# Patient Record
Sex: MALE | Race: WHITE | Employment: OTHER | URBAN - METROPOLITAN AREA
[De-identification: names, ages, dates, MRNs, and addresses within clinical notes are randomized per-mention and may not be internally consistent; named-entity substitution may affect disease eponyms.]

---

## 2017-02-14 RX ORDER — ATORVASTATIN CALCIUM 20 MG/1
TABLET, FILM COATED ORAL
Qty: 30 TABLET | Refills: 5 | Status: SHIPPED | OUTPATIENT
Start: 2017-02-14 | End: 2017-08-05 | Stop reason: SDUPTHER

## 2017-03-06 ENCOUNTER — TELEPHONE (OUTPATIENT)
Dept: FAMILY MEDICINE CLINIC | Age: 77
End: 2017-03-06

## 2017-03-07 ENCOUNTER — OFFICE VISIT (OUTPATIENT)
Dept: INTERNAL MEDICINE CLINIC | Age: 77
End: 2017-03-07

## 2017-03-07 VITALS
BODY MASS INDEX: 33.5 KG/M2 | DIASTOLIC BLOOD PRESSURE: 70 MMHG | WEIGHT: 234 LBS | SYSTOLIC BLOOD PRESSURE: 130 MMHG | HEIGHT: 70 IN | HEART RATE: 68 BPM

## 2017-03-07 DIAGNOSIS — I10 ESSENTIAL HYPERTENSION: Primary | ICD-10-CM

## 2017-03-07 DIAGNOSIS — E03.4 HYPOTHYROIDISM DUE TO ACQUIRED ATROPHY OF THYROID: ICD-10-CM

## 2017-03-07 DIAGNOSIS — E78.2 MIXED HYPERLIPIDEMIA: ICD-10-CM

## 2017-03-07 DIAGNOSIS — E55.9 VITAMIN D DEFICIENCY: ICD-10-CM

## 2017-03-07 DIAGNOSIS — D12.6 ADENOMATOUS COLON POLYP: ICD-10-CM

## 2017-03-07 DIAGNOSIS — R00.2 PALPITATIONS: ICD-10-CM

## 2017-03-07 DIAGNOSIS — N40.0 PROSTATE HYPERTROPHY: ICD-10-CM

## 2017-03-07 DIAGNOSIS — R73.02 GLUCOSE INTOLERANCE (IMPAIRED GLUCOSE TOLERANCE): ICD-10-CM

## 2017-03-07 LAB
BASOPHILS ABSOLUTE: 0.1 K/UL (ref 0–0.2)
BASOPHILS RELATIVE PERCENT: 0.7 %
BILIRUBIN URINE: NEGATIVE
BLOOD, URINE: NEGATIVE
CLARITY: CLEAR
COLOR: YELLOW
EOSINOPHILS ABSOLUTE: 0.3 K/UL (ref 0–0.6)
EOSINOPHILS RELATIVE PERCENT: 3.5 %
EPITHELIAL CELLS, UA: 0 /HPF (ref 0–5)
GLUCOSE URINE: NEGATIVE MG/DL
HCT VFR BLD CALC: 44.1 % (ref 40.5–52.5)
HEMOCCULT STL QL: NORMAL
HEMOGLOBIN: 14.2 G/DL (ref 13.5–17.5)
HYALINE CASTS: 0 /HPF (ref 0–8)
KETONES, URINE: NEGATIVE MG/DL
LEUKOCYTE ESTERASE, URINE: NEGATIVE
LYMPHOCYTES ABSOLUTE: 2.3 K/UL (ref 1–5.1)
LYMPHOCYTES RELATIVE PERCENT: 23.2 %
MCH RBC QN AUTO: 30.8 PG (ref 26–34)
MCHC RBC AUTO-ENTMCNC: 32.1 G/DL (ref 31–36)
MCV RBC AUTO: 95.8 FL (ref 80–100)
MICROSCOPIC EXAMINATION: ABNORMAL
MONOCYTES ABSOLUTE: 0.8 K/UL (ref 0–1.3)
MONOCYTES RELATIVE PERCENT: 7.9 %
NEUTROPHILS ABSOLUTE: 6.5 K/UL (ref 1.7–7.7)
NEUTROPHILS RELATIVE PERCENT: 64.7 %
NITRITE, URINE: NEGATIVE
PDW BLD-RTO: 14 % (ref 12.4–15.4)
PH UA: 5.5
PLATELET # BLD: 285 K/UL (ref 135–450)
PMV BLD AUTO: 9.2 FL (ref 5–10.5)
PROTEIN UA: NEGATIVE MG/DL
RBC # BLD: 4.61 M/UL (ref 4.2–5.9)
RBC UA: 5 /HPF (ref 0–4)
SPECIFIC GRAVITY UA: 1.02
UROBILINOGEN, URINE: 0.2 E.U./DL
WBC # BLD: 10.1 K/UL (ref 4–11)
WBC UA: 2 /HPF (ref 0–5)

## 2017-03-07 PROCEDURE — 82272 OCCULT BLD FECES 1-3 TESTS: CPT | Performed by: INTERNAL MEDICINE

## 2017-03-07 PROCEDURE — 93000 ELECTROCARDIOGRAM COMPLETE: CPT | Performed by: INTERNAL MEDICINE

## 2017-03-07 PROCEDURE — 36415 COLL VENOUS BLD VENIPUNCTURE: CPT | Performed by: INTERNAL MEDICINE

## 2017-03-07 PROCEDURE — 81001 URINALYSIS AUTO W/SCOPE: CPT | Performed by: INTERNAL MEDICINE

## 2017-03-07 PROCEDURE — 99215 OFFICE O/P EST HI 40 MIN: CPT | Performed by: INTERNAL MEDICINE

## 2017-03-08 LAB
A/G RATIO: 1.5 (ref 1.1–2.2)
ALBUMIN SERPL-MCNC: 4.4 G/DL (ref 3.4–5)
ALP BLD-CCNC: 85 U/L (ref 40–129)
ALT SERPL-CCNC: 15 U/L (ref 10–40)
ANION GAP SERPL CALCULATED.3IONS-SCNC: 15 MMOL/L (ref 3–16)
AST SERPL-CCNC: 20 U/L (ref 15–37)
BILIRUB SERPL-MCNC: 0.5 MG/DL (ref 0–1)
BUN BLDV-MCNC: 20 MG/DL (ref 7–20)
CALCIUM SERPL-MCNC: 10 MG/DL (ref 8.3–10.6)
CHLORIDE BLD-SCNC: 101 MMOL/L (ref 99–110)
CHOLESTEROL, TOTAL: 168 MG/DL (ref 0–199)
CO2: 26 MMOL/L (ref 21–32)
CREAT SERPL-MCNC: 0.9 MG/DL (ref 0.8–1.3)
ESTIMATED AVERAGE GLUCOSE: 148.5 MG/DL
GFR AFRICAN AMERICAN: >60
GFR NON-AFRICAN AMERICAN: >60
GLOBULIN: 2.9 G/DL
GLUCOSE BLD-MCNC: 99 MG/DL (ref 70–99)
HBA1C MFR BLD: 6.8 %
HDLC SERPL-MCNC: 57 MG/DL (ref 40–60)
LDL CHOLESTEROL CALCULATED: 73 MG/DL
POTASSIUM SERPL-SCNC: 5.3 MMOL/L (ref 3.5–5.1)
PROSTATE SPECIFIC ANTIGEN: 0.07 NG/ML (ref 0–4)
SODIUM BLD-SCNC: 142 MMOL/L (ref 136–145)
TOTAL CK: 85 U/L (ref 39–308)
TOTAL PROTEIN: 7.3 G/DL (ref 6.4–8.2)
TRIGL SERPL-MCNC: 192 MG/DL (ref 0–150)
TSH SERPL DL<=0.05 MIU/L-ACNC: 4.17 UIU/ML (ref 0.27–4.2)
VITAMIN D 25-HYDROXY: 38.5 NG/ML
VLDLC SERPL CALC-MCNC: 38 MG/DL

## 2017-03-20 ENCOUNTER — TELEPHONE (OUTPATIENT)
Dept: INTERNAL MEDICINE CLINIC | Age: 77
End: 2017-03-20

## 2017-03-21 ENCOUNTER — OFFICE VISIT (OUTPATIENT)
Dept: INTERNAL MEDICINE CLINIC | Age: 77
End: 2017-03-21

## 2017-03-21 VITALS — SYSTOLIC BLOOD PRESSURE: 120 MMHG | DIASTOLIC BLOOD PRESSURE: 70 MMHG | HEART RATE: 60 BPM | RESPIRATION RATE: 12 BRPM

## 2017-03-21 DIAGNOSIS — R73.9 HYPERGLYCEMIA: ICD-10-CM

## 2017-03-21 DIAGNOSIS — F17.200 SMOKING: ICD-10-CM

## 2017-03-21 DIAGNOSIS — I49.3 FREQUENT PVCS: Primary | ICD-10-CM

## 2017-03-21 DIAGNOSIS — E87.5 HYPERKALEMIA: ICD-10-CM

## 2017-03-21 DIAGNOSIS — J44.9 CHRONIC OBSTRUCTIVE PULMONARY DISEASE, UNSPECIFIED COPD TYPE (HCC): ICD-10-CM

## 2017-03-21 PROCEDURE — 36415 COLL VENOUS BLD VENIPUNCTURE: CPT | Performed by: INTERNAL MEDICINE

## 2017-03-21 PROCEDURE — 99213 OFFICE O/P EST LOW 20 MIN: CPT | Performed by: INTERNAL MEDICINE

## 2017-03-22 LAB
ANION GAP SERPL CALCULATED.3IONS-SCNC: 17 MMOL/L (ref 3–16)
BUN BLDV-MCNC: 23 MG/DL (ref 7–20)
CALCIUM SERPL-MCNC: 10.1 MG/DL (ref 8.3–10.6)
CHLORIDE BLD-SCNC: 101 MMOL/L (ref 99–110)
CO2: 26 MMOL/L (ref 21–32)
CREAT SERPL-MCNC: 1.1 MG/DL (ref 0.8–1.3)
ESTIMATED AVERAGE GLUCOSE: 116.9 MG/DL
GFR AFRICAN AMERICAN: >60
GFR NON-AFRICAN AMERICAN: >60
GLUCOSE BLD-MCNC: 107 MG/DL (ref 70–99)
HBA1C MFR BLD: 5.7 %
POTASSIUM SERPL-SCNC: 5.8 MMOL/L (ref 3.5–5.1)
SODIUM BLD-SCNC: 144 MMOL/L (ref 136–145)

## 2017-03-23 ENCOUNTER — TELEPHONE (OUTPATIENT)
Dept: INTERNAL MEDICINE CLINIC | Age: 77
End: 2017-03-23

## 2017-03-23 DIAGNOSIS — E87.5 HYPERKALEMIA: Primary | ICD-10-CM

## 2017-03-28 LAB
BUN / CREAT RATIO: 19 (CALC) (ref 7–25)
BUN BLDV-MCNC: 21 MG/DL (ref 3–29)
CALCIUM SERPL-MCNC: 10.3 MG/DL (ref 8.5–10.5)
CHLORIDE BLD-SCNC: 99 MEQ/L (ref 96–110)
CO2: 30 MEQ/L (ref 19–32)
CREAT SERPL-MCNC: 1.1 MG/DL
GFR SERPL CREATININE-BSD FRML MDRD: 65 ML/MIN/1.73M2
GLUCOSE BLD-MCNC: 99 MG/DL
POTASSIUM SERPL-SCNC: 5.2 MEQ/L (ref 3.4–5.3)
SODIUM BLD-SCNC: 141 MEQ/L (ref 135–148)

## 2017-05-12 RX ORDER — METFORMIN HYDROCHLORIDE 500 MG/1
TABLET, EXTENDED RELEASE ORAL
Qty: 30 TABLET | Refills: 5 | Status: SHIPPED | OUTPATIENT
Start: 2017-05-12 | End: 2018-11-26 | Stop reason: SDUPTHER

## 2017-05-15 RX ORDER — METFORMIN HYDROCHLORIDE 500 MG/1
TABLET, EXTENDED RELEASE ORAL
Qty: 30 TABLET | Refills: 5 | Status: SHIPPED | OUTPATIENT
Start: 2017-05-15 | End: 2017-10-09 | Stop reason: SDUPTHER

## 2017-06-28 ENCOUNTER — TELEPHONE (OUTPATIENT)
Dept: INTERNAL MEDICINE CLINIC | Age: 77
End: 2017-06-28

## 2017-06-28 DIAGNOSIS — Z79.899 ENCOUNTER FOR LONG-TERM (CURRENT) USE OF MEDICATIONS: ICD-10-CM

## 2017-06-28 DIAGNOSIS — E11.9 TYPE 2 DIABETES MELLITUS WITHOUT COMPLICATION, WITHOUT LONG-TERM CURRENT USE OF INSULIN (HCC): Primary | ICD-10-CM

## 2017-06-28 DIAGNOSIS — Z51.81 ENCOUNTER FOR THERAPEUTIC DRUG MONITORING: ICD-10-CM

## 2017-06-28 DIAGNOSIS — E78.2 MIXED HYPERLIPIDEMIA: ICD-10-CM

## 2017-06-28 DIAGNOSIS — I10 ESSENTIAL HYPERTENSION: ICD-10-CM

## 2017-06-28 DIAGNOSIS — F41.9 ANXIETY: ICD-10-CM

## 2017-06-28 DIAGNOSIS — J44.9 CHRONIC OBSTRUCTIVE PULMONARY DISEASE, UNSPECIFIED COPD TYPE (HCC): ICD-10-CM

## 2017-07-07 ENCOUNTER — NURSE ONLY (OUTPATIENT)
Dept: INTERNAL MEDICINE CLINIC | Age: 77
End: 2017-07-07

## 2017-07-07 DIAGNOSIS — E11.9 TYPE 2 DIABETES MELLITUS WITHOUT COMPLICATION, WITHOUT LONG-TERM CURRENT USE OF INSULIN (HCC): ICD-10-CM

## 2017-07-07 DIAGNOSIS — E78.2 MIXED HYPERLIPIDEMIA: ICD-10-CM

## 2017-07-07 DIAGNOSIS — F41.9 ANXIETY: ICD-10-CM

## 2017-07-07 LAB
ALBUMIN SERPL-MCNC: 4.5 G/DL (ref 3.4–5)
ALP BLD-CCNC: 84 U/L (ref 40–129)
ALT SERPL-CCNC: 16 U/L (ref 10–40)
ANION GAP SERPL CALCULATED.3IONS-SCNC: 16 MMOL/L (ref 3–16)
AST SERPL-CCNC: 22 U/L (ref 15–37)
BASOPHILS ABSOLUTE: 0.1 K/UL (ref 0–0.2)
BASOPHILS RELATIVE PERCENT: 0.7 %
BILIRUB SERPL-MCNC: 0.5 MG/DL (ref 0–1)
BILIRUBIN DIRECT: <0.2 MG/DL (ref 0–0.3)
BILIRUBIN, INDIRECT: NORMAL MG/DL (ref 0–1)
BUN BLDV-MCNC: 19 MG/DL (ref 7–20)
CALCIUM SERPL-MCNC: 10 MG/DL (ref 8.3–10.6)
CHLORIDE BLD-SCNC: 100 MMOL/L (ref 99–110)
CHOLESTEROL, TOTAL: 163 MG/DL (ref 0–199)
CO2: 27 MMOL/L (ref 21–32)
CREAT SERPL-MCNC: 0.9 MG/DL (ref 0.8–1.3)
EOSINOPHILS ABSOLUTE: 0.5 K/UL (ref 0–0.6)
EOSINOPHILS RELATIVE PERCENT: 5.4 %
GFR AFRICAN AMERICAN: >60
GFR NON-AFRICAN AMERICAN: >60
GLUCOSE BLD-MCNC: 89 MG/DL (ref 70–99)
HCT VFR BLD CALC: 43.5 % (ref 40.5–52.5)
HDLC SERPL-MCNC: 57 MG/DL (ref 40–60)
HEMOGLOBIN: 14.6 G/DL (ref 13.5–17.5)
LDL CHOLESTEROL CALCULATED: 67 MG/DL
LYMPHOCYTES ABSOLUTE: 2.3 K/UL (ref 1–5.1)
LYMPHOCYTES RELATIVE PERCENT: 26.4 %
MCH RBC QN AUTO: 32.4 PG (ref 26–34)
MCHC RBC AUTO-ENTMCNC: 33.6 G/DL (ref 31–36)
MCV RBC AUTO: 96.4 FL (ref 80–100)
MONOCYTES ABSOLUTE: 0.7 K/UL (ref 0–1.3)
MONOCYTES RELATIVE PERCENT: 7.6 %
NEUTROPHILS ABSOLUTE: 5.2 K/UL (ref 1.7–7.7)
NEUTROPHILS RELATIVE PERCENT: 59.9 %
PDW BLD-RTO: 14.2 % (ref 12.4–15.4)
PLATELET # BLD: 312 K/UL (ref 135–450)
PMV BLD AUTO: 8.9 FL (ref 5–10.5)
POTASSIUM SERPL-SCNC: 5.1 MMOL/L (ref 3.5–5.1)
RBC # BLD: 4.51 M/UL (ref 4.2–5.9)
SODIUM BLD-SCNC: 143 MMOL/L (ref 136–145)
TOTAL CK: 110 U/L (ref 39–308)
TOTAL PROTEIN: 7.6 G/DL (ref 6.4–8.2)
TRIGL SERPL-MCNC: 195 MG/DL (ref 0–150)
VLDLC SERPL CALC-MCNC: 39 MG/DL
WBC # BLD: 8.7 K/UL (ref 4–11)

## 2017-07-07 PROCEDURE — 36415 COLL VENOUS BLD VENIPUNCTURE: CPT | Performed by: INTERNAL MEDICINE

## 2017-07-08 LAB
ESTIMATED AVERAGE GLUCOSE: 128.4 MG/DL
HBA1C MFR BLD: 6.1 %

## 2017-07-21 ENCOUNTER — OFFICE VISIT (OUTPATIENT)
Dept: INTERNAL MEDICINE CLINIC | Age: 77
End: 2017-07-21

## 2017-07-21 VITALS
RESPIRATION RATE: 12 BRPM | DIASTOLIC BLOOD PRESSURE: 70 MMHG | SYSTOLIC BLOOD PRESSURE: 126 MMHG | HEART RATE: 64 BPM | WEIGHT: 232 LBS | BODY MASS INDEX: 33.29 KG/M2

## 2017-07-21 DIAGNOSIS — I10 ESSENTIAL HYPERTENSION: ICD-10-CM

## 2017-07-21 DIAGNOSIS — E11.9 TYPE 2 DIABETES MELLITUS WITHOUT COMPLICATION, WITHOUT LONG-TERM CURRENT USE OF INSULIN (HCC): Primary | ICD-10-CM

## 2017-07-21 DIAGNOSIS — J01.00 ACUTE NON-RECURRENT MAXILLARY SINUSITIS: ICD-10-CM

## 2017-07-21 DIAGNOSIS — E78.2 MIXED HYPERLIPIDEMIA: ICD-10-CM

## 2017-07-21 PROCEDURE — 99213 OFFICE O/P EST LOW 20 MIN: CPT | Performed by: INTERNAL MEDICINE

## 2017-07-21 RX ORDER — AMOXICILLIN 500 MG/1
500 CAPSULE ORAL 3 TIMES DAILY
Qty: 30 CAPSULE | Refills: 0 | Status: SHIPPED | OUTPATIENT
Start: 2017-07-21 | End: 2017-07-31

## 2017-07-21 RX ORDER — LORAZEPAM 0.5 MG/1
TABLET ORAL
Qty: 30 TABLET | Refills: 2 | Status: SHIPPED | OUTPATIENT
Start: 2017-07-21 | End: 2018-03-12 | Stop reason: SDUPTHER

## 2017-07-21 ASSESSMENT — PATIENT HEALTH QUESTIONNAIRE - PHQ9
SUM OF ALL RESPONSES TO PHQ9 QUESTIONS 1 & 2: 0
1. LITTLE INTEREST OR PLEASURE IN DOING THINGS: 0
2. FEELING DOWN, DEPRESSED OR HOPELESS: 0
SUM OF ALL RESPONSES TO PHQ QUESTIONS 1-9: 0

## 2017-08-07 RX ORDER — ATORVASTATIN CALCIUM 20 MG/1
TABLET, FILM COATED ORAL
Qty: 30 TABLET | Refills: 5 | Status: SHIPPED | OUTPATIENT
Start: 2017-08-07 | End: 2018-01-30 | Stop reason: SDUPTHER

## 2017-10-09 RX ORDER — METFORMIN HYDROCHLORIDE 500 MG/1
TABLET, EXTENDED RELEASE ORAL
Qty: 30 TABLET | Refills: 5 | Status: CANCELLED | OUTPATIENT
Start: 2017-10-09

## 2017-10-09 RX ORDER — METFORMIN HYDROCHLORIDE 500 MG/1
TABLET, EXTENDED RELEASE ORAL
Qty: 90 TABLET | Refills: 3 | Status: SHIPPED | OUTPATIENT
Start: 2017-10-09 | End: 2017-10-16

## 2017-10-16 ENCOUNTER — TELEPHONE (OUTPATIENT)
Dept: INTERNAL MEDICINE CLINIC | Age: 77
End: 2017-10-16

## 2017-10-16 RX ORDER — METHYLPREDNISOLONE 4 MG/1
TABLET ORAL
Qty: 1 KIT | Refills: 0 | Status: SHIPPED | OUTPATIENT
Start: 2017-10-16 | End: 2017-10-16

## 2017-10-16 RX ORDER — AZITHROMYCIN 250 MG/1
TABLET, FILM COATED ORAL
Qty: 1 PACKET | Refills: 0 | Status: ON HOLD | OUTPATIENT
Start: 2017-10-16 | End: 2017-10-17 | Stop reason: HOSPADM

## 2017-10-17 PROBLEM — M31.6 GIANT CELL ARTERITIS (HCC): Status: ACTIVE | Noted: 2017-10-17

## 2017-10-24 ENCOUNTER — TELEPHONE (OUTPATIENT)
Dept: INTERNAL MEDICINE CLINIC | Age: 77
End: 2017-10-24

## 2017-10-24 RX ORDER — FAMCICLOVIR 500 MG/1
500 TABLET, FILM COATED ORAL 3 TIMES DAILY
Qty: 21 TABLET | Refills: 0 | Status: SHIPPED | OUTPATIENT
Start: 2017-10-24 | End: 2017-10-31

## 2017-10-24 RX ORDER — GABAPENTIN 100 MG/1
100 CAPSULE ORAL 3 TIMES DAILY PRN
Qty: 30 CAPSULE | Refills: 1 | Status: SHIPPED | OUTPATIENT
Start: 2017-10-24 | End: 2018-03-21

## 2017-10-24 NOTE — TELEPHONE ENCOUNTER
Two weeks ago the patient developed right forehead & right eye pain with no fever or chills. He was seen by Dr Nato Muro who did a right temporal artery biopsy that was negative for arteritis. He then developed a vesicular erythematous rash along with right eye swelling. No eye pain presently. Dr Nato Muro informed the patient he has shingles and contacted me. I informed him I was on vacation, and  I would call the patient. Famvir 500 mg tid for 7 days, from 10/17/17 his BUN 16 Creat 0.9, and Gabapentin 100 mg tid prn for nerve pain were sent to [facility] on EM. On Wed morning Kiera will arrange for a  physician OV.

## 2017-10-25 ENCOUNTER — OFFICE VISIT (OUTPATIENT)
Dept: INTERNAL MEDICINE CLINIC | Age: 77
End: 2017-10-25

## 2017-10-25 VITALS
DIASTOLIC BLOOD PRESSURE: 60 MMHG | SYSTOLIC BLOOD PRESSURE: 128 MMHG | RESPIRATION RATE: 16 BRPM | HEART RATE: 56 BPM | WEIGHT: 218.6 LBS | BODY MASS INDEX: 30.49 KG/M2

## 2017-10-25 DIAGNOSIS — B02.31 HERPES ZOSTER CONJUNCTIVITIS: Primary | ICD-10-CM

## 2017-10-25 PROBLEM — B02.9 SHINGLES: Status: ACTIVE | Noted: 2017-10-25

## 2017-10-25 PROCEDURE — G8417 CALC BMI ABV UP PARAM F/U: HCPCS | Performed by: INTERNAL MEDICINE

## 2017-10-25 PROCEDURE — 4004F PT TOBACCO SCREEN RCVD TLK: CPT | Performed by: INTERNAL MEDICINE

## 2017-10-25 PROCEDURE — 1111F DSCHRG MED/CURRENT MED MERGE: CPT | Performed by: INTERNAL MEDICINE

## 2017-10-25 PROCEDURE — 1123F ACP DISCUSS/DSCN MKR DOCD: CPT | Performed by: INTERNAL MEDICINE

## 2017-10-25 PROCEDURE — 99214 OFFICE O/P EST MOD 30 MIN: CPT | Performed by: INTERNAL MEDICINE

## 2017-10-25 PROCEDURE — G8484 FLU IMMUNIZE NO ADMIN: HCPCS | Performed by: INTERNAL MEDICINE

## 2017-10-25 PROCEDURE — 4040F PNEUMOC VAC/ADMIN/RCVD: CPT | Performed by: INTERNAL MEDICINE

## 2017-10-25 PROCEDURE — G8427 DOCREV CUR MEDS BY ELIG CLIN: HCPCS | Performed by: INTERNAL MEDICINE

## 2017-10-25 RX ORDER — HYDROCODONE BITARTRATE AND ACETAMINOPHEN 5; 325 MG/1; MG/1
TABLET ORAL
Refills: 0 | COMMUNITY
Start: 2017-10-20 | End: 2018-03-21

## 2017-10-25 NOTE — PROGRESS NOTES
Subjective:      Norma Sweet is a 68 y.o. male who presents today for follow up on his chronic medical conditions as noted below. Patient Active Problem List:     Type 2 diabetes mellitus ([facility] [address]     Hyperlipidemia     Diverticulosis     Hx of adenomatous colonic polyps     Family history of prostate cancer     Anxiety     Dyspnea     History of 24 hour EKG monitoring     Giant cell arteritis ([facility] [address]     Shingles     He had right temporal artery bx by [facility] a week ago    He saw [facility] yesterday and was told he had shingles right temple  Referred here. Dr Sy Howell is out and I am covering    He went to ER 10/16 with right temp pain and swelling and HA  Was admitted and treated for possible TA and given high dose steroids and referred to [facility] for bx    Vesicular rash developed on Friday , Oct 20    Dr Sy Howell called in famvir 500 tid and neurontin 100 tid last night  He has had famvir last night  Has yet to take today's dose; emphasized to take TID , beginning each AM    Has no right temple pain; has drying vesicular rash on mildly erythematous base right tenple, extending to brow and scalp line on right; right eye swollen shut; easily opened.     Has right periorbital edema and has conjunctival injection right eye  The eye is not painful ; vision sl blurred    No cellulitis    Current Outpatient Prescriptions   Medication Sig Dispense Refill    HYDROcodone-acetaminophen (NORCO) 5-325 MG per tablet TAKE 1 TO 2 TABLETS BY MOUTH EVERY 6 HOURS AS NEEDED FOR PAIN  0    famciclovir (FAMVIR) 500 MG tablet Take 1 tablet by mouth 3 times daily for 7 days 21 tablet 0    gabapentin (NEURONTIN) 100 MG capsule Take 1 capsule by mouth 3 times daily as needed (nerve pain) 30 capsule 1    traMADol (ULTRAM) 50 MG tablet Take 1 tablet by mouth every 6 hours as needed for Pain 15 tablet 0    pantoprazole (PROTONIX) 40 MG tablet Take 1 tablet by mouth daily 30 tablet 0    atorvastatin (LIPITOR) 20 MG tablet TAKE 1 TABLET BY MOUTH EVERY DAY 30 tablet 5    metFORMIN (GLUCOPHAGE-XR) 500 MG extended release tablet TAKE 1 TABLET BY MOUTH ONCE DAILY IN THE EVENING WITH DINNER 30 tablet 5    aspirin 81 MG tablet Take 81 mg by mouth daily.  Cholecalciferol (VITAMIN D3) 2000 UNIT CAPS Take  by mouth daily.  Coenzyme Q-10 50 MG TABS Take  by mouth daily.  calcium-vitamin D (OSCAL-500) 500-200 MG-UNIT per tablet Take 1 tablet by mouth 2 times daily. No current facility-administered medications for this visit. Past Medical History:   Diagnosis Date    Anxiety     Bradycardia     Cancer ([facility] [address] 07/2001    basal cell CA right preauricle    Colonic polyps     adenoma    [facility]     Diverticulosis     scattered    Dyspnea 8/2014    Hyperlipidemia     Internal hemorrhoids 1/2012    Tubular adenoma of colon 1/2012    sigmoid        Social History   Substance Use Topics    Smoking status: Current Every Day Smoker     Packs/day: 0.50     Types: Cigarettes    Smokeless tobacco: Never Used    Alcohol use No      Comment: stopped alcohol in 1991, drinks 3 to 4 cups of coffee per day        ROS: The patient has had no headache, sore throat, fever or chills, cough, dyspnea, chest pain, nausea, vomiting or diarrhea, or edema. Objective:      /60   Pulse 56   Resp 16   Wt 218 lb 9.6 oz (99.2 kg)   BMI 30.49 kg/m²    General: in no apparent distress   The patient's neck is free of nodes. Lungs are clear. Heart is normal in rate and regular in rhythm. Legs are free of edema. No rash or erythema. ER records, hosp records, discharge summary rev'd  [facility] record rev'd  Temp artery path report rev'd from 10/20: negative for TA       Assessment / Plan:      1.  Herpes zoster conjunctivitis             Patient does not have temporal arteritis       Plan   Referred to Dr Rashel Silva who will see him at 1 PM today  Take one more dose prednisone and stop it  Take famvir and neurontin as prescribed  F/u Dr Maddie Gomez in 7d as scheduled  Orders Placed This Encounter   Procedures    External Referral To Ophthalmology

## 2017-11-01 ENCOUNTER — OFFICE VISIT (OUTPATIENT)
Dept: INTERNAL MEDICINE CLINIC | Age: 77
End: 2017-11-01

## 2017-11-01 VITALS
BODY MASS INDEX: 30.68 KG/M2 | SYSTOLIC BLOOD PRESSURE: 130 MMHG | WEIGHT: 220 LBS | DIASTOLIC BLOOD PRESSURE: 80 MMHG | HEART RATE: 80 BPM

## 2017-11-01 DIAGNOSIS — B02.30 HERPES ZOSTER WITH OPHTHALMIC COMPLICATION, UNSPECIFIED HERPES ZOSTER EYE DISEASE: Primary | ICD-10-CM

## 2017-11-01 DIAGNOSIS — E11.9 TYPE 2 DIABETES MELLITUS WITHOUT COMPLICATION, WITHOUT LONG-TERM CURRENT USE OF INSULIN (HCC): ICD-10-CM

## 2017-11-01 DIAGNOSIS — M79.2 NEURITIS: ICD-10-CM

## 2017-11-01 PROCEDURE — 4004F PT TOBACCO SCREEN RCVD TLK: CPT | Performed by: INTERNAL MEDICINE

## 2017-11-01 PROCEDURE — G8417 CALC BMI ABV UP PARAM F/U: HCPCS | Performed by: INTERNAL MEDICINE

## 2017-11-01 PROCEDURE — 4040F PNEUMOC VAC/ADMIN/RCVD: CPT | Performed by: INTERNAL MEDICINE

## 2017-11-01 PROCEDURE — G8428 CUR MEDS NOT DOCUMENT: HCPCS | Performed by: INTERNAL MEDICINE

## 2017-11-01 PROCEDURE — 1123F ACP DISCUSS/DSCN MKR DOCD: CPT | Performed by: INTERNAL MEDICINE

## 2017-11-01 PROCEDURE — G8484 FLU IMMUNIZE NO ADMIN: HCPCS | Performed by: INTERNAL MEDICINE

## 2017-11-01 PROCEDURE — 99213 OFFICE O/P EST LOW 20 MIN: CPT | Performed by: INTERNAL MEDICINE

## 2017-11-01 PROCEDURE — 1111F DSCHRG MED/CURRENT MED MERGE: CPT | Performed by: INTERNAL MEDICINE

## 2017-11-01 RX ORDER — PREDNISOLONE ACETATE 10 MG/ML
SUSPENSION/ DROPS OPHTHALMIC
Refills: 1 | COMMUNITY
Start: 2017-10-26 | End: 2018-03-21

## 2017-11-01 RX ORDER — LORAZEPAM 0.5 MG/1
TABLET ORAL
COMMUNITY
Start: 2017-10-30 | End: 2018-03-21 | Stop reason: SDUPTHER

## 2017-11-01 RX ORDER — ATROPINE SULFATE 10 MG/ML
SOLUTION/ DROPS OPHTHALMIC
Refills: 0 | COMMUNITY
Start: 2017-10-26 | End: 2021-07-21

## 2017-11-01 RX ORDER — TRAMADOL HYDROCHLORIDE 50 MG/1
TABLET ORAL
COMMUNITY
Start: 2017-10-30 | End: 2018-03-21

## 2017-11-01 RX ORDER — TIMOLOL MALEATE 5 MG/ML
SOLUTION/ DROPS OPHTHALMIC
Refills: 2 | COMMUNITY
Start: 2017-10-25 | End: 2018-03-21

## 2017-11-21 ENCOUNTER — OFFICE VISIT (OUTPATIENT)
Dept: INTERNAL MEDICINE CLINIC | Age: 77
End: 2017-11-21

## 2017-11-21 VITALS
BODY MASS INDEX: 29.21 KG/M2 | HEART RATE: 76 BPM | DIASTOLIC BLOOD PRESSURE: 70 MMHG | WEIGHT: 209.4 LBS | SYSTOLIC BLOOD PRESSURE: 110 MMHG

## 2017-11-21 DIAGNOSIS — I10 ESSENTIAL HYPERTENSION: ICD-10-CM

## 2017-11-21 DIAGNOSIS — B02.30 HERPES ZOSTER WITH OPHTHALMIC COMPLICATION, UNSPECIFIED HERPES ZOSTER EYE DISEASE: Primary | ICD-10-CM

## 2017-11-21 DIAGNOSIS — F41.9 ANXIETY: ICD-10-CM

## 2017-11-21 DIAGNOSIS — E11.9 TYPE 2 DIABETES MELLITUS WITHOUT COMPLICATION, WITHOUT LONG-TERM CURRENT USE OF INSULIN (HCC): ICD-10-CM

## 2017-11-21 PROCEDURE — 99213 OFFICE O/P EST LOW 20 MIN: CPT | Performed by: INTERNAL MEDICINE

## 2017-11-21 PROCEDURE — 1123F ACP DISCUSS/DSCN MKR DOCD: CPT | Performed by: INTERNAL MEDICINE

## 2017-11-21 PROCEDURE — G8427 DOCREV CUR MEDS BY ELIG CLIN: HCPCS | Performed by: INTERNAL MEDICINE

## 2017-11-21 PROCEDURE — G8417 CALC BMI ABV UP PARAM F/U: HCPCS | Performed by: INTERNAL MEDICINE

## 2017-11-21 PROCEDURE — 4004F PT TOBACCO SCREEN RCVD TLK: CPT | Performed by: INTERNAL MEDICINE

## 2017-11-21 PROCEDURE — G8484 FLU IMMUNIZE NO ADMIN: HCPCS | Performed by: INTERNAL MEDICINE

## 2017-11-21 PROCEDURE — 4040F PNEUMOC VAC/ADMIN/RCVD: CPT | Performed by: INTERNAL MEDICINE

## 2017-11-21 RX ORDER — HYDROXYZINE PAMOATE 50 MG/1
50 CAPSULE ORAL DAILY PRN
Qty: 30 CAPSULE | Refills: 1 | Status: SHIPPED | OUTPATIENT
Start: 2017-11-21 | End: 2018-01-21 | Stop reason: SDUPTHER

## 2017-12-06 NOTE — PROGRESS NOTES
S: Patient presents with problems of hypertension, diabetes mellitus, and hyperlipidemia. He is recovering from a left facial herpes zoster episode with neuralgia and ocular involvement. He has completed the Famvir therapy and continues to use his ophthalmic medications per ophthalmology. No eye pain. No fever or chills. No headache, chest pain, or breathing difficulties. He is having sleeping difficulties and anxiety symptoms. O:Blood pressure 110/70, pulse 76, weight 209 lb 6.4 oz (95 kg). HEENT: TMs & canals were clear. PERTL & EOMs intact. Left facial lesions resolving. Neck: No palpable lymph nodes, normal thyroid examination.       Lungs: clear      Cardio: reg pulse      Ext: no edema    A: diabetes mellitus       Hypertension       Hyperlipidemia      Left facial herpes zoster resolving      Anxiety disorder      Chronic insomnia     P: Vistaril 50 mg qhs prn itching & anxiety #30 RX1      Continue low carb and low fat diet       Encouraged to stop smoking      Return in 6 weeks, come fasting

## 2018-01-10 ENCOUNTER — OFFICE VISIT (OUTPATIENT)
Dept: INTERNAL MEDICINE CLINIC | Age: 78
End: 2018-01-10

## 2018-01-10 VITALS
WEIGHT: 206.4 LBS | DIASTOLIC BLOOD PRESSURE: 66 MMHG | HEART RATE: 76 BPM | SYSTOLIC BLOOD PRESSURE: 110 MMHG | BODY MASS INDEX: 28.79 KG/M2

## 2018-01-10 DIAGNOSIS — I10 ESSENTIAL HYPERTENSION: ICD-10-CM

## 2018-01-10 DIAGNOSIS — Z23 NEEDS FLU SHOT: ICD-10-CM

## 2018-01-10 DIAGNOSIS — B02.29 POST HERPETIC NEURALGIA: Primary | ICD-10-CM

## 2018-01-10 DIAGNOSIS — F41.9 ANXIETY: ICD-10-CM

## 2018-01-10 DIAGNOSIS — E11.9 TYPE 2 DIABETES MELLITUS WITHOUT COMPLICATION, WITHOUT LONG-TERM CURRENT USE OF INSULIN (HCC): ICD-10-CM

## 2018-01-10 PROCEDURE — 4004F PT TOBACCO SCREEN RCVD TLK: CPT | Performed by: INTERNAL MEDICINE

## 2018-01-10 PROCEDURE — G8427 DOCREV CUR MEDS BY ELIG CLIN: HCPCS | Performed by: INTERNAL MEDICINE

## 2018-01-10 PROCEDURE — 90662 IIV NO PRSV INCREASED AG IM: CPT | Performed by: INTERNAL MEDICINE

## 2018-01-10 PROCEDURE — 99213 OFFICE O/P EST LOW 20 MIN: CPT | Performed by: INTERNAL MEDICINE

## 2018-01-10 PROCEDURE — G8484 FLU IMMUNIZE NO ADMIN: HCPCS | Performed by: INTERNAL MEDICINE

## 2018-01-10 PROCEDURE — G8417 CALC BMI ABV UP PARAM F/U: HCPCS | Performed by: INTERNAL MEDICINE

## 2018-01-10 PROCEDURE — 1123F ACP DISCUSS/DSCN MKR DOCD: CPT | Performed by: INTERNAL MEDICINE

## 2018-01-10 PROCEDURE — G0008 ADMIN INFLUENZA VIRUS VAC: HCPCS | Performed by: INTERNAL MEDICINE

## 2018-01-10 PROCEDURE — 4040F PNEUMOC VAC/ADMIN/RCVD: CPT | Performed by: INTERNAL MEDICINE

## 2018-01-10 NOTE — PATIENT INSTRUCTIONS
Start Gabapentin 100 mg take a capsule a day at bedtime.      Breo 100/25 do one inhalation a day, rinse mouth with water after

## 2018-01-21 NOTE — PROGRESS NOTES
S: Patient presents for followup on hypertension, diabetes mellitus, and hyperlipidemia. 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 He continues with gabapentin tid for facial pain and seeing his ophthalmologist. No fever or chills. No headache, chest pain, or breathing difficulties. He is sleeping better with less anxiety. O:Blood pressure 110/66, pulse 76, weight 206 lb 6.4 oz (93.6 kg). HEENT: TMs & canals were clear, oral pharynx clear      Neck: No palpable lymph nodes, normal thyroid examination.       Lungs: clear      Cardio: reg pulse      Ext: no edema        Labs: from 10/17/17 Lytes & Renal tests normal, Glucose 149, Hgba1c 5.8%, CBC with wbc 8.9 hgb 14.9 platelet 086H    A: diabetes mellitus on oral agents      Hypertension controlled      Hyperlipidemia on Lipitor      Post herpetic neuralgia     P: Continue Gabapentin 100 mg tid prn       Continue Vistaril 50 mg qhs prn anxiety/sleep      Continue diabetic and low fat diet      Encouraged to stop smoking      High Dose flu immunization given      Return in March as scheduled for H&P

## 2018-01-30 RX ORDER — ATORVASTATIN CALCIUM 20 MG/1
TABLET, FILM COATED ORAL
Qty: 30 TABLET | Refills: 5 | Status: SHIPPED | OUTPATIENT
Start: 2018-01-30 | End: 2018-07-16 | Stop reason: SDUPTHER

## 2018-03-12 RX ORDER — LORAZEPAM 0.5 MG/1
TABLET ORAL
Qty: 30 TABLET | Refills: 2 | Status: SHIPPED | OUTPATIENT
Start: 2018-03-12 | End: 2018-09-17 | Stop reason: SDUPTHER

## 2018-03-21 ENCOUNTER — OFFICE VISIT (OUTPATIENT)
Dept: INTERNAL MEDICINE CLINIC | Age: 78
End: 2018-03-21

## 2018-03-21 VITALS
HEIGHT: 70 IN | WEIGHT: 204 LBS | BODY MASS INDEX: 29.2 KG/M2 | DIASTOLIC BLOOD PRESSURE: 70 MMHG | HEART RATE: 68 BPM | SYSTOLIC BLOOD PRESSURE: 110 MMHG

## 2018-03-21 DIAGNOSIS — E55.9 VITAMIN D DEFICIENCY: ICD-10-CM

## 2018-03-21 DIAGNOSIS — E03.4 HYPOTHYROIDISM DUE TO ACQUIRED ATROPHY OF THYROID: ICD-10-CM

## 2018-03-21 DIAGNOSIS — R00.2 PALPITATIONS: ICD-10-CM

## 2018-03-21 DIAGNOSIS — M79.2 NERVE PAIN: ICD-10-CM

## 2018-03-21 DIAGNOSIS — F41.9 ANXIETY: ICD-10-CM

## 2018-03-21 DIAGNOSIS — E11.9 TYPE 2 DIABETES MELLITUS WITHOUT COMPLICATION, WITHOUT LONG-TERM CURRENT USE OF INSULIN (HCC): Primary | ICD-10-CM

## 2018-03-21 DIAGNOSIS — Z86.010 HX OF ADENOMATOUS COLONIC POLYPS: ICD-10-CM

## 2018-03-21 DIAGNOSIS — E78.2 MIXED HYPERLIPIDEMIA: ICD-10-CM

## 2018-03-21 DIAGNOSIS — D50.9 IRON DEFICIENCY ANEMIA, UNSPECIFIED IRON DEFICIENCY ANEMIA TYPE: ICD-10-CM

## 2018-03-21 LAB
A/G RATIO: 1.8 (ref 1.1–2.2)
ALBUMIN SERPL-MCNC: 4.5 G/DL (ref 3.4–5)
ALP BLD-CCNC: 95 U/L (ref 40–129)
ALT SERPL-CCNC: 17 U/L (ref 10–40)
ANION GAP SERPL CALCULATED.3IONS-SCNC: 13 MMOL/L (ref 3–16)
AST SERPL-CCNC: 25 U/L (ref 15–37)
BASOPHILS ABSOLUTE: 0.1 K/UL (ref 0–0.2)
BASOPHILS RELATIVE PERCENT: 0.5 %
BILIRUB SERPL-MCNC: 0.5 MG/DL (ref 0–1)
BILIRUBIN URINE: ABNORMAL
BLOOD, URINE: NEGATIVE
BUN BLDV-MCNC: 25 MG/DL (ref 7–20)
CALCIUM SERPL-MCNC: 9.9 MG/DL (ref 8.3–10.6)
CHLORIDE BLD-SCNC: 101 MMOL/L (ref 99–110)
CHOLESTEROL, TOTAL: 165 MG/DL (ref 0–199)
CLARITY: ABNORMAL
CO2: 27 MMOL/L (ref 21–32)
COLOR: ABNORMAL
CREAT SERPL-MCNC: 0.9 MG/DL (ref 0.8–1.3)
CREATININE URINE: 274.4 MG/DL (ref 39–259)
EOSINOPHILS ABSOLUTE: 0.3 K/UL (ref 0–0.6)
EOSINOPHILS RELATIVE PERCENT: 3 %
EPITHELIAL CELLS, UA: 1 /HPF (ref 0–5)
GFR AFRICAN AMERICAN: >60
GFR NON-AFRICAN AMERICAN: >60
GLOBULIN: 2.5 G/DL
GLUCOSE BLD-MCNC: 109 MG/DL (ref 70–99)
GLUCOSE URINE: NEGATIVE MG/DL
HCT VFR BLD CALC: 45.8 % (ref 40.5–52.5)
HDLC SERPL-MCNC: 57 MG/DL (ref 40–60)
HEMOGLOBIN: 15.2 G/DL (ref 13.5–17.5)
HYALINE CASTS: 3 /LPF (ref 0–8)
KETONES, URINE: NEGATIVE MG/DL
LDL CHOLESTEROL CALCULATED: 73 MG/DL
LEUKOCYTE ESTERASE, URINE: ABNORMAL
LYMPHOCYTES ABSOLUTE: 2.2 K/UL (ref 1–5.1)
LYMPHOCYTES RELATIVE PERCENT: 23 %
MCH RBC QN AUTO: 32.2 PG (ref 26–34)
MCHC RBC AUTO-ENTMCNC: 33.3 G/DL (ref 31–36)
MCV RBC AUTO: 96.8 FL (ref 80–100)
MICROALBUMIN UR-MCNC: 1.4 MG/DL
MICROALBUMIN/CREAT UR-RTO: 5.1 MG/G (ref 0–30)
MICROSCOPIC EXAMINATION: YES
MONOCYTES ABSOLUTE: 0.7 K/UL (ref 0–1.3)
MONOCYTES RELATIVE PERCENT: 6.9 %
NEUTROPHILS ABSOLUTE: 6.3 K/UL (ref 1.7–7.7)
NEUTROPHILS RELATIVE PERCENT: 66.6 %
NITRITE, URINE: NEGATIVE
PDW BLD-RTO: 13.2 % (ref 12.4–15.4)
PH UA: 5.5
PLATELET # BLD: 319 K/UL (ref 135–450)
PMV BLD AUTO: 9.4 FL (ref 5–10.5)
POTASSIUM SERPL-SCNC: 5.5 MMOL/L (ref 3.5–5.1)
PROTEIN UA: NEGATIVE MG/DL
RBC # BLD: 4.73 M/UL (ref 4.2–5.9)
RBC UA: 5 /HPF (ref 0–4)
SODIUM BLD-SCNC: 141 MMOL/L (ref 136–145)
SPECIFIC GRAVITY UA: 1.03
TOTAL CK: 57 U/L (ref 39–308)
TOTAL PROTEIN: 7 G/DL (ref 6.4–8.2)
TRIGL SERPL-MCNC: 176 MG/DL (ref 0–150)
TSH SERPL DL<=0.05 MIU/L-ACNC: 4.45 UIU/ML (ref 0.27–4.2)
UROBILINOGEN, URINE: 0.2 E.U./DL
VITAMIN D 25-HYDROXY: 43.7 NG/ML
VLDLC SERPL CALC-MCNC: 35 MG/DL
WBC # BLD: 9.5 K/UL (ref 4–11)
WBC UA: 5 /HPF (ref 0–5)

## 2018-03-21 PROCEDURE — 4004F PT TOBACCO SCREEN RCVD TLK: CPT | Performed by: INTERNAL MEDICINE

## 2018-03-21 PROCEDURE — G8427 DOCREV CUR MEDS BY ELIG CLIN: HCPCS | Performed by: INTERNAL MEDICINE

## 2018-03-21 PROCEDURE — 81001 URINALYSIS AUTO W/SCOPE: CPT | Performed by: INTERNAL MEDICINE

## 2018-03-21 PROCEDURE — G8417 CALC BMI ABV UP PARAM F/U: HCPCS | Performed by: INTERNAL MEDICINE

## 2018-03-21 PROCEDURE — 99215 OFFICE O/P EST HI 40 MIN: CPT | Performed by: INTERNAL MEDICINE

## 2018-03-21 PROCEDURE — G8482 FLU IMMUNIZE ORDER/ADMIN: HCPCS | Performed by: INTERNAL MEDICINE

## 2018-03-21 PROCEDURE — 1123F ACP DISCUSS/DSCN MKR DOCD: CPT | Performed by: INTERNAL MEDICINE

## 2018-03-21 PROCEDURE — 4040F PNEUMOC VAC/ADMIN/RCVD: CPT | Performed by: INTERNAL MEDICINE

## 2018-03-21 PROCEDURE — 36415 COLL VENOUS BLD VENIPUNCTURE: CPT | Performed by: INTERNAL MEDICINE

## 2018-03-21 PROCEDURE — 93000 ELECTROCARDIOGRAM COMPLETE: CPT | Performed by: INTERNAL MEDICINE

## 2018-03-21 RX ORDER — GABAPENTIN 100 MG/1
CAPSULE ORAL
Qty: 60 CAPSULE | Refills: 3 | Status: SHIPPED | OUTPATIENT
Start: 2018-03-21 | End: 2019-03-22 | Stop reason: CLARIF

## 2018-03-21 NOTE — PROGRESS NOTES
Comprehensive Exam      PI:        The patient is a 68year old white male who presents with problems of hypertension, diabetes mellitus, hyperlipidemia, Vitamin D deficiency, and post herpetic neuralgia. Doing well and using his medications as directed and without side effects. No headache, chest pain, or breathing difficulties. He is seeing Dr Lena Abebe  for his yearly eye exams with normal findings. Glucoses at home have been <140 with no hypoglycemic episodes. We have discussed the need to do daily walking for thirty minutes. He is still smoking cigarettes at 4 a day. We discussed the need to stop. He has enjoyment from the smoking. He completed a normal imaging stress test in 8/2014 along with a normal ECHO and abdominal aorta. In November 2014 he completed a normal CXR and his PFTs showed a mild obstructive defect. The FEF 25-75% improved by 38%. He is on Lipitor therapy for hyperlipidemia with no side effects. There is a history of adenomatous colonic polyps where one adenomatous polyp was removed during his colonoscopy with Dr Zechariah Carmona in 1/05/2012. A five year follow up was completed with Dr Zechariah Carmona on 4/13/17. Diverticulosis was noted. A tubular adenoma polyp and a tubulovillous adenoma polyp were removed. A five year follow up was recommended. No abdominal pain, change in bowel habits, hematochezia, or melanotic stools. PMH:    History of diabetes mellitus on diet and oral agent, hyperlipidemia, allergic rhinitis, colonic polyps, diverticulosis, basal cell skin cancer history, and small airway disorder. No history of hypertension, ASHD, or pulmonary disorder. Surgical history of right hemicolectomy for a large adenomatous polyp, inguinal hernia repair Right X 2, skin cancer resection, and left epididymis resection for infection.       MED:   Current Outpatient Prescriptions   Medication Sig Dispense Refill    LORazepam (ATIVAN) 0.5 MG tablet Take one tablet at bedtime as needed. . 30 tablet 2    atorvastatin (LIPITOR) 20 MG tablet TAKE 1 TABLET BY MOUTH EVERY DAY 30 tablet 5    atropine 1 % ophthalmic solution INSTILL 1 DROP INTO RIGHT EYE TWICE A DAY  0    metFORMIN (GLUCOPHAGE-XR) 500 MG extended release tablet TAKE 1 TABLET BY MOUTH ONCE DAILY IN THE EVENING WITH DINNER 30 tablet 5    aspirin 81 MG tablet Take 81 mg by mouth daily.  Cholecalciferol (VITAMIN D3) 2000 UNIT CAPS Take  by mouth daily.  Coenzyme Q-10 50 MG TABS Take  by mouth daily.  calcium-vitamin D (OSCAL-500) 500-200 MG-UNIT per tablet Take 1 tablet by mouth 2 times daily. No current facility-administered medications for this visit. ALL: Codeine and shrimp cause hives. Intolerance to buspar. SH:  Quit smoking cigarettes in 2006 but restarted smoking 4 cigarettes a day. No alcohol usage since , has 3 coffees a day. FH:  Mother  age 80 of ASHD. Father  age 68 of lung cancer. ROS:General:    No weight loss or anorexia. No fever, chills, or night sweats. Head:       No headache, voice change, hoarseness, or swallowing difficulties            Resp:       No wheezing, coughing, or hemoptysis            CV:           No rest or exertional chest pain. No orthopnea or PND. He has noted palpitations. GI:            No abdominal pain, change in bowel habits, hematochezia, or melanotic stool. :          No frequency, urgency, dysuria, or hematuria. Neuro:     No symptoms of cranial nerve dysfunction, gait difficulties, or extremity weakness. Immun:    Up to date on TD, influenza, prevnar, and pneumovax. Needs zoster. PE:    Vitals:      Blood pressure 110/70, pulse 68, height 5' 10\" (1.778 m), weight 204 lb (92.5 kg). GEN:       No acute distress, alert and oriented x3            HENNT:  TMs and auditory canals are clear.

## 2018-03-22 LAB
ESTIMATED AVERAGE GLUCOSE: 119.8 MG/DL
HBA1C MFR BLD: 5.8 %

## 2018-04-10 ENCOUNTER — OFFICE VISIT (OUTPATIENT)
Dept: INTERNAL MEDICINE CLINIC | Age: 78
End: 2018-04-10

## 2018-04-10 VITALS — HEART RATE: 76 BPM | SYSTOLIC BLOOD PRESSURE: 110 MMHG | DIASTOLIC BLOOD PRESSURE: 60 MMHG

## 2018-04-10 DIAGNOSIS — E78.2 MIXED HYPERLIPIDEMIA: ICD-10-CM

## 2018-04-10 DIAGNOSIS — E11.9 TYPE 2 DIABETES MELLITUS WITHOUT COMPLICATION, WITHOUT LONG-TERM CURRENT USE OF INSULIN (HCC): ICD-10-CM

## 2018-04-10 DIAGNOSIS — E87.5 HYPERKALEMIA: ICD-10-CM

## 2018-04-10 DIAGNOSIS — I10 ESSENTIAL HYPERTENSION: Primary | ICD-10-CM

## 2018-04-10 PROCEDURE — 1123F ACP DISCUSS/DSCN MKR DOCD: CPT | Performed by: INTERNAL MEDICINE

## 2018-04-10 PROCEDURE — 4004F PT TOBACCO SCREEN RCVD TLK: CPT | Performed by: INTERNAL MEDICINE

## 2018-04-10 PROCEDURE — 36415 COLL VENOUS BLD VENIPUNCTURE: CPT | Performed by: INTERNAL MEDICINE

## 2018-04-10 PROCEDURE — 99213 OFFICE O/P EST LOW 20 MIN: CPT | Performed by: INTERNAL MEDICINE

## 2018-04-10 PROCEDURE — 4040F PNEUMOC VAC/ADMIN/RCVD: CPT | Performed by: INTERNAL MEDICINE

## 2018-04-10 PROCEDURE — G8427 DOCREV CUR MEDS BY ELIG CLIN: HCPCS | Performed by: INTERNAL MEDICINE

## 2018-04-10 PROCEDURE — G8417 CALC BMI ABV UP PARAM F/U: HCPCS | Performed by: INTERNAL MEDICINE

## 2018-04-11 ENCOUNTER — TELEPHONE (OUTPATIENT)
Dept: INTERNAL MEDICINE CLINIC | Age: 78
End: 2018-04-11

## 2018-04-11 DIAGNOSIS — E87.5 HYPERKALEMIA: Primary | ICD-10-CM

## 2018-04-11 LAB
ANION GAP SERPL CALCULATED.3IONS-SCNC: 17 MMOL/L (ref 3–16)
BUN BLDV-MCNC: 20 MG/DL (ref 7–20)
CALCIUM SERPL-MCNC: 9.9 MG/DL (ref 8.3–10.6)
CHLORIDE BLD-SCNC: 100 MMOL/L (ref 99–110)
CO2: 26 MMOL/L (ref 21–32)
CREAT SERPL-MCNC: 0.8 MG/DL (ref 0.8–1.3)
GFR AFRICAN AMERICAN: >60
GFR NON-AFRICAN AMERICAN: >60
GLUCOSE BLD-MCNC: 98 MG/DL (ref 70–99)
POTASSIUM SERPL-SCNC: 6 MMOL/L (ref 3.5–5.1)
SODIUM BLD-SCNC: 143 MMOL/L (ref 136–145)

## 2018-04-11 RX ORDER — SODIUM POLYSTYRENE SULFONATE 15 G/60ML
15 SUSPENSION ORAL; RECTAL ONCE
Qty: 1 BOTTLE | Refills: 0 | Status: SHIPPED | OUTPATIENT
Start: 2018-04-11 | End: 2021-11-08

## 2018-04-12 DIAGNOSIS — E87.5 HYPERKALEMIA: ICD-10-CM

## 2018-04-12 LAB
ANION GAP SERPL CALCULATED.3IONS-SCNC: 16 MMOL/L (ref 3–16)
BUN BLDV-MCNC: 19 MG/DL (ref 7–20)
CALCIUM SERPL-MCNC: 9.2 MG/DL (ref 8.3–10.6)
CHLORIDE BLD-SCNC: 99 MMOL/L (ref 99–110)
CO2: 26 MMOL/L (ref 21–32)
CREAT SERPL-MCNC: 0.8 MG/DL (ref 0.8–1.3)
GFR AFRICAN AMERICAN: >60
GFR NON-AFRICAN AMERICAN: >60
GLUCOSE BLD-MCNC: 96 MG/DL (ref 70–99)
POTASSIUM SERPL-SCNC: 5.1 MMOL/L (ref 3.5–5.1)
SODIUM BLD-SCNC: 141 MMOL/L (ref 136–145)

## 2018-07-16 RX ORDER — ATORVASTATIN CALCIUM 20 MG/1
TABLET, FILM COATED ORAL
Qty: 90 TABLET | Refills: 3 | Status: SHIPPED | OUTPATIENT
Start: 2018-07-16 | End: 2019-07-31 | Stop reason: SDUPTHER

## 2018-09-17 DIAGNOSIS — I10 ESSENTIAL HYPERTENSION: ICD-10-CM

## 2018-09-17 DIAGNOSIS — F41.9 ANXIETY: Primary | ICD-10-CM

## 2018-09-17 DIAGNOSIS — E78.2 MIXED HYPERLIPIDEMIA: ICD-10-CM

## 2018-09-17 DIAGNOSIS — E11.9 TYPE 2 DIABETES MELLITUS WITHOUT COMPLICATION, WITHOUT LONG-TERM CURRENT USE OF INSULIN (HCC): ICD-10-CM

## 2018-09-17 DIAGNOSIS — E87.5 HYPERKALEMIA: ICD-10-CM

## 2018-09-17 DIAGNOSIS — E03.4 HYPOTHYROIDISM DUE TO ACQUIRED ATROPHY OF THYROID: Primary | ICD-10-CM

## 2018-09-18 RX ORDER — LORAZEPAM 0.5 MG/1
TABLET ORAL
Qty: 30 TABLET | Refills: 2 | Status: SHIPPED | OUTPATIENT
Start: 2018-09-18 | End: 2019-07-26 | Stop reason: SDUPTHER

## 2018-10-03 DIAGNOSIS — E03.4 HYPOTHYROIDISM DUE TO ACQUIRED ATROPHY OF THYROID: ICD-10-CM

## 2018-10-03 DIAGNOSIS — E11.9 TYPE 2 DIABETES MELLITUS WITHOUT COMPLICATION, WITHOUT LONG-TERM CURRENT USE OF INSULIN (HCC): ICD-10-CM

## 2018-10-03 DIAGNOSIS — I10 ESSENTIAL HYPERTENSION: ICD-10-CM

## 2018-10-03 DIAGNOSIS — E78.2 MIXED HYPERLIPIDEMIA: ICD-10-CM

## 2018-10-03 DIAGNOSIS — E87.5 HYPERKALEMIA: ICD-10-CM

## 2018-10-03 LAB
ALBUMIN SERPL-MCNC: 4.3 G/DL (ref 3.4–5)
ALP BLD-CCNC: 88 U/L (ref 40–129)
ALT SERPL-CCNC: 12 U/L (ref 10–40)
ANION GAP SERPL CALCULATED.3IONS-SCNC: 15 MMOL/L (ref 3–16)
AST SERPL-CCNC: 20 U/L (ref 15–37)
BASOPHILS ABSOLUTE: 0.1 K/UL (ref 0–0.2)
BASOPHILS RELATIVE PERCENT: 0.6 %
BILIRUB SERPL-MCNC: 0.5 MG/DL (ref 0–1)
BILIRUBIN DIRECT: <0.2 MG/DL (ref 0–0.3)
BILIRUBIN, INDIRECT: NORMAL MG/DL (ref 0–1)
BUN BLDV-MCNC: 19 MG/DL (ref 7–20)
CALCIUM SERPL-MCNC: 9.9 MG/DL (ref 8.3–10.6)
CHLORIDE BLD-SCNC: 102 MMOL/L (ref 99–110)
CHOLESTEROL, TOTAL: 156 MG/DL (ref 0–199)
CO2: 26 MMOL/L (ref 21–32)
CREAT SERPL-MCNC: 0.9 MG/DL (ref 0.8–1.3)
EOSINOPHILS ABSOLUTE: 0.4 K/UL (ref 0–0.6)
EOSINOPHILS RELATIVE PERCENT: 5 %
GFR AFRICAN AMERICAN: >60
GFR NON-AFRICAN AMERICAN: >60
GLUCOSE BLD-MCNC: 94 MG/DL (ref 70–99)
HCT VFR BLD CALC: 41.1 % (ref 40.5–52.5)
HDLC SERPL-MCNC: 56 MG/DL (ref 40–60)
HEMOGLOBIN: 13.7 G/DL (ref 13.5–17.5)
LDL CHOLESTEROL CALCULATED: 72 MG/DL
LYMPHOCYTES ABSOLUTE: 2.3 K/UL (ref 1–5.1)
LYMPHOCYTES RELATIVE PERCENT: 28.3 %
MCH RBC QN AUTO: 32.5 PG (ref 26–34)
MCHC RBC AUTO-ENTMCNC: 33.4 G/DL (ref 31–36)
MCV RBC AUTO: 97.3 FL (ref 80–100)
MONOCYTES ABSOLUTE: 0.7 K/UL (ref 0–1.3)
MONOCYTES RELATIVE PERCENT: 9 %
NEUTROPHILS ABSOLUTE: 4.6 K/UL (ref 1.7–7.7)
NEUTROPHILS RELATIVE PERCENT: 57.1 %
PDW BLD-RTO: 14.5 % (ref 12.4–15.4)
PLATELET # BLD: 284 K/UL (ref 135–450)
PMV BLD AUTO: 8.6 FL (ref 5–10.5)
POTASSIUM SERPL-SCNC: 5.5 MMOL/L (ref 3.5–5.1)
RBC # BLD: 4.22 M/UL (ref 4.2–5.9)
SODIUM BLD-SCNC: 143 MMOL/L (ref 136–145)
TOTAL CK: 87 U/L (ref 39–308)
TOTAL PROTEIN: 6.8 G/DL (ref 6.4–8.2)
TRIGL SERPL-MCNC: 142 MG/DL (ref 0–150)
TSH SERPL DL<=0.05 MIU/L-ACNC: 4.46 UIU/ML (ref 0.27–4.2)
VLDLC SERPL CALC-MCNC: 28 MG/DL
WBC # BLD: 8 K/UL (ref 4–11)

## 2018-10-04 LAB
ESTIMATED AVERAGE GLUCOSE: 125.5 MG/DL
HBA1C MFR BLD: 6 %

## 2018-10-10 ENCOUNTER — OFFICE VISIT (OUTPATIENT)
Dept: INTERNAL MEDICINE CLINIC | Age: 78
End: 2018-10-10
Payer: MEDICARE

## 2018-10-10 VITALS
HEART RATE: 76 BPM | DIASTOLIC BLOOD PRESSURE: 60 MMHG | BODY MASS INDEX: 28.98 KG/M2 | WEIGHT: 202 LBS | SYSTOLIC BLOOD PRESSURE: 120 MMHG

## 2018-10-10 DIAGNOSIS — E78.2 MIXED HYPERLIPIDEMIA: Primary | ICD-10-CM

## 2018-10-10 DIAGNOSIS — I10 ESSENTIAL HYPERTENSION: ICD-10-CM

## 2018-10-10 DIAGNOSIS — E87.5 HYPERKALEMIA: ICD-10-CM

## 2018-10-10 DIAGNOSIS — E11.9 TYPE 2 DIABETES MELLITUS WITHOUT COMPLICATION, WITHOUT LONG-TERM CURRENT USE OF INSULIN (HCC): ICD-10-CM

## 2018-10-10 LAB
ANION GAP SERPL CALCULATED.3IONS-SCNC: 14 MMOL/L (ref 3–16)
BUN BLDV-MCNC: 22 MG/DL (ref 7–20)
CALCIUM SERPL-MCNC: 9.7 MG/DL (ref 8.3–10.6)
CHLORIDE BLD-SCNC: 102 MMOL/L (ref 99–110)
CO2: 27 MMOL/L (ref 21–32)
CREAT SERPL-MCNC: 0.9 MG/DL (ref 0.8–1.3)
GFR AFRICAN AMERICAN: >60
GFR NON-AFRICAN AMERICAN: >60
GLUCOSE BLD-MCNC: 103 MG/DL (ref 70–99)
POTASSIUM SERPL-SCNC: 4.9 MMOL/L (ref 3.5–5.1)
SODIUM BLD-SCNC: 143 MMOL/L (ref 136–145)

## 2018-10-10 PROCEDURE — 4004F PT TOBACCO SCREEN RCVD TLK: CPT | Performed by: INTERNAL MEDICINE

## 2018-10-10 PROCEDURE — G8482 FLU IMMUNIZE ORDER/ADMIN: HCPCS | Performed by: INTERNAL MEDICINE

## 2018-10-10 PROCEDURE — 1123F ACP DISCUSS/DSCN MKR DOCD: CPT | Performed by: INTERNAL MEDICINE

## 2018-10-10 PROCEDURE — 99213 OFFICE O/P EST LOW 20 MIN: CPT | Performed by: INTERNAL MEDICINE

## 2018-10-10 PROCEDURE — G8427 DOCREV CUR MEDS BY ELIG CLIN: HCPCS | Performed by: INTERNAL MEDICINE

## 2018-10-10 PROCEDURE — 36415 COLL VENOUS BLD VENIPUNCTURE: CPT | Performed by: INTERNAL MEDICINE

## 2018-10-10 PROCEDURE — 1101F PT FALLS ASSESS-DOCD LE1/YR: CPT | Performed by: INTERNAL MEDICINE

## 2018-10-10 PROCEDURE — G8417 CALC BMI ABV UP PARAM F/U: HCPCS | Performed by: INTERNAL MEDICINE

## 2018-10-10 PROCEDURE — 4040F PNEUMOC VAC/ADMIN/RCVD: CPT | Performed by: INTERNAL MEDICINE

## 2018-10-10 ASSESSMENT — PATIENT HEALTH QUESTIONNAIRE - PHQ9
2. FEELING DOWN, DEPRESSED OR HOPELESS: 1
SUM OF ALL RESPONSES TO PHQ9 QUESTIONS 1 & 2: 2
1. LITTLE INTEREST OR PLEASURE IN DOING THINGS: 1
SUM OF ALL RESPONSES TO PHQ QUESTIONS 1-9: 2
SUM OF ALL RESPONSES TO PHQ QUESTIONS 1-9: 2

## 2018-10-15 ENCOUNTER — TELEPHONE (OUTPATIENT)
Dept: INTERNAL MEDICINE CLINIC | Age: 78
End: 2018-10-15

## 2018-10-15 DIAGNOSIS — Z23 NEED FOR PROPHYLACTIC VACCINATION AND INOCULATION AGAINST VARICELLA: ICD-10-CM

## 2018-10-16 ENCOUNTER — NURSE ONLY (OUTPATIENT)
Dept: INTERNAL MEDICINE CLINIC | Age: 78
End: 2018-10-16
Payer: MEDICARE

## 2018-10-16 DIAGNOSIS — Z23 NEED FOR INFLUENZA VACCINATION: Primary | ICD-10-CM

## 2018-10-16 PROCEDURE — G0008 ADMIN INFLUENZA VIRUS VAC: HCPCS | Performed by: INTERNAL MEDICINE

## 2018-10-16 PROCEDURE — 90662 IIV NO PRSV INCREASED AG IM: CPT | Performed by: INTERNAL MEDICINE

## 2018-10-16 NOTE — PROGRESS NOTES
Have you ever had a reaction to a flu vaccine? NO  Are you able to eat eggs without adverse effects? YES  Do you have any current illness? NO  Have you ever had [place] Syndrome? NO    Flu vaccine given per order. Please see immunization tab.

## 2018-11-26 RX ORDER — METFORMIN HYDROCHLORIDE 500 MG/1
TABLET, EXTENDED RELEASE ORAL
Qty: 90 TABLET | Refills: 3 | Status: SHIPPED | OUTPATIENT
Start: 2018-11-26 | End: 2019-10-04 | Stop reason: SDUPTHER

## 2019-03-22 ENCOUNTER — OFFICE VISIT (OUTPATIENT)
Dept: INTERNAL MEDICINE CLINIC | Age: 79
End: 2019-03-22
Payer: MEDICARE

## 2019-03-22 VITALS
HEART RATE: 60 BPM | SYSTOLIC BLOOD PRESSURE: 128 MMHG | BODY MASS INDEX: 29.92 KG/M2 | DIASTOLIC BLOOD PRESSURE: 80 MMHG | WEIGHT: 209 LBS | RESPIRATION RATE: 15 BRPM | HEIGHT: 70 IN

## 2019-03-22 DIAGNOSIS — R73.09 ELEVATED HEMOGLOBIN A1C: ICD-10-CM

## 2019-03-22 DIAGNOSIS — I10 ESSENTIAL HYPERTENSION: ICD-10-CM

## 2019-03-22 DIAGNOSIS — R00.2 PALPITATIONS: ICD-10-CM

## 2019-03-22 DIAGNOSIS — E03.4 HYPOTHYROIDISM DUE TO ACQUIRED ATROPHY OF THYROID: ICD-10-CM

## 2019-03-22 DIAGNOSIS — E78.2 MIXED HYPERLIPIDEMIA: ICD-10-CM

## 2019-03-22 DIAGNOSIS — J44.9 CHRONIC OBSTRUCTIVE PULMONARY DISEASE, UNSPECIFIED COPD TYPE (HCC): Primary | ICD-10-CM

## 2019-03-22 DIAGNOSIS — E55.9 VITAMIN D DEFICIENCY: ICD-10-CM

## 2019-03-22 DIAGNOSIS — E11.9 TYPE 2 DIABETES MELLITUS WITHOUT COMPLICATION, WITHOUT LONG-TERM CURRENT USE OF INSULIN (HCC): ICD-10-CM

## 2019-03-22 PROCEDURE — G8926 SPIRO NO PERF OR DOC: HCPCS | Performed by: INTERNAL MEDICINE

## 2019-03-22 PROCEDURE — 3023F SPIROM DOC REV: CPT | Performed by: INTERNAL MEDICINE

## 2019-03-22 PROCEDURE — 99215 OFFICE O/P EST HI 40 MIN: CPT | Performed by: INTERNAL MEDICINE

## 2019-03-22 PROCEDURE — G8427 DOCREV CUR MEDS BY ELIG CLIN: HCPCS | Performed by: INTERNAL MEDICINE

## 2019-03-22 PROCEDURE — G8417 CALC BMI ABV UP PARAM F/U: HCPCS | Performed by: INTERNAL MEDICINE

## 2019-03-22 PROCEDURE — 4004F PT TOBACCO SCREEN RCVD TLK: CPT | Performed by: INTERNAL MEDICINE

## 2019-03-22 PROCEDURE — 4040F PNEUMOC VAC/ADMIN/RCVD: CPT | Performed by: INTERNAL MEDICINE

## 2019-03-22 PROCEDURE — G8482 FLU IMMUNIZE ORDER/ADMIN: HCPCS | Performed by: INTERNAL MEDICINE

## 2019-03-22 PROCEDURE — 93000 ELECTROCARDIOGRAM COMPLETE: CPT | Performed by: INTERNAL MEDICINE

## 2019-03-22 PROCEDURE — 1123F ACP DISCUSS/DSCN MKR DOCD: CPT | Performed by: INTERNAL MEDICINE

## 2019-03-22 ASSESSMENT — PATIENT HEALTH QUESTIONNAIRE - PHQ9
1. LITTLE INTEREST OR PLEASURE IN DOING THINGS: 0
SUM OF ALL RESPONSES TO PHQ QUESTIONS 1-9: 0
2. FEELING DOWN, DEPRESSED OR HOPELESS: 0
SUM OF ALL RESPONSES TO PHQ9 QUESTIONS 1 & 2: 0
SUM OF ALL RESPONSES TO PHQ QUESTIONS 1-9: 0

## 2019-03-22 NOTE — PROGRESS NOTES
Shingrix. PE:    Vitals:      Blood pressure 128/80, pulse 60, resp. rate 15, height 5' 10\" (1.778 m), weight 209 lb (94.8 kg). GEN:       No acute distress, alert and oriented x3            HENNT:  TMs and auditory canals are clear. Pupils equal and reactive to light. EOMs intact. Fundi are clear and discs are flat. Oropharynx is clear. NECK:     Supple without palpable lymph nodes. Thyroid exam is normal.            LUNGS:   Clear to auscultation. CV:          Regular pulse. Normal S1 & S2. No murmur. Axilla:      No palpable nodes. ABD:       Bowel sounds normal. There is no distention. No abdominal bruits. No tenderness, guarding, rebound, masses, or organomegaly. MARCOS:      No hernia or palpable lymphadenopathy. RECT:    Sphincter tone was normal. Prostate was enlarged but without nodules. Stool nonmelanotic and quaiac negative. EXT:       No edema. Distal pulses are normal.            SKIN:      No abnormal skin lesions were seen. NEURO: Cranial nerves II-XII grossly intact. Gait was normal. Moving all extremities well. Normal light touch to the feet. IMP:      1. Diabetes mellitus                2. Hyperlipidemia               3. Hypothyroidism               4. Vitamin D deficiency               5. COPD                6. adenomatous colonic polyps               7. Prostate hypertrophy               8. Glucose intolerance      PLAN:  1. Comp Chem, CBC, Lipid, TSH, UA, PSA, CPK, Vitamin D, and EKG              2. Hgba1c & urine for microalbumin              3. TDap prescription given               4. Continue present medications              5. Daily walking              6. Return in two weeks    Forty-five minutes were spent with the patient by myself.  Greater than 50% of the time was in discussion with the patient for  and coordination of care.  To review medical problems,  treatment of medical problems, review of systems, update problem list, and conduct a comprehensive physical exam.

## 2019-04-04 ENCOUNTER — OFFICE VISIT (OUTPATIENT)
Dept: INTERNAL MEDICINE CLINIC | Age: 79
End: 2019-04-04
Payer: MEDICARE

## 2019-04-04 VITALS — SYSTOLIC BLOOD PRESSURE: 122 MMHG | DIASTOLIC BLOOD PRESSURE: 80 MMHG | HEART RATE: 72 BPM | RESPIRATION RATE: 15 BRPM

## 2019-04-04 DIAGNOSIS — E78.2 MIXED HYPERLIPIDEMIA: ICD-10-CM

## 2019-04-04 DIAGNOSIS — E11.9 TYPE 2 DIABETES MELLITUS WITHOUT COMPLICATION, WITHOUT LONG-TERM CURRENT USE OF INSULIN (HCC): Primary | ICD-10-CM

## 2019-04-04 DIAGNOSIS — I10 ESSENTIAL HYPERTENSION: ICD-10-CM

## 2019-04-04 DIAGNOSIS — J98.4 SMALL AIRWAYS DISEASE: ICD-10-CM

## 2019-04-04 DIAGNOSIS — E87.5 HYPERKALEMIA: ICD-10-CM

## 2019-04-04 PROCEDURE — 99213 OFFICE O/P EST LOW 20 MIN: CPT | Performed by: INTERNAL MEDICINE

## 2019-04-04 PROCEDURE — 4040F PNEUMOC VAC/ADMIN/RCVD: CPT | Performed by: INTERNAL MEDICINE

## 2019-04-04 PROCEDURE — G8417 CALC BMI ABV UP PARAM F/U: HCPCS | Performed by: INTERNAL MEDICINE

## 2019-04-04 PROCEDURE — 4004F PT TOBACCO SCREEN RCVD TLK: CPT | Performed by: INTERNAL MEDICINE

## 2019-04-04 PROCEDURE — G8427 DOCREV CUR MEDS BY ELIG CLIN: HCPCS | Performed by: INTERNAL MEDICINE

## 2019-04-04 PROCEDURE — 1123F ACP DISCUSS/DSCN MKR DOCD: CPT | Performed by: INTERNAL MEDICINE

## 2019-04-04 RX ORDER — TRIAMCINOLONE ACETONIDE 1 MG/G
CREAM TOPICAL
Qty: 80 TUBE | Refills: 1 | Status: SHIPPED | OUTPATIENT
Start: 2019-04-04 | End: 2019-07-26 | Stop reason: SDUPTHER

## 2019-04-16 NOTE — PROGRESS NOTES
S: Patient presents with problems of hypertension, diabetes mellitus, and hyperlipidemia. He has developed a dermatitis over lower exts. He is still smoking and understand the needs to stop. He is following a low fat low carb low salt diet. No exertional or resting chest pain. No palpitations, dizziness, or syncope. No wheezing or coughing. O:Blood pressure 122/80, pulse 72, resp. rate 15. HEENT: TMs & canals were clear, oral pharynx clear      Neck: No palpable lymph nodes, normal thyroid examination. Lungs: Diffuse decreased BS, no wheezing       Cardio: reg pulse      Ext: no edema      Skin: eczema type dermatitis on lower exts         Labs: from 3/26/19 CBC & UA normal, LDL 56 HDL 58 Trig 160, Vit D 39, TSH 3.36, hgba1c 5.7%, CK 59, Na 142 K 5.4 Cl 100 BUN 19 Creat 0.9 Glucose 105, Liver normal         EKG: sinus bradycardia     A: diabetes mellitus on oral agents      Hypertension controlled      Hyperlipidemia on Lipitor      Small airway disorder      Smoking    P: copy of labs given       Continue present medications and diet.        Daily walking      Stop smoking, encouraged to stop       Kenalog cream 0.1% bid to dermatitis & call results       Return in March 2019 for H&P

## 2019-07-26 DIAGNOSIS — F41.9 ANXIETY: ICD-10-CM

## 2019-07-26 RX ORDER — LORAZEPAM 0.5 MG/1
TABLET ORAL
Qty: 30 TABLET | Refills: 2 | Status: SHIPPED | OUTPATIENT
Start: 2019-07-26 | End: 2020-07-02 | Stop reason: SDUPTHER

## 2019-07-26 RX ORDER — TRIAMCINOLONE ACETONIDE 1 MG/G
CREAM TOPICAL
Qty: 80 TUBE | Refills: 1 | Status: SHIPPED | OUTPATIENT
Start: 2019-07-26 | End: 2021-04-21

## 2019-07-31 RX ORDER — ATORVASTATIN CALCIUM 20 MG/1
TABLET, FILM COATED ORAL
Qty: 90 TABLET | Refills: 3 | Status: SHIPPED | OUTPATIENT
Start: 2019-07-31 | End: 2020-09-11 | Stop reason: SDUPTHER

## 2019-10-03 DIAGNOSIS — E11.9 TYPE 2 DIABETES MELLITUS WITHOUT COMPLICATION, WITHOUT LONG-TERM CURRENT USE OF INSULIN (HCC): ICD-10-CM

## 2019-10-03 DIAGNOSIS — I10 ESSENTIAL HYPERTENSION: ICD-10-CM

## 2019-10-03 DIAGNOSIS — E87.5 HYPERKALEMIA: ICD-10-CM

## 2019-10-03 DIAGNOSIS — E78.2 MIXED HYPERLIPIDEMIA: ICD-10-CM

## 2019-10-03 LAB
ALBUMIN SERPL-MCNC: 4.7 G/DL (ref 3.4–5)
ALP BLD-CCNC: 89 U/L (ref 40–129)
ALT SERPL-CCNC: 13 U/L (ref 10–40)
ANION GAP SERPL CALCULATED.3IONS-SCNC: 18 MMOL/L (ref 3–16)
AST SERPL-CCNC: 20 U/L (ref 15–37)
BASOPHILS ABSOLUTE: 0 K/UL (ref 0–0.2)
BASOPHILS RELATIVE PERCENT: 0.4 %
BILIRUB SERPL-MCNC: 0.6 MG/DL (ref 0–1)
BILIRUBIN DIRECT: <0.2 MG/DL (ref 0–0.3)
BILIRUBIN, INDIRECT: NORMAL MG/DL (ref 0–1)
BUN BLDV-MCNC: 20 MG/DL (ref 7–20)
CALCIUM SERPL-MCNC: 9.8 MG/DL (ref 8.3–10.6)
CHLORIDE BLD-SCNC: 102 MMOL/L (ref 99–110)
CHOLESTEROL, TOTAL: 160 MG/DL (ref 0–199)
CO2: 26 MMOL/L (ref 21–32)
CREAT SERPL-MCNC: 0.8 MG/DL (ref 0.8–1.3)
EOSINOPHILS ABSOLUTE: 0.4 K/UL (ref 0–0.6)
EOSINOPHILS RELATIVE PERCENT: 4 %
GFR AFRICAN AMERICAN: >60
GFR NON-AFRICAN AMERICAN: >60
GLUCOSE BLD-MCNC: 97 MG/DL (ref 70–99)
HCT VFR BLD CALC: 43.6 % (ref 40.5–52.5)
HDLC SERPL-MCNC: 68 MG/DL (ref 40–60)
HEMOGLOBIN: 14.4 G/DL (ref 13.5–17.5)
LDL CHOLESTEROL CALCULATED: 63 MG/DL
LYMPHOCYTES ABSOLUTE: 2.1 K/UL (ref 1–5.1)
LYMPHOCYTES RELATIVE PERCENT: 23.8 %
MCH RBC QN AUTO: 32.4 PG (ref 26–34)
MCHC RBC AUTO-ENTMCNC: 33.1 G/DL (ref 31–36)
MCV RBC AUTO: 97.7 FL (ref 80–100)
MONOCYTES ABSOLUTE: 0.7 K/UL (ref 0–1.3)
MONOCYTES RELATIVE PERCENT: 7.3 %
NEUTROPHILS ABSOLUTE: 5.8 K/UL (ref 1.7–7.7)
NEUTROPHILS RELATIVE PERCENT: 64.5 %
PDW BLD-RTO: 14.1 % (ref 12.4–15.4)
PLATELET # BLD: 297 K/UL (ref 135–450)
PMV BLD AUTO: 8.8 FL (ref 5–10.5)
POTASSIUM SERPL-SCNC: 5.4 MMOL/L (ref 3.5–5.1)
RBC # BLD: 4.46 M/UL (ref 4.2–5.9)
SODIUM BLD-SCNC: 146 MMOL/L (ref 136–145)
TOTAL CK: 85 U/L (ref 39–308)
TOTAL PROTEIN: 7.4 G/DL (ref 6.4–8.2)
TRIGL SERPL-MCNC: 147 MG/DL (ref 0–150)
VLDLC SERPL CALC-MCNC: 29 MG/DL
WBC # BLD: 9 K/UL (ref 4–11)

## 2019-10-04 ENCOUNTER — OFFICE VISIT (OUTPATIENT)
Dept: INTERNAL MEDICINE CLINIC | Age: 79
End: 2019-10-04
Payer: MEDICARE

## 2019-10-04 VITALS
SYSTOLIC BLOOD PRESSURE: 130 MMHG | DIASTOLIC BLOOD PRESSURE: 70 MMHG | HEART RATE: 58 BPM | BODY MASS INDEX: 29.84 KG/M2 | WEIGHT: 208 LBS

## 2019-10-04 DIAGNOSIS — Z23 NEED FOR INFLUENZA VACCINATION: ICD-10-CM

## 2019-10-04 DIAGNOSIS — E78.2 MIXED HYPERLIPIDEMIA: ICD-10-CM

## 2019-10-04 DIAGNOSIS — I10 ESSENTIAL HYPERTENSION: ICD-10-CM

## 2019-10-04 DIAGNOSIS — E87.5 HYPERKALEMIA: ICD-10-CM

## 2019-10-04 DIAGNOSIS — E11.9 TYPE 2 DIABETES MELLITUS WITHOUT COMPLICATION, WITHOUT LONG-TERM CURRENT USE OF INSULIN (HCC): Primary | ICD-10-CM

## 2019-10-04 LAB
ESTIMATED AVERAGE GLUCOSE: 114 MG/DL
HBA1C MFR BLD: 5.6 %

## 2019-10-04 PROCEDURE — 4004F PT TOBACCO SCREEN RCVD TLK: CPT | Performed by: INTERNAL MEDICINE

## 2019-10-04 PROCEDURE — G8417 CALC BMI ABV UP PARAM F/U: HCPCS | Performed by: INTERNAL MEDICINE

## 2019-10-04 PROCEDURE — 36415 COLL VENOUS BLD VENIPUNCTURE: CPT | Performed by: INTERNAL MEDICINE

## 2019-10-04 PROCEDURE — G8427 DOCREV CUR MEDS BY ELIG CLIN: HCPCS | Performed by: INTERNAL MEDICINE

## 2019-10-04 PROCEDURE — 90653 IIV ADJUVANT VACCINE IM: CPT | Performed by: INTERNAL MEDICINE

## 2019-10-04 PROCEDURE — G8482 FLU IMMUNIZE ORDER/ADMIN: HCPCS | Performed by: INTERNAL MEDICINE

## 2019-10-04 PROCEDURE — 99213 OFFICE O/P EST LOW 20 MIN: CPT | Performed by: INTERNAL MEDICINE

## 2019-10-04 PROCEDURE — 1123F ACP DISCUSS/DSCN MKR DOCD: CPT | Performed by: INTERNAL MEDICINE

## 2019-10-04 PROCEDURE — 4040F PNEUMOC VAC/ADMIN/RCVD: CPT | Performed by: INTERNAL MEDICINE

## 2019-10-04 PROCEDURE — G0008 ADMIN INFLUENZA VIRUS VAC: HCPCS | Performed by: INTERNAL MEDICINE

## 2019-10-04 RX ORDER — METFORMIN HYDROCHLORIDE 500 MG/1
TABLET, EXTENDED RELEASE ORAL
Qty: 90 TABLET | Refills: 3 | Status: SHIPPED | OUTPATIENT
Start: 2019-10-04 | End: 2020-01-08 | Stop reason: SDUPTHER

## 2019-10-05 LAB
ANION GAP SERPL CALCULATED.3IONS-SCNC: 15 MMOL/L (ref 3–16)
BUN BLDV-MCNC: 22 MG/DL (ref 7–20)
CALCIUM SERPL-MCNC: 10.1 MG/DL (ref 8.3–10.6)
CHLORIDE BLD-SCNC: 101 MMOL/L (ref 99–110)
CO2: 26 MMOL/L (ref 21–32)
CREAT SERPL-MCNC: 0.9 MG/DL (ref 0.8–1.3)
GFR AFRICAN AMERICAN: >60
GFR NON-AFRICAN AMERICAN: >60
GLUCOSE BLD-MCNC: 103 MG/DL (ref 70–99)
POTASSIUM SERPL-SCNC: 5.4 MMOL/L (ref 3.5–5.1)
SODIUM BLD-SCNC: 142 MMOL/L (ref 136–145)

## 2019-10-18 DIAGNOSIS — E87.5 HYPERKALEMIA: Primary | ICD-10-CM

## 2019-10-19 LAB
BUN / CREAT RATIO: 28 (CALC) (ref 7–25)
BUN BLDV-MCNC: 25 MG/DL (ref 3–29)
CALCIUM SERPL-MCNC: 9.8 MG/DL (ref 8.5–10.5)
CHLORIDE BLD-SCNC: 102 MEQ/L (ref 96–110)
CO2: 26 MEQ/L (ref 19–32)
CREAT SERPL-MCNC: 0.9 MG/DL
GFR SERPL CREATININE-BSD FRML MDRD: 82 ML/MIN/1.73M2
GLUCOSE BLD-MCNC: 103 MG/DL
POTASSIUM SERPL-SCNC: 4.4 MEQ/L (ref 3.4–5.3)
SODIUM BLD-SCNC: 141 MEQ/L (ref 135–148)

## 2020-01-08 RX ORDER — METFORMIN HYDROCHLORIDE 500 MG/1
TABLET, EXTENDED RELEASE ORAL
Qty: 90 TABLET | Refills: 3 | Status: SHIPPED | OUTPATIENT
Start: 2020-01-08 | End: 2021-01-21 | Stop reason: SDUPTHER

## 2020-07-02 ENCOUNTER — OFFICE VISIT (OUTPATIENT)
Dept: INTERNAL MEDICINE CLINIC | Age: 80
End: 2020-07-02
Payer: MEDICARE

## 2020-07-02 VITALS
DIASTOLIC BLOOD PRESSURE: 62 MMHG | WEIGHT: 208 LBS | HEIGHT: 70 IN | BODY MASS INDEX: 29.78 KG/M2 | SYSTOLIC BLOOD PRESSURE: 130 MMHG | OXYGEN SATURATION: 98 % | HEART RATE: 101 BPM | TEMPERATURE: 97.3 F

## 2020-07-02 PROBLEM — F51.04 CHRONIC INSOMNIA: Status: ACTIVE | Noted: 2020-07-02

## 2020-07-02 PROCEDURE — G8417 CALC BMI ABV UP PARAM F/U: HCPCS | Performed by: FAMILY MEDICINE

## 2020-07-02 PROCEDURE — 1123F ACP DISCUSS/DSCN MKR DOCD: CPT | Performed by: FAMILY MEDICINE

## 2020-07-02 PROCEDURE — 4004F PT TOBACCO SCREEN RCVD TLK: CPT | Performed by: FAMILY MEDICINE

## 2020-07-02 PROCEDURE — 99213 OFFICE O/P EST LOW 20 MIN: CPT | Performed by: FAMILY MEDICINE

## 2020-07-02 PROCEDURE — G8427 DOCREV CUR MEDS BY ELIG CLIN: HCPCS | Performed by: FAMILY MEDICINE

## 2020-07-02 PROCEDURE — 4040F PNEUMOC VAC/ADMIN/RCVD: CPT | Performed by: FAMILY MEDICINE

## 2020-07-02 RX ORDER — LORAZEPAM 0.5 MG/1
0.5 TABLET ORAL NIGHTLY PRN
Qty: 10 TABLET | Refills: 3 | Status: SHIPPED | OUTPATIENT
Start: 2020-07-02 | End: 2020-08-01

## 2020-07-02 ASSESSMENT — ENCOUNTER SYMPTOMS
EYE ITCHING: 0
ABDOMINAL DISTENTION: 0
NAUSEA: 0
CHEST TIGHTNESS: 0
EYE REDNESS: 0
TROUBLE SWALLOWING: 0
WHEEZING: 0
ABDOMINAL PAIN: 0
CONSTIPATION: 0
SINUS PAIN: 0
SHORTNESS OF BREATH: 0
DIARRHEA: 0
VOMITING: 0
SORE THROAT: 0
BACK PAIN: 0
COUGH: 0

## 2020-07-02 ASSESSMENT — PATIENT HEALTH QUESTIONNAIRE - PHQ9
2. FEELING DOWN, DEPRESSED OR HOPELESS: 0
SUM OF ALL RESPONSES TO PHQ QUESTIONS 1-9: 0
SUM OF ALL RESPONSES TO PHQ9 QUESTIONS 1 & 2: 0
SUM OF ALL RESPONSES TO PHQ QUESTIONS 1-9: 0
1. LITTLE INTEREST OR PLEASURE IN DOING THINGS: 0

## 2020-07-02 NOTE — PROGRESS NOTES
Subjective:      Chief Complaint: Diabetes mellitus chronic insulin    HPI:  Hamilton Pryor is a 78 y.o. male who presents today for below medical conditions    Diabetes mellitus: Patient diabetes has been stable on home medication metformin 500 mg twice daily. Patient denies having any polyuria polydipsia or numbness tingling lower extremities. Patient last hemoglobin A1c was at goal of 5.6%. Chronic insomnia: Occasionally maybe once or twice a month. Patient is not able to sleep and take lorazepam 0.5 mg 1 tablet nightly. His 30 tablets usually last months. Patient agreed to receive 10 tablets/month with refills. Discontinuation plan: Agreed with the patient to provide him lorazepam 0.5 mg 1 tablet nightly as needed basis. Patient received 10 tablets/month.       Patient Active Problem List   Diagnosis    Type 2 diabetes mellitus ([facility]    Hyperlipidemia    Diverticulosis    Hx of adenomatous colonic polyps    Family history of prostate cancer    Anxiety    Dyspnea    History of 24 hour EKG monitoring    Giant cell arteritis ([facility]    Shingles    Chronic insomnia       Past Medical History:   Diagnosis Date    Anxiety     Bradycardia     Cancer ([facility] 07/2001    basal cell CA right preauricle    Colonic polyps     adenoma    Diabetes mellitus ([facility]     Diverticulosis     scattered    Dyspnea 8/2014    Hyperlipidemia     Internal hemorrhoids 1/2012    Tubular adenoma of colon 1/2012    sigmoid        Social History     Tobacco Use    Smoking status: Current Every Day Smoker     Packs/day: 0.25     Years: 63.00     Pack years: 15.75     Types: Cigarettes     Start date: 1/1/1955    Smokeless tobacco: Never Used   Substance Use Topics    Alcohol use: No     Comment: stopped alcohol in 1991, drinks 3 to 4 cups of coffee per day        Family History   Problem Relation Age of Onset    Heart Disease Mother     Cancer Father     Cancer Brother        Review of Systems Constitutional: Negative for appetite change, chills, fatigue, fever and unexpected weight change. HENT: Negative for congestion, ear pain, sinus pain, sore throat and trouble swallowing. Eyes: Negative for redness and itching. Respiratory: Negative for cough, chest tightness, shortness of breath and wheezing. Cardiovascular: Negative for chest pain and palpitations. Gastrointestinal: Negative for abdominal distention, abdominal pain, constipation, diarrhea, nausea and vomiting. Endocrine: Negative for polyuria. Genitourinary: Negative for difficulty urinating, dysuria, frequency and urgency. Musculoskeletal: Positive for arthralgias. Negative for back pain and myalgias. Skin: Negative for rash. Neurological: Negative for dizziness and headaches. Psychiatric/Behavioral: Positive for sleep disturbance. Negative for behavioral problems, confusion and suicidal ideas. Objective:      /62   Pulse 101   Temp 97.3 °F (36.3 °C)   Ht 5' 10\" (1.778 m)   Wt 208 lb (94.3 kg)   SpO2 98%   BMI 29.84 kg/m²      Physical Exam  Vitals signs reviewed. Constitutional:       Appearance: Normal appearance. He is well-developed. HENT:      Head: Normocephalic and atraumatic. Right Ear: External ear normal.      Left Ear: External ear normal.      Mouth/Throat:      Mouth: Mucous membranes are moist.      Pharynx: Oropharynx is clear. Eyes:      Extraocular Movements: Extraocular movements intact. Conjunctiva/sclera: Conjunctivae normal.      Pupils: Pupils are equal, round, and reactive to light. Neck:      Musculoskeletal: Normal range of motion and neck supple. Thyroid: No thyromegaly or thyroid tenderness. Vascular: No carotid bruit. Cardiovascular:      Rate and Rhythm: Normal rate and regular rhythm. Pulses: Normal pulses. Heart sounds: Normal heart sounds, S1 normal and S2 normal. No murmur.    Pulmonary:      Effort: Pulmonary effort is normal. provided. Advice to call clinic or me for any further questions or concern.        Jose Aguilar, DO

## 2020-08-27 ENCOUNTER — OFFICE VISIT (OUTPATIENT)
Dept: INTERNAL MEDICINE CLINIC | Age: 80
End: 2020-08-27
Payer: MEDICARE

## 2020-08-27 VITALS
DIASTOLIC BLOOD PRESSURE: 78 MMHG | TEMPERATURE: 97.3 F | SYSTOLIC BLOOD PRESSURE: 110 MMHG | WEIGHT: 214.2 LBS | HEIGHT: 70 IN | HEART RATE: 97 BPM | OXYGEN SATURATION: 96 % | BODY MASS INDEX: 30.67 KG/M2

## 2020-08-27 PROBLEM — G47.09 OTHER INSOMNIA: Status: ACTIVE | Noted: 2020-07-02

## 2020-08-27 PROCEDURE — 4004F PT TOBACCO SCREEN RCVD TLK: CPT | Performed by: FAMILY MEDICINE

## 2020-08-27 PROCEDURE — G8427 DOCREV CUR MEDS BY ELIG CLIN: HCPCS | Performed by: FAMILY MEDICINE

## 2020-08-27 PROCEDURE — G8417 CALC BMI ABV UP PARAM F/U: HCPCS | Performed by: FAMILY MEDICINE

## 2020-08-27 PROCEDURE — 1123F ACP DISCUSS/DSCN MKR DOCD: CPT | Performed by: FAMILY MEDICINE

## 2020-08-27 PROCEDURE — 99214 OFFICE O/P EST MOD 30 MIN: CPT | Performed by: FAMILY MEDICINE

## 2020-08-27 PROCEDURE — 4040F PNEUMOC VAC/ADMIN/RCVD: CPT | Performed by: FAMILY MEDICINE

## 2020-08-27 RX ORDER — LORAZEPAM 0.5 MG/1
1 TABLET ORAL NIGHTLY PRN
COMMUNITY
Start: 2020-08-21 | End: 2021-04-21 | Stop reason: SDUPTHER

## 2020-08-27 ASSESSMENT — ENCOUNTER SYMPTOMS
SORE THROAT: 0
CONSTIPATION: 0
ABDOMINAL PAIN: 0
VOMITING: 0
COLOR CHANGE: 0
DIARRHEA: 0
CHEST TIGHTNESS: 0
SHORTNESS OF BREATH: 0

## 2020-08-27 NOTE — PROGRESS NOTES
Subjective:      Chief Complaint   Patient presents with    Annual Exam       HPI:  More Jain is a 78 y.o. male who presents today to establish care with new provider and for management of chronic medical conditions as below. DM:  Does not check his glucose regularly at home. Takes ativan as needed for sleep- states he can go months without it, sometimes uses it a few times a week. Gets a RX for 10 a month and fills it if he needs it. Seeing ophthalmology, no other specialists. Feeling well today, no acute concerns. Labs reviewed. Past Medical History:   Diagnosis Date    Anxiety     Bradycardia     Cancer ([facility] [address] 07/2001    basal cell CA right preauricle    Colonic polyps     adenoma    Diabetes mellitus ([facility] [address]     Diverticulosis     scattered    Dyspnea 8/2014    Hyperlipidemia     Internal hemorrhoids 1/2012    Tubular adenoma of colon 1/2012    sigmoid        Past Surgical History:   Procedure Laterality Date    COLONOSCOPY  2012    polypodectomy     HEMICOLECTOMY  5/06    right    INGUINAL HERNIA REPAIR      MOHS SURGERY      resection       Social History     Tobacco Use    Smoking status: Current Every Day Smoker     Packs/day: 0.25     Years: 63.00     Pack years: 15.75     Types: Cigarettes     Start date: 1/1/1955    Smokeless tobacco: Never Used   Substance Use Topics    Alcohol use: No     Comment: stopped alcohol in 1991, drinks 3 to 4 cups of coffee per day        Review of Systems   Constitutional: Negative for activity change, appetite change, chills, fever and unexpected weight change. HENT: Negative for congestion and sore throat. Respiratory: Negative for chest tightness and shortness of breath. Cardiovascular: Negative for chest pain and palpitations. Gastrointestinal: Negative for abdominal pain, constipation, diarrhea and vomiting. Genitourinary: Negative for dysuria. Skin: Negative for color change.    Neurological: Negative for dizziness and light-headedness. Psychiatric/Behavioral: Negative for dysphoric mood. The patient is not nervous/anxious. Prior to Visit Medications    Medication Sig Taking? Authorizing Provider   LORazepam (ATIVAN) 0.5 MG tablet Take 1 tablet by mouth nightly as needed. Yes Historical Provider, MD   metFORMIN (GLUCOPHAGE-XR) 500 MG extended release tablet TAKE 1 TABLET BY MOUTH ONCE DAILY IN THE EVENING WITH DINNER Yes Jose Garvin DO   atorvastatin (LIPITOR) 20 MG tablet TAKE 1 TABLET BY MOUTH EVERY DAY Yes Justin Oliva MD   triamcinolone (KENALOG) 0.1 % cream Apply topically 2-4 times daily. Yes Justin Oliva MD   aspirin 81 MG tablet Take 81 mg by mouth daily. Yes Historical Provider, MD   Cholecalciferol (VITAMIN D3) 2000 UNIT CAPS Take  by mouth daily. Yes Historical Provider, MD   Coenzyme Q-10 50 MG TABS Take  by mouth daily. Yes Historical Provider, MD   calcium-vitamin D (OSCAL-500) 500-200 MG-UNIT per tablet Take 1 tablet by mouth 2 times daily. Yes Historical Provider, MD   sodium polystyrene (SPS) 15 GM/60ML suspension Take 60 mLs by mouth once for 1 dose  Tiana Castillo MD   atropine 1 % ophthalmic solution INSTILL 1 DROP INTO RIGHT EYE TWICE A DAY  Historical Provider, MD          Objective:      /78   Pulse 97   Temp 97.3 °F (36.3 °C)   Ht 5' 10\" (1.778 m)   Wt 214 lb 3.2 oz (97.2 kg)   SpO2 96%   BMI 30.73 kg/m²      Physical Exam  Vitals signs and nursing note reviewed. Constitutional:       General: He is not in acute distress. Appearance: Normal appearance. He is not ill-appearing or toxic-appearing. HENT:      Head: Normocephalic and atraumatic. Right Ear: External ear normal.      Left Ear: External ear normal.      Nose: Nose normal.      Mouth/Throat:      Pharynx: Oropharynx is clear. Eyes:      General: No scleral icterus. Right eye: No discharge. Left eye: No discharge.       Extraocular Movements: Extraocular movements intact. Conjunctiva/sclera: Conjunctivae normal.   Neck:      Musculoskeletal: Normal range of motion and neck supple. No neck rigidity. Cardiovascular:      Rate and Rhythm: Normal rate and regular rhythm. Heart sounds: Normal heart sounds. Pulmonary:      Effort: Pulmonary effort is normal.      Breath sounds: Normal breath sounds. No wheezing or rales. Musculoskeletal:         General: No deformity. Skin:     General: Skin is warm and dry. Findings: No rash. Neurological:      General: No focal deficit present. Mental Status: He is alert. Mental status is at baseline. Motor: No weakness. Psychiatric:         Mood and Affect: Mood normal.         Behavior: Behavior normal.            Assessment / Plan:      1. Type 2 diabetes mellitus without complication, without long-term current use of insulin (HCC)  Last HbA1c 5.6, currently taking 500mg metformin daily. Will recheck, if still <6, will try discontinuing metformin and monitor.  - Hemoglobin A1C; Future    2. Essential hypertension  Stable, well controlled without medication.   - CBC Auto Differential; Future  - Comprehensive Metabolic Panel; Future  - Lipid Panel; Future    3. Mixed hyperlipidemia  Continue lipitor.  - Lipid Panel; Future     4. Insomnia  Taking ativan as needed for sleep- uses a few times a month. May continue, reviewed risks/side effects and encouraged to minimize use as much as possible. Patient voiced understanding and agreement with plan. All questions/concerns were addressed, risks/side effects of medications were reviewed. Return precautions and after visit summary were provided. Return in about 6 months (around 2/27/2021). or earlier as needed.       Roberth Pepper MD

## 2020-09-14 RX ORDER — ATORVASTATIN CALCIUM 20 MG/1
TABLET, FILM COATED ORAL
Qty: 90 TABLET | Refills: 3 | Status: SHIPPED | OUTPATIENT
Start: 2020-09-14 | End: 2020-09-14 | Stop reason: SDUPTHER

## 2020-09-15 ENCOUNTER — HOSPITAL ENCOUNTER (OUTPATIENT)
Age: 80
Discharge: HOME | End: 2020-09-15
Payer: MEDICARE

## 2020-09-15 LAB
ALBUMIN SERPL-MCNC: 4.7 GM/DL (ref 3.4–5)
ALP BLD-CCNC: 82 IU/L (ref 40–128)
ALT SERPL-CCNC: 15 U/L (ref 10–40)
ANION GAP SERPL CALCULATED.3IONS-SCNC: 12 MMOL/L (ref 4–16)
AST SERPL-CCNC: 24 IU/L (ref 15–37)
BASOPHILS ABSOLUTE: 0 K/CU MM
BASOPHILS RELATIVE PERCENT: 0.5 % (ref 0–1)
BILIRUB SERPL-MCNC: 0.7 MG/DL (ref 0–1)
BUN BLDV-MCNC: 21 MG/DL (ref 6–23)
CALCIUM SERPL-MCNC: 10.1 MG/DL (ref 8.3–10.6)
CHLORIDE BLD-SCNC: 100 MMOL/L (ref 99–110)
CHOLESTEROL: 169 MG/DL
CO2: 28 MMOL/L (ref 21–32)
CREAT SERPL-MCNC: 1 MG/DL (ref 0.9–1.3)
DIFFERENTIAL TYPE: ABNORMAL
EOSINOPHILS ABSOLUTE: 0.3 K/CU MM
EOSINOPHILS RELATIVE PERCENT: 4 % (ref 0–3)
ESTIMATED AVERAGE GLUCOSE: 123 MG/DL
GFR AFRICAN AMERICAN: >60 ML/MIN/1.73M2
GFR NON-AFRICAN AMERICAN: >60 ML/MIN/1.73M2
GLUCOSE BLD-MCNC: 103 MG/DL (ref 70–99)
HBA1C MFR BLD: 5.9 % (ref 4.2–6.3)
HCT VFR BLD CALC: 45.1 % (ref 42–52)
HDLC SERPL-MCNC: 59 MG/DL
HEMOGLOBIN: 14.5 GM/DL (ref 13.5–18)
IMMATURE NEUTROPHIL %: 0.2 % (ref 0–0.43)
LDL CHOLESTEROL DIRECT: 80 MG/DL
LYMPHOCYTES ABSOLUTE: 2 K/CU MM
LYMPHOCYTES RELATIVE PERCENT: 22.9 % (ref 24–44)
MCH RBC QN AUTO: 31.9 PG (ref 27–31)
MCHC RBC AUTO-ENTMCNC: 32.2 % (ref 32–36)
MCV RBC AUTO: 99.1 FL (ref 78–100)
MONOCYTES ABSOLUTE: 0.6 K/CU MM
MONOCYTES RELATIVE PERCENT: 7.3 % (ref 0–4)
NUCLEATED RBC %: 0 %
PDW BLD-RTO: 13.2 % (ref 11.7–14.9)
PLATELET # BLD: 312 K/CU MM (ref 140–440)
PMV BLD AUTO: 11 FL (ref 7.5–11.1)
POTASSIUM SERPL-SCNC: 4.9 MMOL/L (ref 3.5–5.1)
RBC # BLD: 4.55 M/CU MM (ref 4.6–6.2)
SEGMENTED NEUTROPHILS ABSOLUTE COUNT: 5.6 K/CU MM
SEGMENTED NEUTROPHILS RELATIVE PERCENT: 65.1 % (ref 36–66)
SODIUM BLD-SCNC: 140 MMOL/L (ref 135–145)
TOTAL IMMATURE NEUTOROPHIL: 0.02 K/CU MM
TOTAL NUCLEATED RBC: 0 K/CU MM
TOTAL PROTEIN: 7.2 GM/DL (ref 6.4–8.2)
TRIGL SERPL-MCNC: 197 MG/DL
WBC # BLD: 8.6 K/CU MM (ref 4–10.5)

## 2020-09-15 PROCEDURE — 36415 COLL VENOUS BLD VENIPUNCTURE: CPT

## 2020-09-15 PROCEDURE — 83036 HEMOGLOBIN GLYCOSYLATED A1C: CPT

## 2020-09-15 PROCEDURE — 83721 ASSAY OF BLOOD LIPOPROTEIN: CPT

## 2020-09-15 PROCEDURE — 80061 LIPID PANEL: CPT

## 2020-09-15 PROCEDURE — 80053 COMPREHEN METABOLIC PANEL: CPT

## 2020-09-15 PROCEDURE — 85025 COMPLETE CBC W/AUTO DIFF WBC: CPT

## 2020-09-15 RX ORDER — ATORVASTATIN CALCIUM 20 MG/1
TABLET, FILM COATED ORAL
Qty: 90 TABLET | Refills: 3 | Status: SHIPPED | OUTPATIENT
Start: 2020-09-15 | End: 2021-08-06

## 2021-01-21 RX ORDER — METFORMIN HYDROCHLORIDE 500 MG/1
TABLET, EXTENDED RELEASE ORAL
Qty: 90 TABLET | Refills: 3 | Status: ON HOLD | OUTPATIENT
Start: 2021-01-21 | End: 2021-11-12

## 2021-04-21 ENCOUNTER — OFFICE VISIT (OUTPATIENT)
Dept: INTERNAL MEDICINE CLINIC | Age: 81
End: 2021-04-21
Payer: MEDICARE

## 2021-04-21 VITALS
BODY MASS INDEX: 30.84 KG/M2 | WEIGHT: 215.4 LBS | HEART RATE: 75 BPM | SYSTOLIC BLOOD PRESSURE: 128 MMHG | HEIGHT: 70 IN | OXYGEN SATURATION: 97 % | TEMPERATURE: 97.3 F | DIASTOLIC BLOOD PRESSURE: 71 MMHG

## 2021-04-21 DIAGNOSIS — J30.9 ALLERGIC RHINITIS, UNSPECIFIED SEASONALITY, UNSPECIFIED TRIGGER: ICD-10-CM

## 2021-04-21 DIAGNOSIS — Z23 NEED FOR TDAP VACCINATION: ICD-10-CM

## 2021-04-21 DIAGNOSIS — G47.00 INSOMNIA, UNSPECIFIED TYPE: ICD-10-CM

## 2021-04-21 DIAGNOSIS — R42 VERTIGO: ICD-10-CM

## 2021-04-21 DIAGNOSIS — E78.2 MIXED HYPERLIPIDEMIA: ICD-10-CM

## 2021-04-21 DIAGNOSIS — Z12.5 SCREENING FOR PROSTATE CANCER: ICD-10-CM

## 2021-04-21 DIAGNOSIS — E11.9 TYPE 2 DIABETES MELLITUS WITHOUT COMPLICATION, WITHOUT LONG-TERM CURRENT USE OF INSULIN (HCC): Primary | ICD-10-CM

## 2021-04-21 DIAGNOSIS — F41.9 ANXIETY: ICD-10-CM

## 2021-04-21 LAB
BASOPHILS ABSOLUTE: 0.1 K/UL (ref 0–0.2)
BASOPHILS RELATIVE PERCENT: 0.8 %
CHP ED QC CHECK: NORMAL
CREATININE URINE: 200.4 MG/DL (ref 39–259)
EOSINOPHILS ABSOLUTE: 0.2 K/UL (ref 0–0.6)
EOSINOPHILS RELATIVE PERCENT: 2.6 %
GLUCOSE BLD-MCNC: 108 MG/DL
HCT VFR BLD CALC: 40.8 % (ref 40.5–52.5)
HEMOGLOBIN: 14 G/DL (ref 13.5–17.5)
LYMPHOCYTES ABSOLUTE: 2 K/UL (ref 1–5.1)
LYMPHOCYTES RELATIVE PERCENT: 24.6 %
MCH RBC QN AUTO: 32.8 PG (ref 26–34)
MCHC RBC AUTO-ENTMCNC: 34.5 G/DL (ref 31–36)
MCV RBC AUTO: 95.1 FL (ref 80–100)
MICROALBUMIN UR-MCNC: <1.2 MG/DL
MICROALBUMIN/CREAT UR-RTO: NORMAL MG/G (ref 0–30)
MONOCYTES ABSOLUTE: 0.7 K/UL (ref 0–1.3)
MONOCYTES RELATIVE PERCENT: 8.1 %
NEUTROPHILS ABSOLUTE: 5.2 K/UL (ref 1.7–7.7)
NEUTROPHILS RELATIVE PERCENT: 63.9 %
PDW BLD-RTO: 14.1 % (ref 12.4–15.4)
PLATELET # BLD: 274 K/UL (ref 135–450)
PMV BLD AUTO: 9.5 FL (ref 5–10.5)
RBC # BLD: 4.29 M/UL (ref 4.2–5.9)
WBC # BLD: 8.2 K/UL (ref 4–11)

## 2021-04-21 PROCEDURE — 82962 GLUCOSE BLOOD TEST: CPT | Performed by: INTERNAL MEDICINE

## 2021-04-21 PROCEDURE — 1123F ACP DISCUSS/DSCN MKR DOCD: CPT | Performed by: INTERNAL MEDICINE

## 2021-04-21 PROCEDURE — G8427 DOCREV CUR MEDS BY ELIG CLIN: HCPCS | Performed by: INTERNAL MEDICINE

## 2021-04-21 PROCEDURE — 4004F PT TOBACCO SCREEN RCVD TLK: CPT | Performed by: INTERNAL MEDICINE

## 2021-04-21 PROCEDURE — 36415 COLL VENOUS BLD VENIPUNCTURE: CPT | Performed by: INTERNAL MEDICINE

## 2021-04-21 PROCEDURE — 4040F PNEUMOC VAC/ADMIN/RCVD: CPT | Performed by: INTERNAL MEDICINE

## 2021-04-21 PROCEDURE — 99214 OFFICE O/P EST MOD 30 MIN: CPT | Performed by: INTERNAL MEDICINE

## 2021-04-21 PROCEDURE — G8417 CALC BMI ABV UP PARAM F/U: HCPCS | Performed by: INTERNAL MEDICINE

## 2021-04-21 RX ORDER — MECLIZINE HCL 12.5 MG/1
12.5 TABLET ORAL 3 TIMES DAILY PRN
Qty: 30 TABLET | Refills: 1 | Status: SHIPPED | OUTPATIENT
Start: 2021-04-21

## 2021-04-21 RX ORDER — LORAZEPAM 0.5 MG/1
0.5 TABLET ORAL NIGHTLY PRN
Qty: 10 TABLET | Refills: 1 | Status: SHIPPED | OUTPATIENT
Start: 2021-04-21 | End: 2021-10-01 | Stop reason: SDUPTHER

## 2021-04-21 RX ORDER — CETIRIZINE HYDROCHLORIDE 5 MG/1
5 TABLET ORAL DAILY PRN
Qty: 30 TABLET | Refills: 3 | Status: SHIPPED | OUTPATIENT
Start: 2021-04-21

## 2021-04-21 SDOH — ECONOMIC STABILITY: FOOD INSECURITY: WITHIN THE PAST 12 MONTHS, YOU WORRIED THAT YOUR FOOD WOULD RUN OUT BEFORE YOU GOT MONEY TO BUY MORE.: NEVER TRUE

## 2021-04-21 SDOH — ECONOMIC STABILITY: INCOME INSECURITY: HOW HARD IS IT FOR YOU TO PAY FOR THE VERY BASICS LIKE FOOD, HOUSING, MEDICAL CARE, AND HEATING?: NOT HARD AT ALL

## 2021-04-21 SDOH — ECONOMIC STABILITY: FOOD INSECURITY: WITHIN THE PAST 12 MONTHS, THE FOOD YOU BOUGHT JUST DIDN'T LAST AND YOU DIDN'T HAVE MONEY TO GET MORE.: NEVER TRUE

## 2021-04-21 ASSESSMENT — PATIENT HEALTH QUESTIONNAIRE - PHQ9
SUM OF ALL RESPONSES TO PHQ QUESTIONS 1-9: 0
2. FEELING DOWN, DEPRESSED OR HOPELESS: 0
SUM OF ALL RESPONSES TO PHQ QUESTIONS 1-9: 0
SUM OF ALL RESPONSES TO PHQ QUESTIONS 1-9: 0

## 2021-04-21 NOTE — PROGRESS NOTES
Provider, MD       LAB DATA: Reviewed. REVIEW OF SYSTEMS:   see HPI/ Comprehensive review of systems negative except for the ones mentioned in HPI. PHYSICAL EXAMINATION:   /71 (Site: Right Upper Arm, Position: Sitting, Cuff Size: Medium Adult)   Pulse 75   Temp 97.3 °F (36.3 °C)   Ht 5' 10\" (1.778 m)   Wt 215 lb 6.4 oz (97.7 kg)   SpO2 97%   BMI 30.91 kg/m²      GENERAL APPEARANCE:    Alert, oriented x 3, well developed, cooperative, not in any distress, appears stated age. HEAD:   Normocephalic, atraumatic   EYES:   PERRLA, EOMI, lids normal, conjuctivea clear, sclera anicteric. NECK:    Supple, symmetrical,  trachea midline, no thyromegaly, no JVD, no lymphadenopathy. LUNGS:    Clear to auscultation bilaterally, respirations unlabored, accessory muscles are not used. HEART:     Regular rate and rhythm, S1 and S2 normal, no murmur, rub or gallop. PMI in MCL. ABDOMEN:    Soft, non-tender, bowel sounds are normoactive, no masses, no hepatospleenomegaly. EXTREMITY:   no bipedal edema  NEURO:  Alert, oriented to person, place and time. Grossly intact. Musculoskeletal:         No kyphosis or scoliosis, no deformity in any extremity noted, muscle strength and tone are normal.  Skin:                            Warm and dry. No rash or obvious suspicious lesions. PSYCH:  Mood euthymic, insight and judgement good. ASSESSMENT/PLAN:    1. Type 2 diabetes mellitus without complication, without long-term current use of insulin ([facility] [address]  Patient does not want to check his sugars at home. He claims every time he checks his sugar it it is around 100-130 most of the time. Advised low sugar diet, exercise and weight loss. - POCT Glucose  - Hemoglobin A1C  - Comprehensive Metabolic Panel  - CBC Auto Differential  - Microalbumin / Creatinine Urine Ratio    2. Mixed hyperlipidemia  Patient is taking cholesterol medications regularly. Denies any side effects.   Diet and exercise advised. Continue Lipitor  - Lipid, Fasting    3. Anxiety  PDMP reviewed, no signs of drug abuse.  - LORazepam (ATIVAN) 0.5 MG tablet; Take 1 tablet by mouth nightly as needed for Anxiety (insomnia) for up to 30 days. Dispense: 10 tablet; Refill: 1    4. Insomnia, unspecified type  Advised take lorazepam very sparingly.  - LORazepam (ATIVAN) 0.5 MG tablet; Take 1 tablet by mouth nightly as needed for Anxiety (insomnia) for up to 30 days. Dispense: 10 tablet; Refill: 1    5. Vertigo  Advised take meclizine only when he has dizzy spells. - meclizine (ANTIVERT) 12.5 MG tablet; Take 1 tablet by mouth 3 times daily as needed for Dizziness  Dispense: 30 tablet; Refill: 1    6. Allergic rhinitis, unspecified seasonality, unspecified trigger  We will try Zyrtec 5 mg daily as needed. - cetirizine (ZYRTEC) 5 MG tablet; Take 1 tablet by mouth daily as needed for Allergies or Rhinitis  Dispense: 30 tablet; Refill: 3    7. Screening for prostate cancer  - PSA screening    8. Need for Tdap vaccination  - Tetanus-Diphth-Acell Pertussis (BOOSTRIX) injection 0.5 mL    I have recommended that the patient follow CDC guidelines for prevention of COVID-19 infection. I also discussed Coronavirus precaution including wearing face mask, handwashing practice, wiping items touched in public such as gas pumps, door handles, shopping carts, etc. Also Self monitoring for infection - fever, chills, cough, SOB. Should he/she develop symptoms he/she should call office or go to ER for further instructions. Care discussed with patient. Questions answered and patient verbalizes understanding and agrees with plan. Medications reviewed and reconciled. Continue current medications. Appropriate prescriptions are ordered. Risks and benefits of meds are discussed. After visit summary provided. Advised to call for any problems, questions, or concerns. If symptoms worsen or don't improve as expected, to call us or go to ER.     Follow up as directed, sooner if needed. Return in about 3 months (around 7/21/2021). This dictation was performed with a verbal recognition program and it was checked for errors. It is possible that there are still dictated errors within this office note. Any errors should be brought immediately to my attention for correction. All efforts were made to ensure that this office note is accurate.      David Mace MD

## 2021-04-22 LAB
A/G RATIO: 1.6 (ref 1.1–2.2)
ALBUMIN SERPL-MCNC: 4.5 G/DL (ref 3.4–5)
ALP BLD-CCNC: 76 U/L (ref 40–129)
ALT SERPL-CCNC: 13 U/L (ref 10–40)
ANION GAP SERPL CALCULATED.3IONS-SCNC: 11 MMOL/L (ref 3–16)
AST SERPL-CCNC: 21 U/L (ref 15–37)
BILIRUB SERPL-MCNC: 0.5 MG/DL (ref 0–1)
BUN BLDV-MCNC: 21 MG/DL (ref 7–20)
CALCIUM SERPL-MCNC: 9.8 MG/DL (ref 8.3–10.6)
CHLORIDE BLD-SCNC: 104 MMOL/L (ref 99–110)
CHOLESTEROL, FASTING: 158 MG/DL (ref 0–199)
CO2: 27 MMOL/L (ref 21–32)
CREAT SERPL-MCNC: 1.1 MG/DL (ref 0.8–1.3)
ESTIMATED AVERAGE GLUCOSE: 116.9 MG/DL
GFR AFRICAN AMERICAN: >60
GFR NON-AFRICAN AMERICAN: >60
GLOBULIN: 2.8 G/DL
GLUCOSE BLD-MCNC: 105 MG/DL (ref 70–99)
HBA1C MFR BLD: 5.7 %
HDLC SERPL-MCNC: 49 MG/DL (ref 40–60)
LDL CHOLESTEROL CALCULATED: 79 MG/DL
POTASSIUM SERPL-SCNC: 5.1 MMOL/L (ref 3.5–5.1)
PROSTATE SPECIFIC ANTIGEN: 0.03 NG/ML (ref 0–4)
SODIUM BLD-SCNC: 142 MMOL/L (ref 136–145)
TOTAL PROTEIN: 7.3 G/DL (ref 6.4–8.2)
TRIGLYCERIDE, FASTING: 151 MG/DL (ref 0–150)
VLDLC SERPL CALC-MCNC: 30 MG/DL

## 2021-05-21 PROBLEM — Z12.5 SCREENING FOR PROSTATE CANCER: Status: RESOLVED | Noted: 2021-04-21 | Resolved: 2021-05-21

## 2021-07-21 ENCOUNTER — OFFICE VISIT (OUTPATIENT)
Dept: INTERNAL MEDICINE CLINIC | Age: 81
End: 2021-07-21
Payer: MEDICARE

## 2021-07-21 VITALS
DIASTOLIC BLOOD PRESSURE: 86 MMHG | OXYGEN SATURATION: 97 % | HEART RATE: 104 BPM | WEIGHT: 212 LBS | SYSTOLIC BLOOD PRESSURE: 114 MMHG | BODY MASS INDEX: 30.42 KG/M2

## 2021-07-21 DIAGNOSIS — E78.2 MIXED HYPERLIPIDEMIA: ICD-10-CM

## 2021-07-21 DIAGNOSIS — Z72.0 TOBACCO ABUSE: ICD-10-CM

## 2021-07-21 DIAGNOSIS — E11.9 TYPE 2 DIABETES MELLITUS WITHOUT COMPLICATION, WITHOUT LONG-TERM CURRENT USE OF INSULIN (HCC): Primary | ICD-10-CM

## 2021-07-21 DIAGNOSIS — G47.00 INSOMNIA, UNSPECIFIED TYPE: ICD-10-CM

## 2021-07-21 DIAGNOSIS — F41.9 ANXIETY: ICD-10-CM

## 2021-07-21 DIAGNOSIS — R07.89 CHEST TIGHTNESS: ICD-10-CM

## 2021-07-21 DIAGNOSIS — R42 VERTIGO: ICD-10-CM

## 2021-07-21 DIAGNOSIS — J30.9 ALLERGIC RHINITIS, UNSPECIFIED SEASONALITY, UNSPECIFIED TRIGGER: ICD-10-CM

## 2021-07-21 LAB
CHP ED QC CHECK: NORMAL
GLUCOSE BLD-MCNC: 101 MG/DL

## 2021-07-21 PROCEDURE — 4004F PT TOBACCO SCREEN RCVD TLK: CPT | Performed by: INTERNAL MEDICINE

## 2021-07-21 PROCEDURE — G8427 DOCREV CUR MEDS BY ELIG CLIN: HCPCS | Performed by: INTERNAL MEDICINE

## 2021-07-21 PROCEDURE — G8417 CALC BMI ABV UP PARAM F/U: HCPCS | Performed by: INTERNAL MEDICINE

## 2021-07-21 PROCEDURE — 82962 GLUCOSE BLOOD TEST: CPT | Performed by: INTERNAL MEDICINE

## 2021-07-21 PROCEDURE — 99214 OFFICE O/P EST MOD 30 MIN: CPT | Performed by: INTERNAL MEDICINE

## 2021-07-21 PROCEDURE — 4040F PNEUMOC VAC/ADMIN/RCVD: CPT | Performed by: INTERNAL MEDICINE

## 2021-07-21 PROCEDURE — 1123F ACP DISCUSS/DSCN MKR DOCD: CPT | Performed by: INTERNAL MEDICINE

## 2021-07-21 RX ORDER — FLUTICASONE PROPIONATE 50 MCG
2 SPRAY, SUSPENSION (ML) NASAL DAILY
Qty: 1 BOTTLE | Refills: 3 | Status: SHIPPED | OUTPATIENT
Start: 2021-07-21

## 2021-07-21 RX ORDER — NICOTINE 21 MG/24HR
1 PATCH, TRANSDERMAL 24 HOURS TRANSDERMAL DAILY
Qty: 30 PATCH | Refills: 2 | Status: SHIPPED | OUTPATIENT
Start: 2021-07-21 | End: 2021-11-08

## 2021-07-21 RX ORDER — MONTELUKAST SODIUM 10 MG/1
10 TABLET ORAL DAILY
Qty: 30 TABLET | Refills: 3 | Status: SHIPPED | OUTPATIENT
Start: 2021-07-21 | End: 2021-12-06

## 2021-07-21 NOTE — PROGRESS NOTES
OF SYSTEMS:   see HPI/ Comprehensive review of systems negative except for the ones mentioned in HPI. PHYSICAL EXAMINATION:   /86   Pulse 104   Wt 212 lb (96.2 kg)   SpO2 97%   BMI 30.42 kg/m²      GENERAL APPEARANCE:    Alert, oriented x 3, well developed, cooperative, not in any distress, appears stated age. HEAD:   Normocephalic, atraumatic, mild nasal congestion  EYES:   PERRLA, EOMI, lids normal, conjuctivea clear, sclera anicteric. NECK:    Supple, symmetrical,  trachea midline, no thyromegaly, no JVD, no lymphadenopathy. LUNGS:    Clear to auscultation bilaterally, respirations unlabored, accessory muscles are not used. HEART:     Regular rate and rhythm, S1 and S2 normal, no murmur, rub or gallop. PMI in MCL. ABDOMEN:    Soft, non-tender, bowel sounds are normoactive, no masses, no hepatospleenomegaly. EXTREMITY:   no bipedal edema  NEURO:  Alert, oriented to person, place and time. Grossly intact. Musculoskeletal:         No kyphosis or scoliosis, no deformity in any extremity noted, muscle strength and tone are normal.  Skin:                            Warm and dry. No rash or obvious suspicious lesions. PSYCH:  Mood euthymic, insight and judgement good. ASSESSMENT/PLAN:    1. Type 2 diabetes mellitus without complication, without long-term current use of insulin (HCC)  Advised diet, exercise and weight loss. Continue Glucophage  - POCT Glucose    2. Mixed hyperlipidemia  Patient is taking cholesterol medications regularly. Denies any side effects. Diet and exercise advised. Continue Lipitor    3. Allergic rhinitis, unspecified seasonality, unspecified trigger  Patient unable to take Zyrtec because of drowsiness with it. Try Singulair and Flonase.  - montelukast (SINGULAIR) 10 MG tablet; Take 1 tablet by mouth daily  Dispense: 30 tablet; Refill: 3  - fluticasone (FLONASE) 50 MCG/ACT nasal spray; 2 sprays by Each Nostril route daily  Dispense: 1 Bottle;  Refill: 3    4. Anxiety  Takes Ativan rarely  PDMP reviewed, no signs of drug abuse. Patient does not want any refills on Ativan at this time. 5. Vertigo  Meclizine 12.5 mg every 8 hours as needed    6. Insomnia, unspecified type  Patient takes Ativan rarely for his insomnia    7. Chest tightness  Refer to cardiologist.  Chelsea Edgar to continue on baby aspirin daily and continue to take Lipitor. Advised strongly to quit smoking.  - Reji Vieyra MD, Cardiology, [facility]    8. Tobacco abuse  Advised strongly to quit smoking. Start on nicotine patches. - nicotine (NICODERM CQ) 14 MG/24HR; Place 1 patch onto the skin daily  Dispense: 30 patch; Refill: 2    Advised to follow COVID-19 precautions    Care discussed with patient. Questions answered and patient verbalizes understanding and agrees with plan. Medications reviewed and reconciled. Continue current medications. Appropriate prescriptions are ordered. Risks and benefits of meds are discussed. After visit summary provided. Advised to call for any problems, questions, or concerns. If symptoms worsen or don't improve as expected, to call us or go to ER. Follow up as directed, sooner if needed. Return in about 3 months (around 10/21/2021). This dictation was performed with a verbal recognition program and it was checked for errors. It is possible that there are still dictated errors within this office note. Any errors should be brought immediately to my attention for correction. All efforts were made to ensure that this office note is accurate.      Darrel Clifton MD MD

## 2021-07-23 ENCOUNTER — INITIAL CONSULT (OUTPATIENT)
Dept: CARDIOLOGY CLINIC | Age: 81
End: 2021-07-23
Payer: MEDICARE

## 2021-07-23 ENCOUNTER — TELEPHONE (OUTPATIENT)
Dept: CARDIOLOGY CLINIC | Age: 81
End: 2021-07-23

## 2021-07-23 VITALS
HEART RATE: 59 BPM | BODY MASS INDEX: 30.49 KG/M2 | SYSTOLIC BLOOD PRESSURE: 100 MMHG | DIASTOLIC BLOOD PRESSURE: 60 MMHG | WEIGHT: 213 LBS | HEIGHT: 70 IN

## 2021-07-23 DIAGNOSIS — E11.9 TYPE 2 DIABETES MELLITUS WITHOUT COMPLICATION, WITHOUT LONG-TERM CURRENT USE OF INSULIN (HCC): ICD-10-CM

## 2021-07-23 DIAGNOSIS — E78.2 MIXED HYPERLIPIDEMIA: ICD-10-CM

## 2021-07-23 DIAGNOSIS — R07.2 PRECORDIAL PAIN: Primary | ICD-10-CM

## 2021-07-23 DIAGNOSIS — R06.09 DYSPNEA ON EXERTION: ICD-10-CM

## 2021-07-23 PROCEDURE — G8427 DOCREV CUR MEDS BY ELIG CLIN: HCPCS | Performed by: INTERNAL MEDICINE

## 2021-07-23 PROCEDURE — 1123F ACP DISCUSS/DSCN MKR DOCD: CPT | Performed by: INTERNAL MEDICINE

## 2021-07-23 PROCEDURE — 4004F PT TOBACCO SCREEN RCVD TLK: CPT | Performed by: INTERNAL MEDICINE

## 2021-07-23 PROCEDURE — 99204 OFFICE O/P NEW MOD 45 MIN: CPT | Performed by: INTERNAL MEDICINE

## 2021-07-23 PROCEDURE — 4040F PNEUMOC VAC/ADMIN/RCVD: CPT | Performed by: INTERNAL MEDICINE

## 2021-07-23 PROCEDURE — G8417 CALC BMI ABV UP PARAM F/U: HCPCS | Performed by: INTERNAL MEDICINE

## 2021-07-23 PROCEDURE — 93000 ELECTROCARDIOGRAM COMPLETE: CPT | Performed by: INTERNAL MEDICINE

## 2021-07-23 RX ORDER — METHYLPREDNISOLONE 32 MG/1
TABLET ORAL
Qty: 2 TABLET | Refills: 0 | Status: SHIPPED | OUTPATIENT
Start: 2021-07-23 | End: 2021-11-08

## 2021-07-23 NOTE — TELEPHONE ENCOUNTER
Pt here in office & educated on [facility] for Dx: Chest Pain, SOB, scheduled for 7/29/21 @ 1 PM, w/arrival @ 11 AM, @ [facility]; risks explained; & consents signed. Pre-admission orders given to pt. Instructions given to pt to:  NPO after midnight night before procedure; Call hospital @ [phone] to pre-register; May take rest of morning meds. am of procedure; & pt voiced understanding. Copies of consent, pre-testing orders, & info. sheet scanned into chart.

## 2021-07-23 NOTE — LETTER
[facility]      [address] WITH POSSIBLE PERCUTANEOUS CORONARY INTERVENTION     Patient Name: Fred Meehan   : 1940   MRN# [id number]    Date of Procedure: 21 Time: 1 PM Arrival Time: 11 AM    The catheterization and angiogram are usually outpatient procedures, however if stenting is needed you may need to stay overnight. You will need to arrive at the hospital two hours before the procedure. You will need to arrange for someone to drive you home. You will go to registration in the main lobby. HOSPITAL:  [facility])  Call to [facility] at: [phone] 1-2 days before your procedure. Please have blood work and chest-x-ray done 1 to 2 days before procedure at    [facility]. X Please do not have anything by mouth after midnight prior to or 8 hours before the procedure. X You may take your medications with a sip of water in the morning before your procedure or  take them with you. X If you are taking Metformin (Glucophage) or any medication containing Metformin (Glucophage) you must hold this medication the day before the procedure 21 , the day of your procedure and two days after your procedure. You may restart Metformin two days after your procedure on 21. X Please be sure to notify the staff if you have any allergy to DYE, IODINE, AND SHELLFISH. If you have an allergy YOU WILL NEED AN ORDER FOR PREDNISONE 32 mg to be taken 12 hours and 2 hours before your  procedure. After your COVID-19 Test, lab work, and Chest Xray are completed you will need to DUVED yourself until procedure.            Patient Signature:  _________________________           Staff Giving Instructions_______________________________               [place] [place] [facility]    Dr. Lexy Shukla     Patient Name: Fred Meehan   : 1940   MRN# [id number]    Date of Procedure: 21 Time: 1 PM     DIAGNOSIS:   Z01.810    LEFT HEART CATHETERIZATION WITH POSSIBLE PERCUTANEOUS CORONARY INTERVENTION               X Chest x-Ray PA & Lateral View             X Type & Screen  X CBC  X BMP  X PT &     X PTT  (if taking Coumadin)                             ? PLEASE CALL ABNORMAL RESULTS TO THE  PHYSICIAN? ATTENTION PATIENT:   Pretesting is to be done before the cath. You do not have to fast for the lab work. You must go to the [facility] behind the [facility] at [address] to have this lab work done. Phone: [phone]   Hours: 7:00 am to 7:00 pm          8:00 am to 12:00 pm Saturday          PHYSICIAN SIGNATURE:     DATE:_________________Time:______________                                    [facility] ([facility]    Pre Cath Lab Orders     Patient Name: Debrayd Alpers   : 1940   MRN# [id number]    Pre Cath Lab orders     1. IV of 0.9 NS@ 75ml/hr started 2 hours prior to procedure. (#20 Gauge Insyte or larger)    2. Diazepam (Valium) 5 mg po ONCE in Cath Lab. 3. Diphenhydramine (Benadryl) 25 mg ONCE. PHYSICIAN SIGNATURE:      DATE:____ TIME:______________                                                                             [facility] ([facility]) Informed Consent for Anesthesia/Sedation, Surgery, Invasive Procedures, and other High-risk Interventions and Medication use      *This consent is applicable for 30 days following patient signature*    Procedure(s)   I, Cloyd Alpers authorize, Dr. Jaky Ny  and the associate(s) or assistant(s) of his/her choice, to perform the following procedure(s): [address]       I know that unexpected conditions may require additional or different procedures than those above. I authorize the above named practitioner(s) perform these as necessary and desirable.     This is based on the practitioners professional judgment. The above named practitioner has discussed the above procedure(s) with me, including:   Potential benefits, including likelihood of success of the procedure(s) goals   Risks   Side effects, risk of death, and risk of infection   Any potential problems that might occur during recuperation or healing post-procedure   Reasonable alternatives   Risks of NOT performing the procedure(s)    I acknowledge that no warranty or guarantee has been made to the results the procedure(s). I consent to the above named practitioner(s) providing additional services to me as deemed reasonable and necessary, including but not limited to:     Use of medications for anesthesia or sedation.  All anesthesia and sedation carry risks. My practitioner has discussed my anticipated anesthesia and/or sedation and the risks of using, risk of not using, benefits, side effects, and alternatives.  Use of pathology  - I authorize [facility]) to dispose of tissues, specimens or organs when pathology is complete.  Use of radiology  - A contrast agent may be required for radiology procedures. My practitioner has advised me of the risks of using, risks of not using, benefits, side effects, and alternatives.  Observers or use of photography, video/audio recording, or televising of the procedure(s). This is for medical, scientific, or educational purposes. This includes appropriate portions of my body. My identity will not be revealed.  I consent to release of my social security number and other identifying information to [employer] (FDA), and the supplier/, if I receive tissue, a device, or implant. This is to track the tissue, device, or implant for defect, recall, infection, etc.      Use of blood and/or blood products, if needed, through my hospital stay.   My practitioner has advised me of the risks of CORONARY INTERVENTION     Patient Name: Jong Navarro   : 1940   MRN# [id number]    Home Phone Number: [phone]   Weight:    Wt Readings from Last 3 Encounters:   21 213 lb (96.6 kg)   21 212 lb (96.2 kg)   21 215 lb 6.4 oz (97.7 kg)        Insurance: Payor: Marleni Born / Plan: Nathanael Smith / Product Type: *No Product type* /     Date of Procedure: 21 Time: 1 PM Arrival Time: 11 AM    Diagnosis:  Chest Pain, SOB  Allergies:    Allergies   Allergen Reactions    Buspar [Buspirone Hcl]      Dizziness    Codeine     Shrimp Flavor [place], [place]

## 2021-07-23 NOTE — PROGRESS NOTES
CARDIAC CONSULT NOTE       Reyna Rahman  [de-identified] y.o.  male    Chief Complaint   Patient presents with    Hyperlipidemia    Chest Pain       Referring physician:  Amari Engel MD     Primary care physician:  Amari Engel MD    History of Present Illness:     Reyna Rahman is a [de-identified] y.o. male referred for evaluation and management of chest pain. CHEST PAIN:  1. When did it began:Chest tightness, intermittant for about 6 months. 2. How long does it last:few minutes  3. How severe:5/10  4. Radiation:no  5. Aggravating factors:Physical exertion. Last year he could walk without any symptoms, now gets chest pain after walking half a block. 6. Relieving factors: resting  7. Associated features: HARDING,dizziness & nausea  8. Tenderness to palpation:no    Patient has significant Cardiac risk factors of male gender, smoking ( > 60 years ) & DM ( more than 10 years )     has a past medical history of Anxiety, Bradycardia, Cancer ([address] Colonic polyps, Diabetes mellitus ([address] Diverticulosis, Dyspnea, Hyperlipidemia, Internal hemorrhoids, and Tubular adenoma of colon. has a past surgical history that includes hemicolectomy (5/06); Mohs surgery; Inguinal hernia repair; and Colonoscopy (2012). reports that he has been smoking cigarettes. He started smoking about 66 years ago. He has a 15.75 pack-year smoking history. He has never used smokeless tobacco. He reports that he does not drink alcohol and does not use drugs. family history includes Cancer in his brother and father; Heart Disease in his mother. Review of Systems:   1. Cardiovascular: + chest pain, dyspnea on exertion, no palpitations or loss of consciousness  2. Respiratory: No cough or wheezing    3. Musculoskeletal:  No gait disturbance, weakness, muscle cramps, aches & pains or joint complaints  4. Neurological: No TIA or stroke symptoms  5. Psychiatric: No anxiety or depression  6.  Hematologic/Lymphatic: No bleeding problems, blood clots or swollen lymph nodes    Physical Examination:    /60   Pulse 59   Ht 5' 10\" (1.778 m)   Wt 213 lb (96.6 kg)   BMI 30.56 kg/m²    Wt Readings from Last 3 Encounters:   07/23/21 213 lb (96.6 kg)   07/21/21 212 lb (96.2 kg)   04/21/21 215 lb 6.4 oz (97.7 kg)     Body mass index is 30.56 kg/m². General Appearance:  Non-obese/Well Nourished  1. Skin: It is warm & dry. No rashes noted. 2. Eyes: No conjunctival Pallor seen. No jaundice noted. 3. Neck: is supple there is no elevation of JVD. No thyromegaly  4. Respiratory:  · Resp Assessment: No abnormal findings. · Resp Auscultation: Vesicular breath sounds without rales or wheezing. 5. Cardiovascular:  · Auscultation: Normal S1 & S2, No prominent murmurs  · Carotid Arteries: No bruits present  · Abdominal Aorta: Non-palpable  · Pedal Pulses: 2+ and equal   6. Abdomen:  · No masses or tenderness  · Liver/Spleen: No Abnormalities Noted, no organomegaly. 7. Musculoskeletal: No joint deformities. No muscle wasting  8. Extremities:  ·  No Cyanosis or Clubbing. No significant edema   9. Rectal / genital:  ·  Deferred  10.  Neurological/Psychiatric:  · Oriented to time, place, and person  · Non-anxious    Lab Results   Component Value Date    CKTOTAL 85 10/03/2019    CKTOTAL 59 03/26/2019     BNP:  No results found for: BNP, PROBNP  PT/INR:  No results found for: PTINR  Lab Results   Component Value Date    LABA1C 5.7 04/21/2021    LABA1C 5.9 09/15/2020     Lab Results   Component Value Date    CHOL 169 09/15/2020    CHOL 160 10/03/2019    TRIG 197 (H) 09/15/2020    TRIG 147 10/03/2019    HDL 49 04/21/2021    HDL 59 09/15/2020    LDLCALC 79 04/21/2021    LDLCALC 63 10/03/2019    LDLDIRECT 80 09/15/2020     Lab Results   Component Value Date    ALT 13 04/21/2021    ALT 15 09/15/2020    AST 21 04/21/2021    AST 24 09/15/2020     BMP:    Lab Results   Component Value Date     04/21/2021     09/15/2020    K 5.1 04/21/2021    K 4.9 09/15/2020     04/21/2021     09/15/2020    CO2 27 04/21/2021    CO2 28 09/15/2020    BUN 21 04/21/2021    BUN 21 09/15/2020    CREATININE 1.1 04/21/2021    CREATININE 1.0 09/15/2020     CMP:    Lab Results   Component Value Date     04/21/2021     09/15/2020    K 5.1 04/21/2021    K 4.9 09/15/2020     04/21/2021     09/15/2020    CO2 27 04/21/2021    CO2 28 09/15/2020    BUN 21 04/21/2021    BUN 21 09/15/2020    CREATININE 1.1 04/21/2021    CREATININE 1.0 09/15/2020    PROT 7.3 04/21/2021    PROT 7.2 09/15/2020    PROT 7.5 01/02/2013    PROT 7.3 06/15/2012     TSH:    Lab Results   Component Value Date    TSH 3.36 03/26/2019    TSH 4.46 10/03/2018    TSHHS 2.676 11/03/2010       Echo: Last in 2014  Stress Cardiolyte: Last in 2014  EKG: normal sinus rhythm, LAFB     QUALITY MEASURES REVIEWED:  1.CAD:Patient is taking anti platelet agent:Yes  Patient does not have Hx of documented CAD  2. DYSLIPIDEMIA: Patient is on cholesterol lowering medication:Yes   3. Beta-Blocker therapy for CAD, if prior Myocardial Infarction:No   4. Counselled regarding smoking cessation. Yes   5. anticoagulation therapy (for A.Fib) No   Does Not have A.Fib.  6.Discussed weight management strategies. Assessment, Recommendations & Tests:     1. CHEST PAIN: Symptoms are quite typical suggesting crescendo angina. Will forego stress testing & arrange for heart cath possible PCI. DISCUSSED WITH PATIENT & HE IS IN FULL AGREEMENT. Will check Echo prior to heart Cath. 2. Dyslipidemia: CPM for now  3. DM: per PMD  4. Tobacco abuse: counceled. Office Visit after the Cath.      Bere Rich MD, 7/23/2021 11:45 AM

## 2021-07-23 NOTE — TELEPHONE ENCOUNTER
Pt called and wants to know if echo is to be done before cath on 7/29. Echo pre Donnis Certain is pending.

## 2021-07-23 NOTE — LETTER
[facility]  [address] [address]  Phone: [phone]  Fax: [phone]    Bere Rich MD    July 23, 2021     Flavio Hatch MD  [place] [id number]    Patient: Brayan Becerra   MR Number: [id number]   YOB: 1940   Date of Visit: 7/23/2021       Dear Flavio Hatch:    Thank you for referring Pablo Gunn to me for evaluation/treatment. Below are the relevant portions of my assessment and plan of care. If you have questions, please do not hesitate to call me. I look forward to following Eduardo Lyles along with you.     Sincerely,        Bere Rich MD

## 2021-07-23 NOTE — LETTER
Tamara Wilkins Rank  1940  [id number]    Have you had any Chest Pain that is not new? - Yes  If Yes DO EKG - How does it feel - Tightness   How long does the pain last - 2 seconds    How long have you been having the pain - Months     Have you had any Shortness of Breath - Yes  If Yes - When on exertion    Have you had any dizziness - Yes  If Yes DO ORTHOSTATIC BP - when do you feel dizzy with exertion     Have you had any palpitations that are not new? - No    Is the patient on any of the following medications -   If Yes DO EKG - Needs done every 6 months    Do you have any edema - swelling in No      When did you have your last labs drawn   Where did you have them done   What doctor ordered     If we do not have these labs you are retrieve these labs for these providers!     Do you have a surgery or procedure scheduled in the near future - No     Ask patient if they want to sign up for MyChart if they are not already signed up     Check to see if we have an E-MAIL on file for the patient     Check medication list thoroughly!!! AND RECONCILE OUTSIDE MEDICATIONS  If dose has changed change the entire order not just the MG  BE SURE TO ASK PATIENT IF THEY NEED MEDICATION REFILLS     At check out add to every patient's \"wrap up\" the following dot phrase AFTERHOURSEDUCATION and ensure we explain this to our patients

## 2021-07-26 ENCOUNTER — PROCEDURE VISIT (OUTPATIENT)
Dept: CARDIOLOGY CLINIC | Age: 81
End: 2021-07-26
Payer: MEDICARE

## 2021-07-26 DIAGNOSIS — R07.2 PRECORDIAL PAIN: ICD-10-CM

## 2021-07-26 DIAGNOSIS — E78.2 MIXED HYPERLIPIDEMIA: ICD-10-CM

## 2021-07-26 DIAGNOSIS — R06.09 DYSPNEA ON EXERTION: ICD-10-CM

## 2021-07-26 DIAGNOSIS — E11.9 TYPE 2 DIABETES MELLITUS WITHOUT COMPLICATION, WITHOUT LONG-TERM CURRENT USE OF INSULIN (HCC): ICD-10-CM

## 2021-07-26 LAB
LV EF: 53 %
LVEF MODALITY: NORMAL

## 2021-07-26 PROCEDURE — 93306 TTE W/DOPPLER COMPLETE: CPT | Performed by: INTERNAL MEDICINE

## 2021-07-27 ENCOUNTER — TELEPHONE (OUTPATIENT)
Dept: CARDIOLOGY CLINIC | Age: 81
End: 2021-07-27

## 2021-07-27 ENCOUNTER — HOSPITAL ENCOUNTER (OUTPATIENT)
Dept: GENERAL RADIOLOGY | Age: 81
Discharge: HOME | End: 2021-07-27
Payer: MEDICARE

## 2021-07-27 ENCOUNTER — HOSPITAL ENCOUNTER (OUTPATIENT)
Age: 81
Discharge: HOME | End: 2021-07-27
Payer: MEDICARE

## 2021-07-27 DIAGNOSIS — Z01.810 PRE-OPERATIVE CARDIOVASCULAR EXAMINATION: ICD-10-CM

## 2021-07-27 LAB
ABO/RH: NORMAL
ANION GAP SERPL CALCULATED.3IONS-SCNC: 10 MMOL/L (ref 4–16)
ANTIBODY SCREEN: NEGATIVE
APTT: 26 SECONDS (ref 25.1–37.1)
BASOPHILS ABSOLUTE: 0 K/CU MM
BASOPHILS RELATIVE PERCENT: 0.5 % (ref 0–1)
BUN BLDV-MCNC: 19 MG/DL (ref 6–23)
CALCIUM SERPL-MCNC: 9.9 MG/DL (ref 8.3–10.6)
CHLORIDE BLD-SCNC: 104 MMOL/L (ref 99–110)
CO2: 28 MMOL/L (ref 21–32)
COMMENT: NORMAL
CREAT SERPL-MCNC: 1 MG/DL (ref 0.9–1.3)
DIFFERENTIAL TYPE: ABNORMAL
EOSINOPHILS ABSOLUTE: 0.4 K/CU MM
EOSINOPHILS RELATIVE PERCENT: 4.4 % (ref 0–3)
GFR AFRICAN AMERICAN: >60 ML/MIN/1.73M2
GFR NON-AFRICAN AMERICAN: >60 ML/MIN/1.73M2
GLUCOSE BLD-MCNC: 104 MG/DL (ref 70–99)
HCT VFR BLD CALC: 44.8 % (ref 42–52)
HEMOGLOBIN: 14.4 GM/DL (ref 13.5–18)
IMMATURE NEUTROPHIL %: 0.2 % (ref 0–0.43)
INR BLD: 0.9 INDEX
LYMPHOCYTES ABSOLUTE: 2.6 K/CU MM
LYMPHOCYTES RELATIVE PERCENT: 30.7 % (ref 24–44)
MCH RBC QN AUTO: 31.9 PG (ref 27–31)
MCHC RBC AUTO-ENTMCNC: 32.1 % (ref 32–36)
MCV RBC AUTO: 99.1 FL (ref 78–100)
MONOCYTES ABSOLUTE: 0.7 K/CU MM
MONOCYTES RELATIVE PERCENT: 8.6 % (ref 0–4)
NUCLEATED RBC %: 0 %
PDW BLD-RTO: 13.2 % (ref 11.7–14.9)
PLATELET # BLD: 307 K/CU MM (ref 140–440)
PMV BLD AUTO: 10.3 FL (ref 7.5–11.1)
POTASSIUM SERPL-SCNC: 5.3 MMOL/L (ref 3.5–5.1)
PROTHROMBIN TIME: 11.6 SECONDS (ref 11.7–14.5)
RBC # BLD: 4.52 M/CU MM (ref 4.6–6.2)
SEGMENTED NEUTROPHILS ABSOLUTE COUNT: 4.6 K/CU MM
SEGMENTED NEUTROPHILS RELATIVE PERCENT: 55.6 % (ref 36–66)
SODIUM BLD-SCNC: 142 MMOL/L (ref 135–145)
TOTAL IMMATURE NEUTOROPHIL: 0.02 K/CU MM
TOTAL NUCLEATED RBC: 0 K/CU MM
WBC # BLD: 8.4 K/CU MM (ref 4–10.5)

## 2021-07-27 PROCEDURE — 86900 BLOOD TYPING SEROLOGIC ABO: CPT

## 2021-07-27 PROCEDURE — 86901 BLOOD TYPING SEROLOGIC RH(D): CPT

## 2021-07-27 PROCEDURE — 80048 BASIC METABOLIC PNL TOTAL CA: CPT

## 2021-07-27 PROCEDURE — 85025 COMPLETE CBC W/AUTO DIFF WBC: CPT

## 2021-07-27 PROCEDURE — 86850 RBC ANTIBODY SCREEN: CPT

## 2021-07-27 PROCEDURE — 85610 PROTHROMBIN TIME: CPT

## 2021-07-27 PROCEDURE — 36415 COLL VENOUS BLD VENIPUNCTURE: CPT

## 2021-07-27 PROCEDURE — 71046 X-RAY EXAM CHEST 2 VIEWS: CPT

## 2021-07-27 PROCEDURE — 85730 THROMBOPLASTIN TIME PARTIAL: CPT

## 2021-07-27 NOTE — TELEPHONE ENCOUNTER
Shazia wanted me to call and tell the patient that his posstium level is high. And ask him to drink water and back off bananas. I talked to the patient and he said yeah he been eating a lot of banana this week and he said he will back off and drink plenty of water.      Patient will need bmp redone tomorrow

## 2021-07-27 NOTE — H&P
HISTORY & PHYSICAL        CHIEF COMPLAINT: Chest Pain    HISTORY OF PRESENT ILLNESS:  Bard Waters is a [de-identified] y.o. male recently seen in office for C/O chest pains. Symptoms are quite typical suggesting crescendo angina. Will forego stress testing & arrange for heart cath possible PCI. Yung Yu has the following history recorded in [facility]:  Patient Active Problem List    Diagnosis Date Noted    Precordial pain 07/23/2021    Vertigo 04/21/2021    Allergic rhinitis 04/21/2021    Need for Tdap vaccination 04/21/2021    Insomnia 07/02/2020    Shingles 10/25/2017    Giant cell arteritis ([facility] 10/17/2017    History of 24 hour EKG monitoring 08/19/2014    Anxiety     Dyspnea 08/01/2014    Type 2 diabetes mellitus ([facility] 12/04/2011    Hyperlipidemia 12/04/2011    Diverticulosis 12/04/2011    Hx of adenomatous colonic polyps 12/04/2011    Family history of prostate cancer 12/04/2011     Current Outpatient Medications   Medication Sig Dispense Refill    methylPREDNISolone (MEDROL) 32 MG tablet Take one tablet 12 hours before the procedure.   Take one tablet 2 hours before the procedure 2 tablet 0    nicotine (NICODERM CQ) 14 MG/24HR Place 1 patch onto the skin daily 30 patch 2    montelukast (SINGULAIR) 10 MG tablet Take 1 tablet by mouth daily 30 tablet 3    fluticasone (FLONASE) 50 MCG/ACT nasal spray 2 sprays by Each Nostril route daily 1 Bottle 3    meclizine (ANTIVERT) 12.5 MG tablet Take 1 tablet by mouth 3 times daily as needed for Dizziness 30 tablet 1    cetirizine (ZYRTEC) 5 MG tablet Take 1 tablet by mouth daily as needed for Allergies or Rhinitis 30 tablet 3    metFORMIN (GLUCOPHAGE-XR) 500 MG extended release tablet TAKE 1 TABLET BY MOUTH ONCE DAILY IN THE EVENING WITH DINNER 90 tablet 3    atorvastatin (LIPITOR) 20 MG tablet Take 1 tablet by mouth every day 90 tablet 3    sodium polystyrene (SPS) 15 GM/60ML suspension Take 60 mLs by mouth once for 1 dose (Patient not taking: Reported on 7/21/2021) 1 Bottle 0    aspirin 81 MG tablet Take 81 mg by mouth daily.  Cholecalciferol (VITAMIN D3) 2000 UNIT CAPS Take  by mouth daily.  Coenzyme Q-10 50 MG TABS Take  by mouth daily.  calcium-vitamin D (OSCAL-500) 500-200 MG-UNIT per tablet Take 1 tablet by mouth 2 times daily. No current facility-administered medications for this encounter.      Allergies: Buspar [buspirone hcl], Codeine, and Shrimp flavor  Past Medical History:   Diagnosis Date    Anxiety     Bradycardia     Cancer ([facility] 07/2001    basal cell CA right preauricle    Colonic polyps     adenoma    Diabetes mellitus ([facility]     Diverticulosis     scattered    Dyspnea 8/2014    Hyperlipidemia     Internal hemorrhoids 1/2012    Tubular adenoma of colon 1/2012    sigmoid     Past Surgical History:   Procedure Laterality Date    COLONOSCOPY  2012    polypodectomy     HEMICOLECTOMY  5/06    right    INGUINAL HERNIA REPAIR      MOHS SURGERY      resection     Family History   Problem Relation Age of Onset    Heart Disease Mother     Cancer Father     Cancer Brother      Social History     Tobacco Use    Smoking status: Current Every Day Smoker     Packs/day: 0.25     Years: 63.00     Pack years: 15.75     Types: Cigarettes     Start date: 1/1/1955    Smokeless tobacco: Never Used   Substance Use Topics    Alcohol use: No     Comment: stopped alcohol in 1991, drinks 3 to 4 cups of coffee per day      Review of systems:  HEENT: Neg  Card:Chest pain   GI;Neg  : Neg  Neuro: Neg  Psych: Neg  Derm: Neg  MS; Neg  All: Documented  Constitutional: Neg    Objective:    /60   Pulse 59   Ht 5' 10\" (1.778 m)   Wt 213 lb (96.6 kg)   BMI 30.56 kg/m²      Wt Readings from Last 3 Encounters:   07/23/21 213 lb (96.6 kg)   07/21/21 212 lb (96.2 kg)   04/21/21 215 lb 6.4 oz (97.7 kg)     GENERAL - Alert, oriented, pleasant, in no apparent distress. HEENT - Unremarkable. Neck - Supple. No jugular venous distention noted. No carotid bruits. Cardiovascular - Normal S1 and S2 without obvious murmur or gallop. Extremities - No cyanosis, clubbing, or significant edema. Pulmonary - No respiratory distress. No wheezes or rales. Abdomen - No masses, tenderness, or organomegaly. Musculoskeletal - No significant edema. Neurologic - Cranial nerves II through XII are grossly intact. There were no gross focal neurologic abnormalities. Lab Review   Lab Results   Component Value Date    CKTOTAL 85 10/03/2019     BNP:  No results found for: BNP  PT/INR:  No results found for: PTINR  Lab Results   Component Value Date    LABA1C 5.7 04/21/2021    LABA1C 5.9 09/15/2020     Lab Results   Component Value Date    CHOL 169 09/15/2020    TRIG 197 (H) 09/15/2020    HDL 49 04/21/2021    LDLCALC 79 04/21/2021    LDLDIRECT 80 09/15/2020     Lab Results   Component Value Date    ALT 13 04/21/2021    AST 21 04/21/2021     BMP:    Lab Results   Component Value Date     04/21/2021    K 5.1 04/21/2021     04/21/2021    CO2 27 04/21/2021    BUN 21 04/21/2021     CMP:   Lab Results   Component Value Date     04/21/2021    K 5.1 04/21/2021     04/21/2021    CO2 27 04/21/2021    BUN 21 04/21/2021    PROT 7.3 04/21/2021    PROT 7.5 01/02/2013     TSH:    Lab Results   Component Value Date    TSH 3.36 03/26/2019         Impression:    Patient Active Problem List   Diagnosis    Type 2 diabetes mellitus ([facility]    Hyperlipidemia    Diverticulosis    Hx of adenomatous colonic polyps    Family history of prostate cancer    Anxiety    Dyspnea    History of 24 hour EKG monitoring    Giant cell arteritis ([facility])    Shingles    Insomnia    Vertigo    Allergic rhinitis    Need for Tdap vaccination    Precordial pain     Plan:  Left Heart Cath possible PCI. Informed consent obtained.   ASA & Mallampati 2:2  Further recommendations to be based on Cath findings.

## 2021-07-28 ENCOUNTER — HOSPITAL ENCOUNTER (OUTPATIENT)
Dept: CARDIAC CATH/INVASIVE PROCEDURES | Age: 81
Discharge: HOME | End: 2021-07-28
Attending: INTERNAL MEDICINE | Admitting: INTERNAL MEDICINE
Payer: MEDICARE

## 2021-07-28 VITALS
OXYGEN SATURATION: 98 % | DIASTOLIC BLOOD PRESSURE: 88 MMHG | HEIGHT: 70 IN | TEMPERATURE: 97.6 F | WEIGHT: 214 LBS | RESPIRATION RATE: 15 BRPM | SYSTOLIC BLOOD PRESSURE: 117 MMHG | BODY MASS INDEX: 30.64 KG/M2 | HEART RATE: 50 BPM

## 2021-07-28 LAB
ANION GAP SERPL CALCULATED.3IONS-SCNC: 11 MMOL/L (ref 4–16)
BUN BLDV-MCNC: 22 MG/DL (ref 6–23)
CALCIUM SERPL-MCNC: 9.5 MG/DL (ref 8.3–10.6)
CHLORIDE BLD-SCNC: 105 MMOL/L (ref 99–110)
CO2: 27 MMOL/L (ref 21–32)
CREAT SERPL-MCNC: 1 MG/DL (ref 0.9–1.3)
GFR AFRICAN AMERICAN: >60 ML/MIN/1.73M2
GFR NON-AFRICAN AMERICAN: >60 ML/MIN/1.73M2
GLUCOSE BLD-MCNC: 104 MG/DL (ref 70–99)
LV EF: 53 %
LVEF MODALITY: NORMAL
POTASSIUM SERPL-SCNC: 4.4 MMOL/L (ref 3.5–5.1)
SODIUM BLD-SCNC: 143 MMOL/L (ref 135–145)

## 2021-07-28 PROCEDURE — 2580000003 HC RX 258: Performed by: INTERNAL MEDICINE

## 2021-07-28 PROCEDURE — 6360000002 HC RX W HCPCS

## 2021-07-28 PROCEDURE — 93458 L HRT ARTERY/VENTRICLE ANGIO: CPT | Performed by: INTERNAL MEDICINE

## 2021-07-28 PROCEDURE — 6360000004 HC RX CONTRAST MEDICATION

## 2021-07-28 PROCEDURE — 2709999900 HC NON-CHARGEABLE SUPPLY

## 2021-07-28 PROCEDURE — C1894 INTRO/SHEATH, NON-LASER: HCPCS

## 2021-07-28 PROCEDURE — 6370000000 HC RX 637 (ALT 250 FOR IP): Performed by: INTERNAL MEDICINE

## 2021-07-28 PROCEDURE — 93458 L HRT ARTERY/VENTRICLE ANGIO: CPT

## 2021-07-28 PROCEDURE — 80048 BASIC METABOLIC PNL TOTAL CA: CPT

## 2021-07-28 PROCEDURE — 2500000003 HC RX 250 WO HCPCS

## 2021-07-28 RX ORDER — ACETAMINOPHEN 325 MG/1
650 TABLET ORAL EVERY 4 HOURS PRN
Status: DISCONTINUED | OUTPATIENT
Start: 2021-07-28 | End: 2021-07-28 | Stop reason: HOSPADM

## 2021-07-28 RX ORDER — DIPHENHYDRAMINE HCL 25 MG
25 TABLET ORAL
Status: COMPLETED | OUTPATIENT
Start: 2021-07-28 | End: 2021-07-28

## 2021-07-28 RX ORDER — SODIUM CHLORIDE 9 MG/ML
INJECTION, SOLUTION INTRAVENOUS CONTINUOUS
Status: DISCONTINUED | OUTPATIENT
Start: 2021-07-28 | End: 2021-07-28 | Stop reason: ALTCHOICE

## 2021-07-28 RX ORDER — DIAZEPAM 5 MG/1
5 TABLET ORAL
Status: COMPLETED | OUTPATIENT
Start: 2021-07-28 | End: 2021-07-28

## 2021-07-28 RX ORDER — SODIUM CHLORIDE 9 MG/ML
INJECTION, SOLUTION INTRAVENOUS CONTINUOUS
Status: DISCONTINUED | OUTPATIENT
Start: 2021-07-28 | End: 2021-07-28 | Stop reason: HOSPADM

## 2021-07-28 RX ORDER — SODIUM CHLORIDE 9 MG/ML
25 INJECTION, SOLUTION INTRAVENOUS PRN
Status: DISCONTINUED | OUTPATIENT
Start: 2021-07-28 | End: 2021-07-28 | Stop reason: HOSPADM

## 2021-07-28 RX ORDER — SODIUM CHLORIDE 0.9 % (FLUSH) 0.9 %
5-40 SYRINGE (ML) INJECTION EVERY 12 HOURS SCHEDULED
Status: DISCONTINUED | OUTPATIENT
Start: 2021-07-28 | End: 2021-07-28 | Stop reason: HOSPADM

## 2021-07-28 RX ORDER — METFORMIN HYDROCHLORIDE 500 MG/1
TABLET, EXTENDED RELEASE ORAL
Qty: 90 TABLET | Refills: 3 | OUTPATIENT
Start: 2021-07-28

## 2021-07-28 RX ORDER — SODIUM CHLORIDE 0.9 % (FLUSH) 0.9 %
5-40 SYRINGE (ML) INJECTION PRN
Status: DISCONTINUED | OUTPATIENT
Start: 2021-07-28 | End: 2021-07-28 | Stop reason: HOSPADM

## 2021-07-28 RX ADMIN — SODIUM CHLORIDE: 9 INJECTION, SOLUTION INTRAVENOUS at 06:14

## 2021-07-28 RX ADMIN — DIAZEPAM 5 MG: 5 TABLET ORAL at 06:14

## 2021-07-28 RX ADMIN — DIPHENHYDRAMINE HYDROCHLORIDE 25 MG: 25 TABLET ORAL at 06:15

## 2021-07-28 NOTE — PROGRESS NOTES
Discharge instructions reviewed with patient. Voices understanding. Ambulated without difficulty. Pt discharged to home per order. Transported to car per wheelchair.

## 2021-08-06 ENCOUNTER — OFFICE VISIT (OUTPATIENT)
Dept: CARDIOLOGY CLINIC | Age: 81
End: 2021-08-06
Payer: MEDICARE

## 2021-08-06 VITALS
HEART RATE: 60 BPM | DIASTOLIC BLOOD PRESSURE: 70 MMHG | BODY MASS INDEX: 31.15 KG/M2 | WEIGHT: 217.6 LBS | SYSTOLIC BLOOD PRESSURE: 130 MMHG | HEIGHT: 70 IN

## 2021-08-06 DIAGNOSIS — E11.9 TYPE 2 DIABETES MELLITUS WITHOUT COMPLICATION, WITHOUT LONG-TERM CURRENT USE OF INSULIN (HCC): ICD-10-CM

## 2021-08-06 DIAGNOSIS — I25.119 CORONARY ARTERY DISEASE INVOLVING NATIVE CORONARY ARTERY OF NATIVE HEART WITH ANGINA PECTORIS (HCC): Primary | ICD-10-CM

## 2021-08-06 DIAGNOSIS — R07.2 PRECORDIAL PAIN: ICD-10-CM

## 2021-08-06 DIAGNOSIS — R06.09 DYSPNEA ON EXERTION: ICD-10-CM

## 2021-08-06 DIAGNOSIS — E78.2 MIXED HYPERLIPIDEMIA: ICD-10-CM

## 2021-08-06 PROCEDURE — 99214 OFFICE O/P EST MOD 30 MIN: CPT | Performed by: INTERNAL MEDICINE

## 2021-08-06 PROCEDURE — 4004F PT TOBACCO SCREEN RCVD TLK: CPT | Performed by: INTERNAL MEDICINE

## 2021-08-06 PROCEDURE — G8417 CALC BMI ABV UP PARAM F/U: HCPCS | Performed by: INTERNAL MEDICINE

## 2021-08-06 PROCEDURE — 1123F ACP DISCUSS/DSCN MKR DOCD: CPT | Performed by: INTERNAL MEDICINE

## 2021-08-06 PROCEDURE — G8427 DOCREV CUR MEDS BY ELIG CLIN: HCPCS | Performed by: INTERNAL MEDICINE

## 2021-08-06 PROCEDURE — 4040F PNEUMOC VAC/ADMIN/RCVD: CPT | Performed by: INTERNAL MEDICINE

## 2021-08-06 RX ORDER — ATORVASTATIN CALCIUM 40 MG/1
40 TABLET, FILM COATED ORAL DAILY
Qty: 30 TABLET | Refills: 5 | Status: SHIPPED | OUTPATIENT
Start: 2021-08-06 | End: 2022-01-03

## 2021-08-06 RX ORDER — ISOSORBIDE MONONITRATE 30 MG/1
30 TABLET, EXTENDED RELEASE ORAL DAILY
Qty: 30 TABLET | Refills: 5 | Status: SHIPPED | OUTPATIENT
Start: 2021-08-06 | End: 2022-01-03

## 2021-08-06 NOTE — PROGRESS NOTES
suspension Take 60 mLs by mouth once for 1 dose (Patient not taking: Reported on 7/21/2021) 1 Bottle 0     No current facility-administered medications for this visit. Allergies: Buspar [buspirone hcl], Codeine, and Shrimp flavor  Review of Systems:    Constitutional: Negative for diaphoresis and fatigue  Respiratory: Negative for shortness of breath  Cardiovascular: Negative for chest pain, dyspnea on exertion, claudication, edema, irregular heartbeat, murmur, palpitations or shortness of breath  Musculoskeletal: Negative for muscle pain, muscular weakness, negative for pain in arm and leg or swelling in foot and leg    Objective:  /70 (Site: Left Upper Arm, Position: Sitting, Cuff Size: Medium Adult)   Pulse 60   Ht 5' 10\" (1.778 m)   Wt 217 lb 9.6 oz (98.7 kg)   BMI 31.22 kg/m²   Wt Readings from Last 3 Encounters:   08/06/21 217 lb 9.6 oz (98.7 kg)   07/28/21 214 lb (97.1 kg)   07/23/21 213 lb (96.6 kg)     Body mass index is 31.22 kg/m². GENERAL - Alert, oriented, pleasant, in no apparent distress. EYES: No jaundice, no conjunctival pallor. Neck - Supple. No jugular venous distention noted. No carotid bruits. Cardiovascular - Normal S1 and S2 without obvious murmur or gallop. Extremities - No cyanosis, clubbing, or significant edema. Pulmonary - No respiratory distress. No wheezes or rales.       Lab Review   No results found for: TROPONINT  No results found for: BNP, PROBNP  Lab Results   Component Value Date    INR 0.90 07/27/2021     Lab Results   Component Value Date    LABA1C 5.7 04/21/2021    LABA1C 5.9 09/15/2020     Lab Results   Component Value Date    WBC 8.4 07/27/2021    WBC 8.2 04/21/2021    HCT 44.8 07/27/2021    HCT 40.8 04/21/2021    MCV 99.1 07/27/2021    MCV 95.1 04/21/2021     07/27/2021     04/21/2021     Lab Results   Component Value Date    CHOL 169 09/15/2020    CHOL 160 10/03/2019    TRIG 197 (H) 09/15/2020    TRIG 147 10/03/2019    HDL 49 04/21/2021    HDL 59 09/15/2020    LDLCALC 79 04/21/2021    LDLCALC 63 10/03/2019    LDLDIRECT 80 09/15/2020     Lab Results   Component Value Date    ALT 13 04/21/2021    ALT 15 09/15/2020    AST 21 04/21/2021    AST 24 09/15/2020     BMP:    Lab Results   Component Value Date     07/28/2021     07/27/2021    K 4.4 07/28/2021    K 5.3 07/27/2021     07/28/2021     07/27/2021    CO2 27 07/28/2021    CO2 28 07/27/2021    BUN 22 07/28/2021    BUN 19 07/27/2021    CREATININE 1.0 07/28/2021    CREATININE 1.0 07/27/2021     CMP:   Lab Results   Component Value Date     07/28/2021     07/27/2021    K 4.4 07/28/2021    K 5.3 07/27/2021     07/28/2021     07/27/2021    CO2 27 07/28/2021    CO2 28 07/27/2021    BUN 22 07/28/2021    BUN 19 07/27/2021    CREATININE 1.0 07/28/2021    CREATININE 1.0 07/27/2021    PROT 7.3 04/21/2021    PROT 7.2 09/15/2020    PROT 7.5 01/02/2013    PROT 7.3 06/15/2012     Lab Results   Component Value Date    TSH 3.36 03/26/2019    TSH 4.46 10/03/2018    TSHHS 2.676 11/03/2010     ECHO 7/26/2021   Left ventricular function is low normal, EF is estimated at 50-55%. Mild left ventricular hypertrophy. Normal diastolic filling pattern for age. Mildly dilated left atrium. Mild mitral and tricuspid regurgitation is present. RVSP is 28 mmHg. No evidence of pericardial effusion. CATH 7/28/2021   Most significant stenosis is in RCA, it is 50 to 60 % proximal   stenosis. Normal LV systolic function. LVEF is 50 to 55 %. QUALITY MEASURES REVIEWED:  1.CAD:Patient is taking anti platelet agent:Yes  2. DYSLIPIDEMIA: Patient is on cholesterol lowering medication:Yes   3. Beta-Blocker therapy for CAD, if prior Myocardial Infarction:No   Due to side-effect(s) / Bradycardia  4. Counselled regarding smoking cessation. Yes   Trying Nicotene patches  5. Anticoagulation therapy (for A.Fib) No   Does Not have A.Fib.  6.Discussed weight management

## 2021-08-06 NOTE — LETTER
[facility]  [address] [address]  Phone: [phone]  Fax: [phone]    Leighann Mendez MD    August 6, 2021     Bianca Foss MD  [facility] [phone]    Patient: Dipti Hope   MR Number: [id number]   YOB: 1940   Date of Visit: 8/6/2021       Dear Bianca Foss:    Thank you for referring Carmen Cramer to me for evaluation/treatment. Below are the relevant portions of my assessment and plan of care. If you have questions, please do not hesitate to call me. I look forward to following Nico Alonzo along with you.     Sincerely,        Leighann Mendez MD

## 2021-08-06 NOTE — PATIENT INSTRUCTIONS
CAD:Yes  clinically stable. Patient is on optimal medical regimen ( see medication list above )  Patient is currently  ?symptomatic from CAD. -changes in  treatment:   yes,   -Testing ordered:  no  [facility] classification: 3  [place] RISK SCORE:  [place] RISK SCORE:  HTN:No  DYSLIPIDEMIA: yes,   Patient's profile is at / near [employer],   Tolerating current medical regimen wellyes,   See most recent Lab values in Labs section above. Diabetes mellitis:yes,   BS under good control yes,   Hgb A1c avilable yes,   OTHER RELEVANT DIAGNOSIS:as noted in patient's active problem list:  TESTS ORDERED:   None this visit  All previously ordered tests reviewed. MEDICATIONS: Increase Lipitor to 40 mg daily & add Imdur 30 mg daily. Refer to Rehab. Office f/u in 3 months.

## 2021-10-01 DIAGNOSIS — F41.9 ANXIETY: ICD-10-CM

## 2021-10-01 DIAGNOSIS — G47.00 INSOMNIA, UNSPECIFIED TYPE: ICD-10-CM

## 2021-10-01 RX ORDER — LORAZEPAM 0.5 MG/1
0.5 TABLET ORAL NIGHTLY PRN
Qty: 10 TABLET | Refills: 1 | Status: SHIPPED | OUTPATIENT
Start: 2021-10-01 | End: 2022-03-29 | Stop reason: SDUPTHER

## 2021-10-12 ENCOUNTER — APPOINTMENT (OUTPATIENT)
Dept: GENERAL RADIOLOGY | Age: 81
End: 2021-10-12
Payer: MEDICARE

## 2021-10-12 VITALS
DIASTOLIC BLOOD PRESSURE: 66 MMHG | SYSTOLIC BLOOD PRESSURE: 118 MMHG | BODY MASS INDEX: 30.78 KG/M2 | HEART RATE: 98 BPM | WEIGHT: 215 LBS | OXYGEN SATURATION: 94 % | RESPIRATION RATE: 17 BRPM | TEMPERATURE: 98.3 F | HEIGHT: 70 IN

## 2021-10-12 PROCEDURE — 99283 EMERGENCY DEPT VISIT LOW MDM: CPT

## 2021-10-12 PROCEDURE — 71046 X-RAY EXAM CHEST 2 VIEWS: CPT

## 2021-10-13 ENCOUNTER — HOSPITAL ENCOUNTER (EMERGENCY)
Age: 81
Discharge: HOME | End: 2021-10-13
Payer: MEDICARE

## 2021-10-13 DIAGNOSIS — R09.81 NASAL CONGESTION: ICD-10-CM

## 2021-10-13 DIAGNOSIS — R05.9 COUGH: Primary | ICD-10-CM

## 2021-10-13 LAB
SARS-COV-2, NAAT: DETECTED
SOURCE: ABNORMAL

## 2021-10-13 PROCEDURE — 87635 SARS-COV-2 COVID-19 AMP PRB: CPT

## 2021-10-13 NOTE — ED PROVIDER NOTES
Surgical History:   Procedure Laterality Date    COLONOSCOPY  2012    polypodectomy     HEMICOLECTOMY  5/06    right    INGUINAL HERNIA REPAIR      MOHS SURGERY      resection       CURRENT MEDICATIONS    Current Outpatient Rx   Medication Sig Dispense Refill    LORazepam (ATIVAN) 0.5 MG tablet Take 1 tablet by mouth nightly as needed for Anxiety (insomnia) for up to 30 days. 10 tablet 1    atorvastatin (LIPITOR) 40 MG tablet Take 1 tablet by mouth daily Take 1 tablet by mouth every day 30 tablet 5    isosorbide mononitrate (IMDUR) 30 MG extended release tablet Take 1 tablet by mouth daily 30 tablet 5    methylPREDNISolone (MEDROL) 32 MG tablet Take one tablet 12 hours before the procedure. Take one tablet 2 hours before the procedure 2 tablet 0    nicotine (NICODERM CQ) 14 MG/24HR Place 1 patch onto the skin daily 30 patch 2    montelukast (SINGULAIR) 10 MG tablet Take 1 tablet by mouth daily 30 tablet 3    fluticasone (FLONASE) 50 MCG/ACT nasal spray 2 sprays by Each Nostril route daily 1 Bottle 3    meclizine (ANTIVERT) 12.5 MG tablet Take 1 tablet by mouth 3 times daily as needed for Dizziness 30 tablet 1    cetirizine (ZYRTEC) 5 MG tablet Take 1 tablet by mouth daily as needed for Allergies or Rhinitis 30 tablet 3    metFORMIN (GLUCOPHAGE-XR) 500 MG extended release tablet TAKE 1 TABLET BY MOUTH ONCE DAILY IN THE EVENING WITH DINNER 90 tablet 3    sodium polystyrene (SPS) 15 GM/60ML suspension Take 60 mLs by mouth once for 1 dose (Patient not taking: Reported on 7/21/2021) 1 Bottle 0    aspirin 81 MG tablet Take 81 mg by mouth daily.  Cholecalciferol (VITAMIN D3) 2000 UNIT CAPS Take  by mouth daily.  Coenzyme Q-10 50 MG TABS Take  by mouth daily.  calcium-vitamin D (OSCAL-500) 500-200 MG-UNIT per tablet Take 1 tablet by mouth 2 times daily.          ALLERGIES    Allergies   Allergen Reactions    Buspar [Buspirone Hcl]      Dizziness    Codeine     Shrimp Flavor Hives FAMILY AND SOCIAL HISTORY    Family History   Problem Relation Age of Onset    Heart Disease Mother     Cancer Father     Cancer Brother      Social History     Socioeconomic History    Marital status:      Spouse name: None    Number of children: None    Years of education: None    Highest education level: None   Occupational History    None   Tobacco Use    Smoking status: Current Every Day Smoker     Packs/day: 0.25     Years: 63.00     Pack years: 15.75     Types: Cigarettes     Start date: 1/1/1955    Smokeless tobacco: Never Used   Substance and Sexual Activity    Alcohol use: No     Comment: stopped alcohol in 1991, drinks 3 to 4 cups of coffee per day    Drug use: No    Sexual activity: None   Other Topics Concern    None   Social History Narrative    None     Social Determinants of Health     Financial Resource Strain: Low Risk     Difficulty of Paying Living Expenses: Not hard at all   Food Insecurity: No Food Insecurity    Worried About Running Out of Food in the Last Year: Never true    [place] of Food in the Last Year: Never true   Transportation Needs:     Lack of Transportation (Medical):      Lack of Transportation (Non-Medical):    Physical Activity:     Days of Exercise per Week:     Minutes of Exercise per Session:    Stress:     Feeling of Stress :    Social Connections:     Frequency of Communication with Friends and Family:     Frequency of Social Gatherings with Friends and Family:     Attends [employer] Services:     Active Member of Clubs or Organizations:     Attends Club or Organization Meetings:     Marital Status:    Intimate Partner Violence:     Fear of Current or Ex-Partner:     Emotionally Abused:     Physically Abused:     Sexually Abused:        PHYSICAL EXAM    VITAL SIGNS: /66   Pulse 98   Temp 98.3 °F (36.8 °C) (Oral)   Resp 17   Ht 5' 10\" (1.778 m)   Wt 215 lb (97.5 kg)   SpO2 94%   BMI 30.85 kg/m²   Constitutional:  Well developed, well nourished, no acute distress, non-toxic appearance   Eyes: Conjunctiva normal, sclera non-icteric  HEENT:     - Normocephalic, atraumatic   - PERRL, EOM intact. Conjunctiva normal    - External auditory canals clear  Neck:    - supple, no JVD     Respiratory:    Clear to auscultation in bilateral lung fields, no wheezes/rales/rhonchi. No retractions, no accessory muscle use  Cardiovascular:  Normal rate, normal rhythm   GI: No distention  Musculoskeletal:  No obvious deficits, no edema   Integument:  Skin pink, warm, dry, intact   Psychiatric: Normal mood, normal affect      RADIOLOGY/PROCEDURES    XR CHEST (2 VW)   Final Result   No radiographic evidence of acute pulmonary abnormality seen. Overall, no   significant changes from the prior study are identified. ED COURSE & MEDICAL DECISION MAKING       Vital signs and nursing notes reviewed during ED course. I have independently evaluated this patient. Supervising physician present in the Emergency Department, available for consultation, throughout entirety of  patient care. All pertinent Lab data and radiographic results reviewed with patient at bedside. Patient presents as above with nasal congestion and cough that began this morning. Chest x-ray ordered through triage and reveals no radiographic evidence of acute pulmonary abnormality seen. No significant changes from the prior study were seen per radiologist read. No confluent airspace opacity, no pleural effusion, no pneumothorax. COVID-19 test ordered but as patient declines treatment with Regeneron at this time will order PCR testing for COVID-19 as rapid test would not change current management of patient as if he is positive for COVID-19 I will continue supportive care measures at this time so PCR testing for sensitivity and specificity is preferred. Patient swabbed and discharged as PCR test will not result tonight.   However nursing staff performed a rapid test instead of PCR test and patient was discharged immediately after being swabbed. Patient's rapid COVID-19 test did come back positive after he was discharged home. Patient and I discussed prior to discharge that he needs to follow-up on COVID-19 test results via MyChart and he is comfortable with this plan. Patient and I also discussed prior to discharge that Regeneron can only be performed in the first 10 days of illness and he understands that should he test positive that I recommend Regeneron and he should follow up with his PCP regarding this. Patient at this time is declining Regeneron and reports that he would only want to receive it if his symptoms significantly worsened. Patient declines any prescriptions at this time. He reports he will continue to utilize over-the-counter cough medication. Patient and I discussed the need to quarantine and isolate until his COVID-19 test results and to be positive continue to quarantine and isolate per [facility] guidance. I have low clinical suspicion for ACS. Patient is comfortable with discharge at this time. Patient is nontoxic appearing. Vital signs stable. Patient is stable for outpatient management. The patient and/or the family were informed of the results of any tests/labs/imaging, the treatment plan, and time was allotted to answer questions. Diagnosis, disposition, and plan discussed in detail with patient who understands and agrees. Patient understands and agrees to follow up with PCP for recheck in the next 2-3 days. Patient understands and agrees to return to emergency department if symptoms worsen or any new symptoms develop- strict return precautions given. I did don/doff appropriate PPE (including N95 face mask, safety glasses, gloves), as recommended by the health facility/national standard best practice, during my bedside interactions with the patient.       Vitals:    10/12/21 1818 10/12/21 2335   BP: (!) 152/74 118/66   Pulse: 87 98 Resp: 18 17   Temp: 98.3 °F (36.8 °C)    TempSrc: Oral    SpO2: 98% 94%   Weight: 215 lb (97.5 kg)    Height: 5' 10\" (1.778 m)          Clinical  IMPRESSION    1. Cough    2. Nasal congestion         Disposition referral (if applicable):  [phone]    Call today  [address] in 2-3 days    [facility] Emergency Department  [facility] [phone]  [phone]  Go to   Return to ED if symptoms worsen or new symptoms      Disposition medications (if applicable):  Discharge Medication List as of 10/13/2021  1:17 AM              Comment: Please note this report has been produced using speech recognition software and may contain errors related to that system including errors in grammar, punctuation, and spelling, as well as words and phrases that may be inappropriate. If there are any questions or concerns please feel free to contact the dictating provider for clarification.            Philly Cabrera PA-C  10/13/21 4687

## 2021-10-13 NOTE — ED NOTES
COVID swab completed and sent to lab. Discharge instructions explained. Patient verbalized understanding and denies any other concerns or complaints at this time. Patient vital signs stable and no acute signs or symptoms of distress noted at discharge. Patient deemed clinically stable. Patient d/c home.           Veena Do, RN  10/13/21 0120

## 2021-10-14 ENCOUNTER — CARE COORDINATION (OUTPATIENT)
Dept: CARE COORDINATION | Age: 81
End: 2021-10-14

## 2021-10-14 NOTE — CARE COORDINATION
[facility] ED Follow Up Call    10/14/2021    Patient: Marco Goodwin Patient : 1940   MRN: <[id number]>  Reason for Admission:   COVID+  Discharge Date:  10/14/21    Patient contacted regarding COVID-19 diagnosis. Discussed COVID-19 related testing which was available at this time. Test results were positive. Patient informed of results, if available? Yes. Ambulatory Care Manager contacted the patient by telephone to perform post discharge assessment. Call within 2 business days of discharge: Yes. Verified name and  with patient as identifiers. Provided introduction to self, and explanation of the ACM role, and reason for call due to risk factors for infection and/or exposure to COVID-19. Symptoms reviewed with patient who verbalized the following symptoms: weakness, dry cough. Encouraged rest/ hydration; fluids to thin mucous. Instructed on COVID-19 zone management and s/s to report to MD.       Due to no new or worsening symptoms encounter was not routed to provider for escalation. Discussed follow-up appointments. If no appointment was previously scheduled, appointment scheduling offered: Yes. Patient declined ACM offer to schedule appointment. [facility] follow up appointment(s):   Future Appointments   Date Time Provider Edgardo Astudillo   10/21/2021  2:30 PM [facility]   2021  1:15 PM Ha Clifford MD [facility]     Non-[facility] follow up appointment(s): Education in r/ self care management. Non-face-to-face services provided:  Education of patient/family/caregiver/guardian to support self-management-1     Advance Care Planning:   Does patient have an Advance Directive:  not on file. Educated patient about risk for severe COVID-19 due to risk factors according to CDC guidelines. ACM reviewed discharge instructions, medical action plan and red flag symptoms with the patient who verbalized understanding.  Discussed COVID vaccination status: Yes. Education provided on COVID-19 vaccination as appropriate. Discussed exposure protocols and quarantine with CDC Guidelines. Patient was given an opportunity to verbalize any questions and concerns and agrees to contact ACM or health care provider for questions related to their healthcare. Reviewed and educated patient on any new and changed medications related to discharge diagnosis. Patient reports that he has all of his medications and is taking them as directed. Was patient discharged with a pulse oximeter? No.  Discussed discharge instructions and when to notify provider or seek emergency care. Patient denies any questions or concerns at this time. ACM provided contact information. Plan for follow-up call in 5-7 days based on severity of symptoms and risk factors.        Care Transitions ED Follow Up    Care Transitions Interventions

## 2021-10-21 ENCOUNTER — CARE COORDINATION (OUTPATIENT)
Dept: CARE COORDINATION | Age: 81
End: 2021-10-21

## 2021-10-21 NOTE — CARE COORDINATION
Patient resolved from the Care Transitions episode on 10/21/21. Discussed COVID-19 related testing which was available at this time. Test results were positive. Patient informed of results, if available? Yes    Patient/family has been provided the following resources and education related to COVID-19:                         Signs, symptoms and red flags related to COVID-19            [facility] exposure and quarantine guidelines            [facility] exposure contact - [phone]            Contact for their local Department of Health                 Patient currently reports that the following symptoms have improved:  fatigue. Encouraged continued rest/ hydration. Instructed on s/s to report to MD.       Patient confirms plan for MD follow up today. Encouraged patient to write down any questions he may have for his provider prior to appointment. No further outreach scheduled with this ACM. Episode of Care resolved. Patient has this ACM contact information if future needs arise.

## 2021-11-08 ENCOUNTER — OFFICE VISIT (OUTPATIENT)
Dept: INTERNAL MEDICINE CLINIC | Age: 81
End: 2021-11-08
Payer: MEDICARE

## 2021-11-08 VITALS
TEMPERATURE: 97 F | SYSTOLIC BLOOD PRESSURE: 130 MMHG | HEIGHT: 70 IN | WEIGHT: 202 LBS | OXYGEN SATURATION: 97 % | DIASTOLIC BLOOD PRESSURE: 70 MMHG | HEART RATE: 71 BPM | BODY MASS INDEX: 28.92 KG/M2

## 2021-11-08 DIAGNOSIS — Z86.16 HISTORY OF COVID-19: ICD-10-CM

## 2021-11-08 DIAGNOSIS — R42 VERTIGO: ICD-10-CM

## 2021-11-08 DIAGNOSIS — F41.9 ANXIETY: ICD-10-CM

## 2021-11-08 DIAGNOSIS — E55.9 VITAMIN D DEFICIENCY: ICD-10-CM

## 2021-11-08 DIAGNOSIS — J30.9 ALLERGIC RHINITIS, UNSPECIFIED SEASONALITY, UNSPECIFIED TRIGGER: ICD-10-CM

## 2021-11-08 DIAGNOSIS — E11.9 TYPE 2 DIABETES MELLITUS WITHOUT COMPLICATION, WITHOUT LONG-TERM CURRENT USE OF INSULIN (HCC): ICD-10-CM

## 2021-11-08 DIAGNOSIS — E78.2 MIXED HYPERLIPIDEMIA: ICD-10-CM

## 2021-11-08 DIAGNOSIS — I25.119 CORONARY ARTERY DISEASE INVOLVING NATIVE CORONARY ARTERY OF NATIVE HEART WITH ANGINA PECTORIS (HCC): Primary | ICD-10-CM

## 2021-11-08 DIAGNOSIS — R53.83 FATIGUE, UNSPECIFIED TYPE: ICD-10-CM

## 2021-11-08 PROBLEM — Z72.0 TOBACCO ABUSE: Status: ACTIVE | Noted: 2021-11-08

## 2021-11-08 PROBLEM — Z13.21 ENCOUNTER FOR VITAMIN DEFICIENCY SCREENING: Status: ACTIVE | Noted: 2021-04-21

## 2021-11-08 LAB
A/G RATIO: 1.3 (ref 1.1–2.2)
ALBUMIN SERPL-MCNC: 4.1 G/DL (ref 3.4–5)
ALP BLD-CCNC: 85 U/L (ref 40–129)
ALT SERPL-CCNC: 14 U/L (ref 10–40)
ANION GAP SERPL CALCULATED.3IONS-SCNC: 16 MMOL/L (ref 3–16)
AST SERPL-CCNC: 25 U/L (ref 15–37)
BASOPHILS ABSOLUTE: 0.1 K/UL (ref 0–0.2)
BASOPHILS RELATIVE PERCENT: 0.7 %
BILIRUB SERPL-MCNC: 0.7 MG/DL (ref 0–1)
BUN BLDV-MCNC: 19 MG/DL (ref 7–20)
CALCIUM SERPL-MCNC: 9.9 MG/DL (ref 8.3–10.6)
CHLORIDE BLD-SCNC: 99 MMOL/L (ref 99–110)
CHOLESTEROL, FASTING: 143 MG/DL (ref 0–199)
CHP ED QC CHECK: NORMAL
CO2: 24 MMOL/L (ref 21–32)
CREAT SERPL-MCNC: 1.1 MG/DL (ref 0.8–1.3)
EOSINOPHILS ABSOLUTE: 0.2 K/UL (ref 0–0.6)
EOSINOPHILS RELATIVE PERCENT: 2.7 %
GFR AFRICAN AMERICAN: >60
GFR NON-AFRICAN AMERICAN: >60
GLUCOSE BLD-MCNC: 115 MG/DL (ref 70–99)
GLUCOSE BLD-MCNC: 123 MG/DL
HCT VFR BLD CALC: 42.3 % (ref 40.5–52.5)
HDLC SERPL-MCNC: 51 MG/DL (ref 40–60)
HEMOGLOBIN: 14 G/DL (ref 13.5–17.5)
LDL CHOLESTEROL CALCULATED: 61 MG/DL
LYMPHOCYTES ABSOLUTE: 1.9 K/UL (ref 1–5.1)
LYMPHOCYTES RELATIVE PERCENT: 21.5 %
MCH RBC QN AUTO: 31.6 PG (ref 26–34)
MCHC RBC AUTO-ENTMCNC: 33 G/DL (ref 31–36)
MCV RBC AUTO: 95.6 FL (ref 80–100)
MONOCYTES ABSOLUTE: 0.8 K/UL (ref 0–1.3)
MONOCYTES RELATIVE PERCENT: 8.4 %
NEUTROPHILS ABSOLUTE: 6 K/UL (ref 1.7–7.7)
NEUTROPHILS RELATIVE PERCENT: 66.7 %
PDW BLD-RTO: 13.9 % (ref 12.4–15.4)
PLATELET # BLD: 307 K/UL (ref 135–450)
PMV BLD AUTO: 9.2 FL (ref 5–10.5)
POTASSIUM SERPL-SCNC: 5.5 MMOL/L (ref 3.5–5.1)
RBC # BLD: 4.43 M/UL (ref 4.2–5.9)
SODIUM BLD-SCNC: 139 MMOL/L (ref 136–145)
TOTAL PROTEIN: 7.3 G/DL (ref 6.4–8.2)
TRIGLYCERIDE, FASTING: 154 MG/DL (ref 0–150)
TSH SERPL DL<=0.05 MIU/L-ACNC: 4.62 UIU/ML (ref 0.27–4.2)
VITAMIN D 25-HYDROXY: 48.8 NG/ML
VLDLC SERPL CALC-MCNC: 31 MG/DL
WBC # BLD: 9 K/UL (ref 4–11)

## 2021-11-08 PROCEDURE — 4004F PT TOBACCO SCREEN RCVD TLK: CPT | Performed by: INTERNAL MEDICINE

## 2021-11-08 PROCEDURE — G8417 CALC BMI ABV UP PARAM F/U: HCPCS | Performed by: INTERNAL MEDICINE

## 2021-11-08 PROCEDURE — 99214 OFFICE O/P EST MOD 30 MIN: CPT | Performed by: INTERNAL MEDICINE

## 2021-11-08 PROCEDURE — 36415 COLL VENOUS BLD VENIPUNCTURE: CPT | Performed by: INTERNAL MEDICINE

## 2021-11-08 PROCEDURE — 82962 GLUCOSE BLOOD TEST: CPT | Performed by: INTERNAL MEDICINE

## 2021-11-08 PROCEDURE — G8427 DOCREV CUR MEDS BY ELIG CLIN: HCPCS | Performed by: INTERNAL MEDICINE

## 2021-11-08 PROCEDURE — 4040F PNEUMOC VAC/ADMIN/RCVD: CPT | Performed by: INTERNAL MEDICINE

## 2021-11-08 PROCEDURE — G8484 FLU IMMUNIZE NO ADMIN: HCPCS | Performed by: INTERNAL MEDICINE

## 2021-11-08 PROCEDURE — 1123F ACP DISCUSS/DSCN MKR DOCD: CPT | Performed by: INTERNAL MEDICINE

## 2021-11-08 NOTE — PROGRESS NOTES
Name: Juan Anguiano  [id number]  Age: 80 y.o. 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 male  is seen today for management of chronic medical problems and medications refills. Previous records reviewed . He was tested positive for Covid 19 on 10/13/2021. He stayed home for a few days and now improving slowly. He was very weak but improving slowly. Still very weak. Lost appetite and lost @ 13 lbs since then. He did see Dr. Linnea Mota, cardiologist and had echocardiogram on 7/26/2021 and cardiac cath on 7/28/2021. ECHO 7/26/2021   Left ventricular function is low normal, EF is estimated at 50-55%.   Mild left ventricular hypertrophy.   Normal diastolic filling pattern for age.  Saranya Elva dilated left atrium.   Mild mitral and tricuspid regurgitation is present.  RVSP is 28 mmHg.   No evidence of pericardial effusion.     CATH 7/28/2021   Most significant stenosis is in RCA, it is 50 to 60 % proximal   stenosis.   Normal LV systolic function. LVEF is 50 to 55 %. He did increase Lipitor to 40 mg daily and added Imdur 30 mg daily. No more chest pain since then      Dizziness is better. Uses meclizine as needed. Still with nasal congestion and chronic allergy symptoms. Not taking Zyrtec as they make him drowsy. Denies any abdominal pain. Appetite is poor since he had Covid 19 infection but not improving  Bowels moving Jetty Cueva. No urinary symptoms. Denies any significant arthritis. Hearing is somewhat poor  Vision Ok with glasses. Denies  any significant skin lesions. Denies any significant depression . Has anxiety and insomnia for which he uses a Ativan very rarely. 10 pills usually last for more than a month or so. Blood sugars ok but he does not check it often.   He claims he quit smoking recently . He has been fully vaccinated for COVID-19. As mentioned more recently he had COVID-19 infection also. He is going to get flu shot soon  No other complaints. Past Medical History:    Patient Active Problem List   Diagnosis    Type 2 diabetes mellitus ([facility] [address]    Hyperlipidemia    Diverticulosis    Hx of adenomatous colonic polyps    Family history of prostate cancer    Anxiety    Dyspnea    History of 24 hour EKG monitoring    Giant cell arteritis ([facility] [address]    Shingles    Insomnia    Vertigo    Allergic rhinitis    Encounter for vitamin deficiency screening    Need for Tdap vaccination    Precordial pain    Coronary artery disease involving native coronary artery of native heart with angina pectoris ([facility] [address]    Tobacco abuse    Fatigue    History of COVID-19    Vitamin D deficiency        Past Surgical History:        Procedure Laterality Date    COLONOSCOPY  2012    polypodectomy     HEMICOLECTOMY  5/06    right    INGUINAL HERNIA REPAIR      MOHS SURGERY      resection       Social History:   Social History     Tobacco Use    Smoking status: Current Every Day Smoker     Packs/day: 0.25     Years: 63.00     Pack years: 15.75     Types: Cigarettes     Start date: 1/1/1955    Smokeless tobacco: Never Used   Substance Use Topics    Alcohol use: No     Comment: stopped alcohol in 1991, drinks 3 to 4 cups of coffee per day       Family History:       Problem Relation Age of Onset    Heart Disease Mother     Cancer Father     Cancer Brother        Allergies:  Buspar [buspirone hcl], Codeine, and Shrimp flavor    Current Medications :      Prior to Admission medications    Medication Sig Start Date End Date Taking?  Authorizing Provider   atorvastatin (LIPITOR) 40 MG tablet Take 1 tablet by mouth daily Take 1 tablet by mouth every day 8/6/21  Yes Stephanie De La Cruz MD   isosorbide mononitrate (IMDUR) 30 MG extended release tablet Take 1 tablet by mouth daily 8/6/21  Yes Fito MD Shazia   montelukast (SINGULAIR) 10 MG tablet Take 1 tablet by mouth daily 7/21/21  Yes Tom Deluca MD   fluticasone [facility]) 50 MCG/ACT nasal spray 2 sprays by Each Nostril route daily 7/21/21  Yes Tom Deluca MD   meclizine (ANTIVERT) 12.5 MG tablet Take 1 tablet by mouth 3 times daily as needed for Dizziness 4/21/21  Yes Tom Deluca MD   cetirizine (ZYRTEC) 5 MG tablet Take 1 tablet by mouth daily as needed for Allergies or Rhinitis 4/21/21  Yes Tom Deluca MD   metFORMIN (GLUCOPHAGE-XR) 500 MG extended release tablet TAKE 1 TABLET BY MOUTH ONCE DAILY IN THE EVENING WITH DINNER 1/21/21  Yes Rakesh Vitale MD   aspirin 81 MG tablet Take 81 mg by mouth daily. Yes Historical Provider, MD   Cholecalciferol (VITAMIN D3) 2000 UNIT CAPS Take  by mouth daily. Yes Historical Provider, MD   Coenzyme Q-10 50 MG TABS Take  by mouth daily. Yes Historical Provider, MD   calcium-vitamin D (OSCAL-500) 500-200 MG-UNIT per tablet Take 1 tablet by mouth 2 times daily. Yes Historical Provider, MD       LAB DATA: Reviewed. REVIEW OF SYSTEMS:   see HPI/ Comprehensive review of systems negative except for the ones mentioned in HPI. PHYSICAL EXAMINATION:   /70 (Site: Left Upper Arm, Position: Sitting, Cuff Size: Medium Adult)   Pulse 71   Temp 97 °F (36.1 °C)   Ht 5' 10\" (1.778 m)   Wt 202 lb (91.6 kg)   SpO2 97%   BMI 28.98 kg/m²      GENERAL APPEARANCE:    Alert, oriented x 3, well developed, cooperative, not in any distress, appears stated age. HEAD:   Normocephalic, atraumatic   EYES:   PERRLA, EOMI, lids normal, conjuctivea clear, sclera anicteric. NECK:    Supple, symmetrical,  trachea midline, no thyromegaly, no JVD, no lymphadenopathy. LUNGS:    Clear to auscultation bilaterally, respirations unlabored, accessory muscles are not used. HEART:     Regular rate and rhythm, S1 and S2 normal, no murmur, rub or gallop. PMI in MCL.   ABDOMEN:    Soft, non-tender, bowel sounds are normoactive, no masses, no hepatospleenomegaly. EXTREMITY:   no bipedal edema  NEURO:  Alert, oriented to person, place and time. Grossly intact. Musculoskeletal:         No kyphosis or scoliosis, no deformity in any extremity noted, muscle strength and tone are normal.  Skin:                            Warm and dry. No rash or obvious suspicious lesions. PSYCH:  Mood euthymic, insight and judgement good. ASSESSMENT/PLAN:    1. Coronary artery disease involving native coronary artery of native heart with angina pectoris (HCC)  Continue aspirin, Imdur, Lipitor. Advised to continue to follow with his cardiologist.    2. Type 2 diabetes mellitus without complication, without long-term current use of insulin ([facility] [address]  Patient on Glucophage but does not check sugars at home. Advised to check sugars at home and follow low sugar diet. - POCT Glucose  - Hemoglobin A1C    3. Mixed hyperlipidemia  Patient is taking cholesterol medications regularly. Denies any side effects. Diet and exercise advised. Continue Lipitor  - Lipid, Fasting    4. Allergic rhinitis, unspecified seasonality, unspecified trigger  Patient on Zyrtec and Flonase and Singulair    5. History of COVID-19  Improving slowly now. Advised to take vitamin C and vitamin D  And eat healthy and exercise. 6. Fatigue, unspecified type  Check labs. Patient takes multivitamin at home. - Comprehensive Metabolic Panel  - CBC Auto Differential  - TSH without Reflex    7. Anxiety  Patient not take Ativan anymore. 8. Vertigo  Improved. Meclizine as needed. 9. Vitamin D deficiency  Continue vitamin D3  - Vitamin D 25 Hydroxy    Advised to follow COVID-19 precautions    Care discussed with patient. Questions answered and patient verbalizes understanding and agrees with plan. Medications reviewed and reconciled. Continue current medications. Appropriate prescriptions are ordered. Risks and benefits of meds are discussed.      After visit summary provided. Advised to call for any problems, questions, or concerns. If symptoms worsen or don't improve as expected, to call us or go to ER. Follow up as directed, sooner if needed. Return in about 3 months (around 2/8/2022). This dictation was performed with a verbal recognition program and it was checked for errors. It is possible that there are still dictated errors within this office note. Any errors should be brought immediately to my attention for correction. All efforts were made to ensure that this office note is accurate.      Janette Campuzano MD MD

## 2021-11-09 DIAGNOSIS — E03.4 HYPOTHYROIDISM DUE TO ACQUIRED ATROPHY OF THYROID: Primary | ICD-10-CM

## 2021-11-09 LAB
ESTIMATED AVERAGE GLUCOSE: 122.6 MG/DL
HBA1C MFR BLD: 5.9 %

## 2021-11-09 RX ORDER — LEVOTHYROXINE SODIUM 0.05 MG/1
50 TABLET ORAL DAILY
Qty: 30 TABLET | Refills: 0 | Status: SHIPPED | OUTPATIENT
Start: 2021-11-09 | End: 2021-12-02 | Stop reason: SDUPTHER

## 2021-11-11 ENCOUNTER — OFFICE VISIT (OUTPATIENT)
Dept: CARDIOLOGY CLINIC | Age: 81
End: 2021-11-11
Payer: MEDICARE

## 2021-11-11 VITALS
DIASTOLIC BLOOD PRESSURE: 82 MMHG | BODY MASS INDEX: 28.67 KG/M2 | SYSTOLIC BLOOD PRESSURE: 130 MMHG | HEIGHT: 71 IN | HEART RATE: 70 BPM | WEIGHT: 204.8 LBS

## 2021-11-11 DIAGNOSIS — Z72.0 TOBACCO ABUSE: ICD-10-CM

## 2021-11-11 DIAGNOSIS — I25.119 CORONARY ARTERY DISEASE INVOLVING NATIVE CORONARY ARTERY OF NATIVE HEART WITH ANGINA PECTORIS (HCC): Primary | ICD-10-CM

## 2021-11-11 DIAGNOSIS — E11.9 TYPE 2 DIABETES MELLITUS WITHOUT COMPLICATION, WITHOUT LONG-TERM CURRENT USE OF INSULIN (HCC): ICD-10-CM

## 2021-11-11 DIAGNOSIS — E78.2 MIXED HYPERLIPIDEMIA: ICD-10-CM

## 2021-11-11 DIAGNOSIS — Z86.16 HISTORY OF COVID-19: ICD-10-CM

## 2021-11-11 PROCEDURE — G8484 FLU IMMUNIZE NO ADMIN: HCPCS | Performed by: INTERNAL MEDICINE

## 2021-11-11 PROCEDURE — G8427 DOCREV CUR MEDS BY ELIG CLIN: HCPCS | Performed by: INTERNAL MEDICINE

## 2021-11-11 PROCEDURE — 1123F ACP DISCUSS/DSCN MKR DOCD: CPT | Performed by: INTERNAL MEDICINE

## 2021-11-11 PROCEDURE — G8417 CALC BMI ABV UP PARAM F/U: HCPCS | Performed by: INTERNAL MEDICINE

## 2021-11-11 PROCEDURE — 99212 OFFICE O/P EST SF 10 MIN: CPT | Performed by: INTERNAL MEDICINE

## 2021-11-11 PROCEDURE — 4040F PNEUMOC VAC/ADMIN/RCVD: CPT | Performed by: INTERNAL MEDICINE

## 2021-11-11 PROCEDURE — 4004F PT TOBACCO SCREEN RCVD TLK: CPT | Performed by: INTERNAL MEDICINE

## 2021-11-11 NOTE — PATIENT INSTRUCTIONS
CORONARY ARTERY DISEASE:Yes  clinically stable. Patient is on optimal medical regimen ( see medication list above )  Patient is currently  ?symptomatic from CAD. -changes in  treatment:   yes,   -Testing ordered:  no  [facility] classification: 3  [place] RISK SCORE:  [place] RISK SCORE:  HTN:No History  DYSLIPIDEMIA: yes,   Patient's profile is at / near [employer],   Tolerating current medical regimen wellyes,   See most recent Lab values in Labs section above. Diabetes mellitis:yes,   BS under good control yes,   Hgb A1c avilable yes,     TESTS ORDERED: None this visit     PREVIOUSLY ORDERED TESTS REVIEWED & DISCUSSED WITH THE PATIENT:     I personally reviewed & interpreted, all previously ordered tests as copied above. Latest Labs are pulled in to the note with dates. Labs, specially in Reference to Lipid profile, Cardiac testing in the form of Echo, stress tests & other relevant cardiac testing reviewed with patient & recommendations made based on assessment of the results. MEDICATIONS: List of medications patient is currently taking is reviewed in detail with the patient. Discussed any side effects or problems taking the medication. Recommend Continue present management & medications as listed. AFFIRMATION: I spent at least 20 minutes of time reviewing patient's history, previous & current medical problems & all Labs + testing. This includes chart prep even prior to the vosit. Various goals are discussed and multiple questions answered. Relevant concelling performed. Office follow up in six months.

## 2021-11-11 NOTE — PROGRESS NOTES
OFFICE PROGRESS NOTES      Terry Olmedo is a 80 y.o. male who has    CHIEF COMPLAINT AS FOLLOWS:  CHEST PAIN: Patient denies any C/O chest pains at this time. SOB: No C/O SOB at this time. .   LEG EDEMA: No leg edema   PALPITATIONS: Denies any C/O Palpitations   DIZZINESS: No C/O Dizziness   SYNCOPE: None   OTHER: Reportedly had COVID middle of October. Still has congestion & cough. HPI: Patient is here for F/U on his CAD & Dyslipidemia problems. CAD: Patient has known CAD. Dyslipidemia: Patient has known mixed dyslipidemia. Has been treated with guideline recommended medical / physical/ diet therapy as stated below. Current Outpatient Medications   Medication Sig Dispense Refill    levothyroxine (SYNTHROID) 50 MCG tablet Take 1 tablet by mouth daily 30 tablet 0    atorvastatin (LIPITOR) 40 MG tablet Take 1 tablet by mouth daily Take 1 tablet by mouth every day 30 tablet 5    isosorbide mononitrate (IMDUR) 30 MG extended release tablet Take 1 tablet by mouth daily 30 tablet 5    montelukast (SINGULAIR) 10 MG tablet Take 1 tablet by mouth daily 30 tablet 3    fluticasone (FLONASE) 50 MCG/ACT nasal spray 2 sprays by Each Nostril route daily 1 Bottle 3    meclizine (ANTIVERT) 12.5 MG tablet Take 1 tablet by mouth 3 times daily as needed for Dizziness 30 tablet 1    cetirizine (ZYRTEC) 5 MG tablet Take 1 tablet by mouth daily as needed for Allergies or Rhinitis 30 tablet 3    metFORMIN (GLUCOPHAGE-XR) 500 MG extended release tablet TAKE 1 TABLET BY MOUTH ONCE DAILY IN THE EVENING WITH DINNER 90 tablet 3    aspirin 81 MG tablet Take 81 mg by mouth daily.  Cholecalciferol (VITAMIN D3) 2000 UNIT CAPS Take  by mouth daily.  Coenzyme Q-10 50 MG TABS Take  by mouth daily.  calcium-vitamin D (OSCAL-500) 500-200 MG-UNIT per tablet Take 1 tablet by mouth 2 times daily. No current facility-administered medications for this visit. Allergies: Buspar [buspirone hcl], Codeine, and Shrimp flavor  Review of Systems:    Constitutional: Negative for diaphoresis and fatigue  Respiratory: Negative for shortness of breath  Cardiovascular: Negative for chest pain, dyspnea on exertion, claudication, edema, irregular heartbeat, murmur, palpitations or shortness of breath  Musculoskeletal: Negative for muscle pain, muscular weakness, negative for pain in arm and leg or swelling in foot and leg    Objective:  /82   Pulse 70   Ht 5' 10.5\" (1.791 m)   Wt 204 lb 12.8 oz (92.9 kg)   BMI 28.97 kg/m²   Wt Readings from Last 3 Encounters:   11/11/21 204 lb 12.8 oz (92.9 kg)   11/08/21 202 lb (91.6 kg)   10/12/21 215 lb (97.5 kg)     Body mass index is 28.97 kg/m². GENERAL - Alert, oriented, pleasant, in no apparent distress. EYES: No jaundice, no conjunctival pallor. Neck - Supple. No jugular venous distention noted. No carotid bruits. Cardiovascular - Normal S1 and S2 without obvious murmur or gallop. Extremities - No cyanosis, clubbing, or significant edema. Pulmonary - No respiratory distress. No wheezes or rales.       MEDICAL DECISION MAKING & DATA REVIEW:    Lab Review   No results found for: TROPONINT  No results found for: BNP, PROBNP  Lab Results   Component Value Date    INR 0.90 07/27/2021     Lab Results   Component Value Date    LABA1C 5.9 11/08/2021    LABA1C 5.7 04/21/2021     Lab Results   Component Value Date    WBC 9.0 11/08/2021    WBC 8.4 07/27/2021    HCT 42.3 11/08/2021    HCT 44.8 07/27/2021    MCV 95.6 11/08/2021    MCV 99.1 07/27/2021     11/08/2021     07/27/2021     Lab Results   Component Value Date    CHOL 169 09/15/2020    CHOL 160 10/03/2019    TRIG 197 (H) 09/15/2020    TRIG 147 10/03/2019    HDL 51 11/08/2021    HDL 49 04/21/2021    LDLCALC 61 11/08/2021    LDLCALC 79 04/21/2021    LDLDIRECT 80 09/15/2020     Lab Results   Component Value Date    ALT 14 11/08/2021    ALT 13 04/21/2021    AST 25 11/08/2021    AST 21 04/21/2021     BMP:    Lab Results   Component Value Date     11/08/2021     07/28/2021    K 5.5 11/08/2021    K 4.4 07/28/2021    CL 99 11/08/2021     07/28/2021    CO2 24 11/08/2021    CO2 27 07/28/2021    BUN 19 11/08/2021    BUN 22 07/28/2021    CREATININE 1.1 11/08/2021    CREATININE 1.0 07/28/2021     CMP:   Lab Results   Component Value Date     11/08/2021     07/28/2021    K 5.5 11/08/2021    K 4.4 07/28/2021    CL 99 11/08/2021     07/28/2021    CO2 24 11/08/2021    CO2 27 07/28/2021    BUN 19 11/08/2021    BUN 22 07/28/2021    CREATININE 1.1 11/08/2021    CREATININE 1.0 07/28/2021    PROT 7.3 11/08/2021    PROT 7.3 04/21/2021    PROT 7.5 01/02/2013    PROT 7.3 06/15/2012     Lab Results   Component Value Date    TSH 4.62 11/08/2021    TSH 3.36 03/26/2019    TSHHS 2.676 11/03/2010      ECHO 7/26/2021   Left ventricular function is low normal, EF is estimated at 50-55%.  Zamora Buttery left ventricular hypertrophy.   Normal diastolic filling pattern for age.  Valjean Juba dilated left atrium.   Mild mitral and tricuspid regurgitation is present.  RVSP is 28 mmHg.   No evidence of pericardial effusion.     CATH 7/28/2021   Most significant stenosis is in RCA, it is 50 to 60 % proximal   stenosis.   Normal LV systolic function. LVEF is 50 to 55 %. QUALITY MEASURES REVIEWED:  1.CAD:Patient is taking anti platelet agent:Yes  2. DYSLIPIDEMIA: Patient is on cholesterol lowering medication:Yes   3. Beta-Blocker therapy for CAD, if prior Myocardial Infarction:No   4. Counselled regarding smoking cessation. Yes   Patient does not Smoke. 5.Anticoagulation therapy (for A.Fib) No   Does Not have A.Fib.  6.Discussed weight management strategies. Assessment & Plan:  Primary / Secondary prevention is the goal by aggressive risk modification, healthy and therapeutic life style changes for cardiovascular risk reduction. CORONARY ARTERY DISEASE:Yes  clinically stable. Patient is on optimal medical regimen ( see medication list above )  Patient is currently  ?symptomatic from CAD. -changes in  treatment:   yes,   -Testing ordered:  no  [facility] classification: 3  [place] RISK SCORE:  [place] RISK SCORE:  HTN:No History  DYSLIPIDEMIA: yes,   Patient's profile is at / near [employer],   Tolerating current medical regimen wellyes,   See most recent Lab values in Labs section above. Diabetes mellitis:yes,   BS under good control yes,   Hgb A1c avilable yes,     TESTS ORDERED: None this visit     PREVIOUSLY ORDERED TESTS REVIEWED & DISCUSSED WITH THE PATIENT:     I personally reviewed & interpreted, all previously ordered tests as copied above. Latest Labs are pulled in to the note with dates. Labs, specially in Reference to Lipid profile, Cardiac testing in the form of Echo, stress tests & other relevant cardiac testing reviewed with patient & recommendations made based on assessment of the results. MEDICATIONS: List of medications patient is currently taking is reviewed in detail with the patient. Discussed any side effects or problems taking the medication. Recommend Continue present management & medications as listed. AFFIRMATION: I spent at least 20 minutes of time reviewing patient's history, previous & current medical problems & all Labs + testing. This includes chart prep even prior to the vosit. Various goals are discussed and multiple questions answered. Relevant concelling performed. Office follow up in six months.

## 2021-11-12 RX ORDER — METFORMIN HYDROCHLORIDE 500 MG/1
TABLET, EXTENDED RELEASE ORAL
Qty: 90 TABLET | Refills: 3 | Status: SHIPPED | OUTPATIENT
Start: 2021-11-12 | End: 2022-05-26 | Stop reason: SDUPTHER

## 2021-12-02 ENCOUNTER — PATIENT MESSAGE (OUTPATIENT)
Dept: INTERNAL MEDICINE CLINIC | Age: 81
End: 2021-12-02

## 2021-12-02 DIAGNOSIS — E03.4 HYPOTHYROIDISM DUE TO ACQUIRED ATROPHY OF THYROID: ICD-10-CM

## 2021-12-02 RX ORDER — LEVOTHYROXINE SODIUM 0.05 MG/1
50 TABLET ORAL DAILY
Qty: 30 TABLET | Refills: 3 | Status: SHIPPED | OUTPATIENT
Start: 2021-12-02 | End: 2022-04-08 | Stop reason: SDUPTHER

## 2021-12-06 DIAGNOSIS — J30.9 ALLERGIC RHINITIS, UNSPECIFIED SEASONALITY, UNSPECIFIED TRIGGER: ICD-10-CM

## 2021-12-06 RX ORDER — MONTELUKAST SODIUM 10 MG/1
TABLET ORAL
Qty: 30 TABLET | Refills: 3 | Status: SHIPPED | OUTPATIENT
Start: 2021-12-06 | End: 2022-04-04

## 2021-12-08 PROBLEM — Z13.21 ENCOUNTER FOR VITAMIN DEFICIENCY SCREENING: Status: RESOLVED | Noted: 2021-04-21 | Resolved: 2021-12-08

## 2021-12-23 LAB — DIABETIC RETINOPATHY: NEGATIVE

## 2022-01-03 RX ORDER — ISOSORBIDE MONONITRATE 30 MG/1
TABLET, EXTENDED RELEASE ORAL
Qty: 30 TABLET | Refills: 5 | Status: SHIPPED | OUTPATIENT
Start: 2022-01-03 | End: 2022-06-29

## 2022-01-03 RX ORDER — ATORVASTATIN CALCIUM 40 MG/1
TABLET, FILM COATED ORAL
Qty: 30 TABLET | Refills: 5 | Status: SHIPPED | OUTPATIENT
Start: 2022-01-03 | End: 2022-05-23 | Stop reason: SDUPTHER

## 2022-02-09 ENCOUNTER — OFFICE VISIT (OUTPATIENT)
Dept: INTERNAL MEDICINE CLINIC | Age: 82
End: 2022-02-09
Payer: MEDICARE

## 2022-02-09 VITALS
HEART RATE: 88 BPM | TEMPERATURE: 97.1 F | BODY MASS INDEX: 28.98 KG/M2 | DIASTOLIC BLOOD PRESSURE: 80 MMHG | WEIGHT: 207 LBS | HEIGHT: 71 IN | SYSTOLIC BLOOD PRESSURE: 132 MMHG | OXYGEN SATURATION: 98 %

## 2022-02-09 DIAGNOSIS — Z23 NEED FOR INFLUENZA VACCINATION: ICD-10-CM

## 2022-02-09 DIAGNOSIS — Z23 NEED FOR PROPHYLACTIC VACCINATION AND INOCULATION AGAINST VARICELLA: ICD-10-CM

## 2022-02-09 DIAGNOSIS — Z00.00 ROUTINE GENERAL MEDICAL EXAMINATION AT A HEALTH CARE FACILITY: ICD-10-CM

## 2022-02-09 DIAGNOSIS — E11.9 TYPE 2 DIABETES MELLITUS WITHOUT COMPLICATION, WITHOUT LONG-TERM CURRENT USE OF INSULIN (HCC): Primary | ICD-10-CM

## 2022-02-09 LAB
CHP ED QC CHECK: NORMAL
GLUCOSE BLD-MCNC: 129 MG/DL

## 2022-02-09 PROCEDURE — G0008 ADMIN INFLUENZA VIRUS VAC: HCPCS | Performed by: INTERNAL MEDICINE

## 2022-02-09 PROCEDURE — 82962 GLUCOSE BLOOD TEST: CPT | Performed by: INTERNAL MEDICINE

## 2022-02-09 PROCEDURE — 4040F PNEUMOC VAC/ADMIN/RCVD: CPT | Performed by: INTERNAL MEDICINE

## 2022-02-09 PROCEDURE — 1123F ACP DISCUSS/DSCN MKR DOCD: CPT | Performed by: INTERNAL MEDICINE

## 2022-02-09 PROCEDURE — G0439 PPPS, SUBSEQ VISIT: HCPCS | Performed by: INTERNAL MEDICINE

## 2022-02-09 PROCEDURE — 90694 VACC AIIV4 NO PRSRV 0.5ML IM: CPT | Performed by: INTERNAL MEDICINE

## 2022-02-09 PROCEDURE — G8484 FLU IMMUNIZE NO ADMIN: HCPCS | Performed by: INTERNAL MEDICINE

## 2022-02-09 ASSESSMENT — PATIENT HEALTH QUESTIONNAIRE - PHQ9
1. LITTLE INTEREST OR PLEASURE IN DOING THINGS: 0
SUM OF ALL RESPONSES TO PHQ QUESTIONS 1-9: 0
2. FEELING DOWN, DEPRESSED OR HOPELESS: 0
SUM OF ALL RESPONSES TO PHQ QUESTIONS 1-9: 0
2. FEELING DOWN, DEPRESSED OR HOPELESS: 0
SUM OF ALL RESPONSES TO PHQ9 QUESTIONS 1 & 2: 0
SUM OF ALL RESPONSES TO PHQ QUESTIONS 1-9: 0
SUM OF ALL RESPONSES TO PHQ QUESTIONS 1-9: 0
SUM OF ALL RESPONSES TO PHQ9 QUESTIONS 1 & 2: 0
1. LITTLE INTEREST OR PLEASURE IN DOING THINGS: 0
SUM OF ALL RESPONSES TO PHQ QUESTIONS 1-9: 0

## 2022-02-09 ASSESSMENT — LIFESTYLE VARIABLES
HOW OFTEN DO YOU HAVE A DRINK CONTAINING ALCOHOL: 0
HOW OFTEN DO YOU HAVE A DRINK CONTAINING ALCOHOL: NEVER
AUDIT TOTAL SCORE: INCOMPLETE
AUDIT-C TOTAL SCORE: INCOMPLETE

## 2022-02-09 NOTE — PROGRESS NOTES
Medicare Annual Wellness Visit  Name: Terri Vidal Date: 2022   MRN: [id number] Sex: Male   Age: 80 y.o. Ethnicity: Non- / Non    : 1940 Race: White (non-)      Robert Rico is here for Medicare AWV    Screenings for behavioral, psychosocial and functional/safety risks, and cognitive dysfunction are all negative except as indicated below. These results, as well as other patient data from the [address] [facility] form, are documented in Flowsheets linked to this Encounter. Allergies   Allergen Reactions    Buspar [Buspirone Hcl]      Dizziness    Codeine     Shrimp Flavor Hives       Prior to Visit Medications    Medication Sig Taking? Authorizing Provider   atorvastatin (LIPITOR) 40 MG tablet TAKE 1 TABLET EVERY DAY BY MOUTH Yes Dulce Mario MD   isosorbide mononitrate (IMDUR) 30 MG extended release tablet TAKE 1 TABLET BY MOUTH EVERY DAY Yes Dulce Mario MD   montelukast (SINGULAIR) 10 MG tablet TAKE 1 TABLET BY MOUTH EVERY DAY Yes Dinah Cruz MD   levothyroxine (SYNTHROID) 50 MCG tablet Take 1 tablet by mouth daily Yes Dinah Cruz MD   metFORMIN (GLUCOPHAGE-XR) 500 MG extended release tablet TAKE 1 TABLET BY MOUTH ONCE DAILY IN THE EVENING WITH DINNER Yes Dinah Cruz MD   fluticasone (FLONASE) 50 MCG/ACT nasal spray 2 sprays by Each Nostril route daily Yes Dinah Cruz MD   meclizine (ANTIVERT) 12.5 MG tablet Take 1 tablet by mouth 3 times daily as needed for Dizziness Yes Dinah Cruz MD   cetirizine (ZYRTEC) 5 MG tablet Take 1 tablet by mouth daily as needed for Allergies or Rhinitis Yes Dinah Cruz MD   aspirin 81 MG tablet Take 81 mg by mouth daily. Yes Historical Provider, MD   Cholecalciferol (VITAMIN D3) 2000 UNIT CAPS Take  by mouth daily. Yes Historical Provider, MD   Coenzyme Q-10 50 MG TABS Take  by mouth daily.    Yes Historical Provider, MD   calcium-vitamin D (OSCAL-500) 500-200 MG-UNIT per tablet Take 1 tablet by mouth 2 times daily. Yes Historical Provider, MD       Past Medical History:   Diagnosis Date    Anxiety     Bradycardia     Cancer ([facility] [address] 07/2001    basal cell CA right preauricle    Colonic polyps     adenoma    COVID-19 10/13/2021    Diabetes mellitus ([facility] [address]     Diverticulosis     scattered    Dyspnea 08/2014    H/O cardiac catheterization 07/28/2021    Most significant stenosis is in RCA, it is 50 to 60 %.  Hyperlipidemia     Internal hemorrhoids 01/2012    Tubular adenoma of colon 01/2012    sigmoid       Past Surgical History:   Procedure Laterality Date    COLONOSCOPY  2012    polypodectomy     HEMICOLECTOMY  5/06    right    INGUINAL HERNIA REPAIR      MOHS SURGERY      resection       Family History   Problem Relation Age of Onset    Heart Disease Mother     Cancer Father     Cancer Brother        CareTeam (Including outside providers/suppliers regularly involved in providing care):   Patient Care Team:  Hector Brownlee MD as PCP - General (Internal Medicine)  Hector Brownlee MD as PCP - [facility] [employer] Provider  Tati Gutierrez MD as Consulting Physician (Cardiology)    Wt Readings from Last 3 Encounters:   02/09/22 207 lb (93.9 kg)   11/11/21 204 lb 12.8 oz (92.9 kg)   11/08/21 202 lb (91.6 kg)     Vitals:    02/09/22 1329   BP: 132/80   Site: Left Upper Arm   Position: Sitting   Cuff Size: Medium Adult   Pulse: 88   Temp: 97.1 °F (36.2 °C)   SpO2: 98%   Weight: 207 lb (93.9 kg)   Height: 5' 10.5\" (1.791 m)     Body mass index is 29.28 kg/m². Based upon direct observation of the patient, evaluation of cognition reveals recent and remote memory intact. Patient's complete Health Risk Assessment and screening values have been reviewed and are found in Flowsheets. The following problems were reviewed today and where indicated follow up appointments were made and/or referrals ordered.       Positive Risk Factor Screenings with Interventions:     Fall Risk:  2 or more falls in past year?: (!) yes  Fall with injury in past year?: no  Fall Risk Interventions:    · Home safety tips provided   · Patient had both accidental falls. Substance History:  Social History     Tobacco History     Smoking Status  Current Every Day Smoker Smoking Start Date  1[birth year]1/1955 Smoking Frequency  0.25 packs/day for 63 years (15.75 pk yrs) Smoking Tobacco Type  Cigarettes    Smokeless Tobacco Use  Never Used          Alcohol History     Alcohol Use Status  No Comment  stopped alcohol in 1991, drinks 3 to 4 cups of coffee per day          Drug Use     Drug Use Status  No          Sexual Activity     Sexually Active  Not Asked               Alcohol Screening:       A score of 8 or more is associated with harmful or hazardous drinking. A score of 13 or more in women, and 15 or more in men, is likely to indicate alcohol dependence. Substance Abuse Interventions:  · Tobacco abuse:  tobacco cessation tips and resources provided      General Health and ACP:  General  In general, how would you say your health is?: Good  In the past 7 days, have you experienced any of the following? New or Increased Pain, New or Increased Fatigue, Loneliness, Social Isolation, Stress or Anger?: None of These  Do you get the social and emotional support that you need?: Yes  Do you have a Living Will?: Yes  Advance Directives     Power of  Living Will ACP-Advance Directive ACP-Power of     Not on File Not on File Not on File Not on File      General Health Risk Interventions:  · patient has  aliving will.     Health Habits/Nutrition:  Health Habits/Nutrition  Do you exercise for at least 20 minutes 2-3 times per week?: (!) No  Have you lost any weight without trying in the past 3 months?: No  Do you eat only one meal per day?: No  Have you seen the dentist within the past year?: Yes  Body mass index: (!) 29.28  Health Habits/Nutrition Interventions:  · Inadequate physical activity:  patient agrees to exercise for at least 150 minutes/week    Hearing/Vision:  No exam data present  Hearing/Vision  Do you or your family notice any trouble with your hearing that hasn't been managed with hearing aids?: (!) Yes  Do you have difficulty driving, watching TV, or doing any of your daily activities because of your eyesight?: No  Have you had an eye exam within the past year?: Yes  Hearing/Vision Interventions:  · Hearing concerns:  audiology referral provided        Personalized Preventive Plan   Current Health Maintenance Status  Immunization History   Administered Date(s) Administered    COVID-19, Pfizer Purple top, DILUTE for use, 12+ yrs, 30mcg/0.3mL dose 01/28/2021, 02/18/2021, 12/04/2021    Influenza 11/03/2010, 12/01/2011    Influenza Virus Vaccine 10/16/2013    Influenza, High Dose (Fluzone 65 yrs and older) 09/28/2015, 09/22/2016, 01/10/2018, 10/16/2018    Influenza, Triv, inactivated, subunit, adjuvanted, IM (Fluad 65 yrs and older) 10/04/2019    Pneumococcal Conjugate 13-valent ([id number]) 03/01/2016    Pneumococcal Polysaccharide ([id number]) 12/01/2011    Td, unspecified formulation 04/18/2012    Tdap (Boostrix, Adacel) 04/21/2021    Tetanus 04/27/1992        Health Maintenance   Topic Date Due    Shingles Vaccine (1 of 2) Never done   [employer] Visit (AWV)  Never done    Flu vaccine (1) 09/01/2021    Depression Screen  04/21/2022    Lipid screen  11/08/2022    DTaP/Tdap/Td vaccine (2 - Td or Tdap) 04/21/2031    Pneumococcal 65+ years Vaccine  Completed    COVID-19 Vaccine  Completed    Hepatitis A vaccine  Aged Out    Hib vaccine  Aged Out    Meningococcal (ACWY) vaccine  Aged Out     Recommendations for [employer] Due: see orders and patient instructions/AVS.  . Recommended screening schedule for the next 5-10 years is provided to the patient in written form: see Patient Instructions/AVS.    Johnathan Salazar was seen today for medicare awv.     Diagnoses and all orders for this visit:    Type 2 diabetes mellitus without complication, without long-term current use of insulin (HCC)  -     POCT Glucose

## 2022-02-09 NOTE — PATIENT INSTRUCTIONS
We are committed to providing you the best care possible. If you receive a survey after visiting one of our offices, please take time to share your experience concerning your physician office visit. These surveys are confidential and no health information about you is shared. We are eager to improve for you and we are counting on your feedback to help make that happen. Stopping Smoking: Care Instructions  Your Care Instructions     Cigarette smokers crave the nicotine in cigarettes. Giving it up is much harder than simply changing a habit. Your body has to stop craving the nicotine. It is hard to quit, but you can do it. There are many tools that people use to quit smoking. You may find that combining tools works best for you. There are several steps to quitting. First you get ready to quit. Then you get support to help you. After that, you learn new skills and behaviors to become a nonsmoker. For many people, a necessary step is getting and using medicine. Your doctor will help you set up the plan that best meets your needs. You may want to attend a smoking cessation program to help you quit smoking. When you choose a program, look for one that has proven success. Ask your doctor for ideas. You will greatly increase your chances of success if you take medicine as well as get counseling or join a cessation program.  Some of the changes you feel when you first quit tobacco are uncomfortable. Your body will miss the nicotine at first, and you may feel short-tempered and grumpy. You may have trouble sleeping or concentrating. Medicine can help you deal with these symptoms. You may struggle with changing your smoking habits and rituals. The last step is the tricky one: Be prepared for the smoking urge to continue for a time. This is a lot to deal with, but keep at it. You will feel better. Follow-up care is a key part of your treatment and safety.  Be sure to make and go to all appointments, and call your doctor if you are having problems. It's also a good idea to know your test results and keep a list of the medicines you take. How can you care for yourself at home? · Ask your family, friends, and coworkers for support. You have a better chance of quitting if you have help and support. · Join a support group, such as Nicotine Anonymous, for people who are trying to quit smoking. · Consider signing up for a smoking cessation program, such as the American Lung Association's Freedom from Smoking program.  · Get text messaging support. Go to the website at www.smokefree. gov to sign up for the [facility] program.  · Set a quit date. Pick your date carefully so that it is not right in the middle of a big deadline or stressful time. Once you quit, do not even take a puff. Get rid of all ashtrays and lighters after your last cigarette. Clean your house and your clothes so that they do not smell of smoke. · Learn how to be a nonsmoker. Think about ways you can avoid those things that make you reach for a cigarette. ? Avoid situations that put you at greatest risk for smoking. For some people, it is hard to have a drink with friends without smoking. For others, they might skip a coffee break with coworkers who smoke. ? Change your daily routine. Take a different route to work or eat a meal in a different place. · Cut down on stress. Calm yourself or release tension by doing an activity you enjoy, such as reading a book, taking a hot bath, or gardening. · Talk to your doctor or pharmacist about nicotine replacement therapy, which replaces the nicotine in your body. You still get nicotine but you do not use tobacco. Nicotine replacement products help you slowly reduce the amount of nicotine you need. These products come in several forms, many of them available over-the-counter:  ? Nicotine patches  ? Nicotine gum and lozenges  ?  Nicotine inhaler  · Ask your doctor about bupropion (Wellbutrin) or varenicline (Chantix), which are prescription medicines. They do not contain nicotine. They help you by reducing withdrawal symptoms, such as stress and anxiety. · Some people find hypnosis, acupuncture, and massage helpful for ending the smoking habit. · Eat a healthy diet and get regular exercise. Having healthy habits will help your body move past its craving for nicotine. · Be prepared to keep trying. Most people are not successful the first few times they try to quit. Do not get mad at yourself if you smoke again. Make a list of things you learned and think about when you want to try again, such as next week, next month, or next year. Where can you learn more? Go to https://[employer]. org and sign in to your [employer] account. Enter Y613 in the [employer] box to learn more about \"Stopping Smoking: Care Instructions. \"     If you do not have an account, please click on the \"Sign Up Now\" link. Current as of: February 11, 2021               Content Version: 13.1  © 5218-0848 [employer]. Care instructions adapted under license by [facility] ([facility]). If you have questions about a medical condition or this instruction, always ask your healthcare professional. [address] any warranty or liability for your use of this information. Deciding About Using Medicines To Quit Smoking  How can you decide about using medicines to quit smoking? What are the medicines you can use? Your doctor may prescribe varenicline (Chantix) or bupropion (Zyban). These medicines can help you cope with cravings for tobacco. They are pills that don't contain nicotine. You also can use nicotine replacement products. These do contain nicotine. There are many types. · Gum and lozenges slowly release nicotine into your mouth. · Patches stick to your skin. They slowly release nicotine into your bloodstream.  · An inhaler has a paredes that contains nicotine.  You breathe in a puff of nicotine vapor through your mouth and throat. · Nasal spray releases a mist that contains nicotine. What are key points about this decision? · Using medicines can double your chances of quitting smoking. They can ease cravings and withdrawal symptoms. · Getting counseling along with using medicine can raise your chances of quitting even more. · If you smoke fewer than 5 cigarettes a day, you may not need medicines to help you quit smoking. · These medicines have less nicotine than cigarettes. And by itself, nicotine is not nearly as harmful as smoking. The tars, carbon monoxide, and other toxic chemicals in tobacco cause the harmful effects. · The side effects of nicotine replacement products depend on the type of product. For example, a patch can make your skin red and itchy. Medicines in pill form can make you sick to your stomach. They can also cause dry mouth and trouble sleeping. For most people, the side effects are not bad enough to make them stop using the products. Why might you choose to use medicines to quit smoking? · You have tried on your own to stop smoking, but you were not able to stop. · You smoke more than 5 cigarettes a day. · You want to increase your chances of quitting smoking. · You want to reduce your cravings and withdrawal symptoms. · You feel the benefits of medicine outweigh the side effects. Why might you choose not to use medicine? · You want to try quitting on your own by stopping all at once (\"cold turkey\"). · You want to cut back slowly on the number of cigarettes you smoke. · You smoke fewer than 5 cigarettes a day. · You do not like using medicine. · You feel the side effects of medicines outweigh the benefits. · You are worried about the cost of medicines. Your decision  Thinking about the facts and your feelings can help you make a decision that is right for you.  Be sure you understand the benefits and risks of your options, and think about what else you need to do before you make the decision. Where can you learn more? Go to https://chpepiceweb.[employer]. org and sign in to your [employer]Place account. Enter V743 in the [employer] box to learn more about \"Deciding About Using Medicines To Quit Smoking. \"     If you do not have an account, please click on the \"Sign Up Now\" link. Current as of: February 11, 2021               Content Version: 13.1  © 2006-2021 Healthwise, [employer]. Care instructions adapted under license by [facility] ([facility]). If you have questions about a medical condition or this instruction, always ask your healthcare professional. [place] [address] any warranty or liability for your use of this information. Personalized Preventive Plan for Charlene Sinha - 2/9/2022  Medicare offers a range of preventive health benefits. Some of the tests and screenings are paid in full while other may be subject to a deductible, co-insurance, and/or copay. Some of these benefits include a comprehensive review of your medical history including lifestyle, illnesses that may run in your family, and various assessments and screenings as appropriate. After reviewing your medical record and screening and assessments performed today your provider may have ordered immunizations, labs, imaging, and/or referrals for you. A list of these orders (if applicable) as well as your Preventive Care list are included within your After Visit Summary for your review. Other Preventive Recommendations:    · A preventive eye exam performed by an eye specialist is recommended every 1-2 years to screen for glaucoma; cataracts, macular degeneration, and other eye disorders. · A preventive dental visit is recommended every 6 months. · Try to get at least 150 minutes of exercise per week or 10,000 steps per day on a pedometer .   · Order or download the FREE \"Exercise & Physical Activity: Your Everyday Guide\" from [employer]. Call 2-312.102.5121 or search [employer] online. · You need 2611-2340 mg of calcium and 9018-4476 IU of vitamin D per day. It is possible to meet your calcium requirement with diet alone, but a vitamin D supplement is usually necessary to meet this goal.  · When exposed to the sun, use a sunscreen that protects against both UVA and UVB radiation with an SPF of 30 or greater. Reapply every 2 to 3 hours or after sweating, drying off with a towel, or swimming. · Always wear a seat belt when traveling in a car. Always wear a helmet when riding a bicycle or motorcycle.

## 2022-02-09 NOTE — PROGRESS NOTES
Vaccine Information Sheet, \"Influenza - Inactivated\"  given to Letty Canavan, or parent/legal guardian of  Letty Canavan and verbalized understanding. Patient responses:    Have you ever had a reaction to a flu vaccine? No  Are you able to eat eggs without adverse effects? Yes  Do you have any current illness? No  Have you ever had [place] Syndrome? No    Flu vaccine given per order. Please see immunization tab.

## 2022-03-29 DIAGNOSIS — G47.00 INSOMNIA, UNSPECIFIED TYPE: ICD-10-CM

## 2022-03-29 DIAGNOSIS — F41.9 ANXIETY: ICD-10-CM

## 2022-03-29 RX ORDER — LORAZEPAM 0.5 MG/1
0.5 TABLET ORAL NIGHTLY PRN
Qty: 10 TABLET | Refills: 1 | Status: SHIPPED | OUTPATIENT
Start: 2022-03-29 | End: 2022-04-28

## 2022-04-04 DIAGNOSIS — J30.9 ALLERGIC RHINITIS, UNSPECIFIED SEASONALITY, UNSPECIFIED TRIGGER: ICD-10-CM

## 2022-04-04 RX ORDER — MONTELUKAST SODIUM 10 MG/1
TABLET ORAL
Qty: 90 TABLET | Refills: 1 | Status: SHIPPED | OUTPATIENT
Start: 2022-04-04 | End: 2022-10-03

## 2022-04-08 DIAGNOSIS — E03.4 HYPOTHYROIDISM DUE TO ACQUIRED ATROPHY OF THYROID: ICD-10-CM

## 2022-04-08 RX ORDER — LEVOTHYROXINE SODIUM 0.05 MG/1
50 TABLET ORAL DAILY
Qty: 30 TABLET | Refills: 3 | Status: SHIPPED | OUTPATIENT
Start: 2022-04-08 | End: 2022-07-05

## 2022-05-11 ENCOUNTER — OFFICE VISIT (OUTPATIENT)
Dept: INTERNAL MEDICINE CLINIC | Age: 82
End: 2022-05-11
Payer: MEDICARE

## 2022-05-11 VITALS
HEART RATE: 68 BPM | SYSTOLIC BLOOD PRESSURE: 130 MMHG | HEIGHT: 71 IN | OXYGEN SATURATION: 97 % | WEIGHT: 206 LBS | BODY MASS INDEX: 28.84 KG/M2 | DIASTOLIC BLOOD PRESSURE: 78 MMHG

## 2022-05-11 DIAGNOSIS — E78.2 MIXED HYPERLIPIDEMIA: ICD-10-CM

## 2022-05-11 DIAGNOSIS — E55.9 VITAMIN D DEFICIENCY: ICD-10-CM

## 2022-05-11 DIAGNOSIS — R42 VERTIGO: ICD-10-CM

## 2022-05-11 DIAGNOSIS — Z86.16 HISTORY OF COVID-19: ICD-10-CM

## 2022-05-11 DIAGNOSIS — R53.83 FATIGUE, UNSPECIFIED TYPE: ICD-10-CM

## 2022-05-11 DIAGNOSIS — E11.9 TYPE 2 DIABETES MELLITUS WITHOUT COMPLICATION, WITHOUT LONG-TERM CURRENT USE OF INSULIN (HCC): Primary | ICD-10-CM

## 2022-05-11 DIAGNOSIS — J30.9 ALLERGIC RHINITIS, UNSPECIFIED SEASONALITY, UNSPECIFIED TRIGGER: ICD-10-CM

## 2022-05-11 DIAGNOSIS — F41.9 ANXIETY: ICD-10-CM

## 2022-05-11 DIAGNOSIS — I25.119 CORONARY ARTERY DISEASE INVOLVING NATIVE CORONARY ARTERY OF NATIVE HEART WITH ANGINA PECTORIS (HCC): ICD-10-CM

## 2022-05-11 DIAGNOSIS — Z91.81 AT HIGH RISK FOR FALLS: ICD-10-CM

## 2022-05-11 DIAGNOSIS — Z12.5 SCREENING FOR PROSTATE CANCER: ICD-10-CM

## 2022-05-11 LAB
A/G RATIO: 1.4 (ref 1.1–2.2)
ALBUMIN SERPL-MCNC: 4.2 G/DL (ref 3.4–5)
ALP BLD-CCNC: 91 U/L (ref 40–129)
ALT SERPL-CCNC: 14 U/L (ref 10–40)
ANION GAP SERPL CALCULATED.3IONS-SCNC: 17 MMOL/L (ref 3–16)
AST SERPL-CCNC: 24 U/L (ref 15–37)
BASOPHILS ABSOLUTE: 0 K/UL (ref 0–0.2)
BASOPHILS RELATIVE PERCENT: 0.5 %
BILIRUB SERPL-MCNC: 0.7 MG/DL (ref 0–1)
BUN BLDV-MCNC: 25 MG/DL (ref 7–20)
CALCIUM SERPL-MCNC: 9.8 MG/DL (ref 8.3–10.6)
CHLORIDE BLD-SCNC: 102 MMOL/L (ref 99–110)
CHOLESTEROL, FASTING: 144 MG/DL (ref 0–199)
CHP ED QC CHECK: NORMAL
CO2: 22 MMOL/L (ref 21–32)
CREAT SERPL-MCNC: 1.2 MG/DL (ref 0.8–1.3)
EOSINOPHILS ABSOLUTE: 0.2 K/UL (ref 0–0.6)
EOSINOPHILS RELATIVE PERCENT: 2.3 %
GFR AFRICAN AMERICAN: >60
GFR NON-AFRICAN AMERICAN: 58
GLUCOSE BLD-MCNC: 115 MG/DL (ref 70–99)
GLUCOSE BLD-MCNC: 139 MG/DL
HCT VFR BLD CALC: 42.3 % (ref 40.5–52.5)
HDLC SERPL-MCNC: 57 MG/DL (ref 40–60)
HEMOGLOBIN: 14.1 G/DL (ref 13.5–17.5)
LDL CHOLESTEROL CALCULATED: 65 MG/DL
LYMPHOCYTES ABSOLUTE: 1.9 K/UL (ref 1–5.1)
LYMPHOCYTES RELATIVE PERCENT: 20.9 %
MCH RBC QN AUTO: 32 PG (ref 26–34)
MCHC RBC AUTO-ENTMCNC: 33.2 G/DL (ref 31–36)
MCV RBC AUTO: 96.3 FL (ref 80–100)
MONOCYTES ABSOLUTE: 0.7 K/UL (ref 0–1.3)
MONOCYTES RELATIVE PERCENT: 7.9 %
NEUTROPHILS ABSOLUTE: 6.2 K/UL (ref 1.7–7.7)
NEUTROPHILS RELATIVE PERCENT: 68.4 %
PDW BLD-RTO: 14.2 % (ref 12.4–15.4)
PLATELET # BLD: 293 K/UL (ref 135–450)
PMV BLD AUTO: 8.5 FL (ref 5–10.5)
POTASSIUM SERPL-SCNC: 5.5 MMOL/L (ref 3.5–5.1)
PROSTATE SPECIFIC ANTIGEN: 0.04 NG/ML (ref 0–4)
RBC # BLD: 4.39 M/UL (ref 4.2–5.9)
SODIUM BLD-SCNC: 141 MMOL/L (ref 136–145)
TOTAL PROTEIN: 7.2 G/DL (ref 6.4–8.2)
TRIGLYCERIDE, FASTING: 110 MG/DL (ref 0–150)
VLDLC SERPL CALC-MCNC: 22 MG/DL
WBC # BLD: 9 K/UL (ref 4–11)

## 2022-05-11 PROCEDURE — 1123F ACP DISCUSS/DSCN MKR DOCD: CPT | Performed by: INTERNAL MEDICINE

## 2022-05-11 PROCEDURE — 36415 COLL VENOUS BLD VENIPUNCTURE: CPT | Performed by: INTERNAL MEDICINE

## 2022-05-11 PROCEDURE — 82962 GLUCOSE BLOOD TEST: CPT | Performed by: INTERNAL MEDICINE

## 2022-05-11 PROCEDURE — 99214 OFFICE O/P EST MOD 30 MIN: CPT | Performed by: INTERNAL MEDICINE

## 2022-05-11 PROCEDURE — 4004F PT TOBACCO SCREEN RCVD TLK: CPT | Performed by: INTERNAL MEDICINE

## 2022-05-11 PROCEDURE — G8417 CALC BMI ABV UP PARAM F/U: HCPCS | Performed by: INTERNAL MEDICINE

## 2022-05-11 PROCEDURE — 4040F PNEUMOC VAC/ADMIN/RCVD: CPT | Performed by: INTERNAL MEDICINE

## 2022-05-11 PROCEDURE — G8427 DOCREV CUR MEDS BY ELIG CLIN: HCPCS | Performed by: INTERNAL MEDICINE

## 2022-05-11 RX ORDER — M-VIT,TX,IRON,MINS/CALC/FOLIC 27MG-0.4MG
1 TABLET ORAL DAILY
Qty: 90 TABLET | Refills: 2 | Status: SHIPPED | OUTPATIENT
Start: 2022-05-11 | End: 2023-05-11

## 2022-05-11 SDOH — ECONOMIC STABILITY: FOOD INSECURITY: WITHIN THE PAST 12 MONTHS, YOU WORRIED THAT YOUR FOOD WOULD RUN OUT BEFORE YOU GOT MONEY TO BUY MORE.: NEVER TRUE

## 2022-05-11 SDOH — ECONOMIC STABILITY: FOOD INSECURITY: WITHIN THE PAST 12 MONTHS, THE FOOD YOU BOUGHT JUST DIDN'T LAST AND YOU DIDN'T HAVE MONEY TO GET MORE.: NEVER TRUE

## 2022-05-11 ASSESSMENT — PATIENT HEALTH QUESTIONNAIRE - PHQ9
SUM OF ALL RESPONSES TO PHQ QUESTIONS 1-9: 0
SUM OF ALL RESPONSES TO PHQ QUESTIONS 1-9: 0
SUM OF ALL RESPONSES TO PHQ9 QUESTIONS 1 & 2: 0
1. LITTLE INTEREST OR PLEASURE IN DOING THINGS: 0
2. FEELING DOWN, DEPRESSED OR HOPELESS: 0
SUM OF ALL RESPONSES TO PHQ QUESTIONS 1-9: 0
SUM OF ALL RESPONSES TO PHQ QUESTIONS 1-9: 0

## 2022-05-11 ASSESSMENT — SOCIAL DETERMINANTS OF HEALTH (SDOH): HOW HARD IS IT FOR YOU TO PAY FOR THE VERY BASICS LIKE FOOD, HOUSING, MEDICAL CARE, AND HEATING?: NOT HARD AT ALL

## 2022-05-11 NOTE — PROGRESS NOTES
Name: Cloyd Alpers  [id number]  Age: 80 y.o. YOB: 1940  Sex: male    CHIEF COMPLAINT:    Chief Complaint   Patient presents with    Diabetes    Hyperlipidemia    Other     other chronic conditions       HISTORY OF PRESENT ILLNESS:     This is a pleasant  80 y.o. male  is seen today for management of chronic medical problems and medications refills. Previous records reviewed . Doing OK . Dizziness has improved. But complains of getting tired easily. Denies CP or SOB. Being followed by Dr. Ceron [facility] periodically for his cardiac problems. No fever , sore throat or cough or congestion. Denies any abdominal pain. Appetite OK. Bowels moving [id number] [place] [address] No urinary symptoms. Denies any significant arthritis. Hearing is ok. Vision Ok with glasses. Denies  any significant skin lesions. Denies any significant depression or anxiety. Patient claims that he does not check blood sugars at home and he quit doing it because his blood sugars were normal most of the time. No other complaints.   He has been fully vaccinated for COVID-19 and also boosted x1      Past Medical History:    Patient Active Problem List   Diagnosis    Type 2 diabetes mellitus ([address]    Hyperlipidemia    Diverticulosis    Hx of adenomatous colonic polyps    Family history of prostate cancer    Anxiety    Dyspnea    History of 24 hour EKG monitoring    Giant cell arteritis ([address]    Shingles    Insomnia    Vertigo    Allergic rhinitis    Need for Tdap vaccination    Precordial pain    Coronary artery disease involving native coronary artery of native heart with angina pectoris ([address]    Tobacco abuse    Fatigue    History of COVID-19    Vitamin D deficiency        Past Surgical History:        Procedure Laterality Date    COLONOSCOPY  2012    polypodectomy     HEMICOLECTOMY  5/06    right    INGUINAL HERNIA REPAIR      MOHS SURGERY      resection       Social History:   Social History     Tobacco Use    Smoking status: Current Every Day Smoker     Packs/day: 0.25     Years: 63.00     Pack years: 15.75     Types: Cigarettes     Start date: 1/1/1955    Smokeless tobacco: Never Used   Substance Use Topics    Alcohol use: No     Comment: stopped alcohol in 1991, drinks 3 to 4 cups of coffee per day       Family History:       Problem Relation Age of Onset    Heart Disease Mother     Cancer Father     Cancer Brother        Allergies:  Buspar [buspirone hcl], Codeine, and Shrimp flavor    Current Medications :      Prior to Admission medications    Medication Sig Start Date End Date Taking? Authorizing Provider   Multiple Vitamins-Minerals (THERAPEUTIC MULTIVITAMIN-MINERALS) tablet Take 1 tablet by mouth daily 5/11/22 5/11/23 Yes Yessi Negro MD   levothyroxine (SYNTHROID) 50 MCG tablet Take 1 tablet by mouth daily 4/8/22  Yes Yessi Negro MD   montelukast (SINGULAIR) 10 MG tablet TAKE 1 TABLET BY MOUTH EVERY DAY 4/4/22  Yes Yessi Negro MD   atorvastatin (LIPITOR) 40 MG tablet TAKE 1 TABLET EVERY DAY BY MOUTH 1/3/22  Yes Nazanin Bejarano MD   isosorbide mononitrate (IMDUR) 30 MG extended release tablet TAKE 1 TABLET BY MOUTH EVERY DAY 1/3/22  Yes Nazanin Bejarano MD   metFORMIN (GLUCOPHAGE-XR) 500 MG extended release tablet TAKE 1 TABLET BY MOUTH ONCE DAILY IN THE EVENING WITH DINNER 11/12/21  Yes Yessi Negro MD   fluticasone (FLONASE) 50 MCG/ACT nasal spray 2 sprays by Each Nostril route daily 7/21/21  Yes Yessi Negro MD   meclizine (ANTIVERT) 12.5 MG tablet Take 1 tablet by mouth 3 times daily as needed for Dizziness 4/21/21  Yes Yessi Negro MD   cetirizine (ZYRTEC) 5 MG tablet Take 1 tablet by mouth daily as needed for Allergies or Rhinitis 4/21/21  Yes Yessi Negro MD   aspirin 81 MG tablet Take 81 mg by mouth daily. Yes Historical Provider, MD   Cholecalciferol (VITAMIN D3) 2000 UNIT CAPS Take  by mouth daily. Yes Historical Provider, MD   Coenzyme Q-10 50 MG TABS Take  by mouth daily.      Yes Historical Provider, MD   calcium-vitamin D (OSCAL-500) 500-200 MG-UNIT per tablet Take 1 tablet by mouth 2 times daily. Yes Historical Provider, MD       LAB DATA: Reviewed. REVIEW OF SYSTEMS:   see HPI/ Comprehensive review of systems negative except for the ones mentioned in HPI. PHYSICAL EXAMINATION:   /78 (Site: Left Upper Arm, Position: Sitting, Cuff Size: Medium Adult)   Pulse 68   Ht 5' 10.5\" (1.791 m)   Wt 206 lb (93.4 kg)   SpO2 97%   BMI 29.14 kg/m²      GENERAL APPEARANCE:      Alert, oriented x 3, well developed, cooperative, not in any distress, appears stated age. HEAD:                         Normocephalic, atraumatic   EYES:                          PERRLA, EOMI, lids normal, conjuctivea clear, sclera anicteric. NECK:                         Supple, symmetrical,  trachea midline, no thyromegaly, no JVD, no lymphadenopathy. LUNGS:                       Clear to auscultation bilaterally, respirations unlabored, accessory muscles are not used. HEART:                       Regular rate and rhythm, S1 and S2 normal, no murmur, rub or gallop. PMI in MCL. ABDOMEN:                 Soft, non-tender, bowel sounds are normoactive, no masses, no hepatospleenomegaly. EXTREMITY:              no bipedal edema  NEURO:                      Alert, oriented to person, place and time. Grossly intact. Musculoskeletal:         No kyphosis or scoliosis, no deformity in any extremity noted, muscle strength and tone are normal.  Skin:                            Warm and dry. No rash or obvious suspicious lesions. PSYCH:                       Mood euthymic, insight and judgement good. ASSESSMENT/PLAN:    1. Type 2 diabetes mellitus without complication, without long-term current use of insulin (HCC)  Continue Glucophage and advised low sugar diet and exercise. Advised to check his sugars periodically.   - POCT Glucose  - Comprehensive Metabolic Panel  - Hemoglobin A1C    2. Coronary artery disease involving native coronary artery of native heart with angina pectoris (HCC)  Continue aspirin, Imdur, Lipitor. Advised to continue to follow with his cardiologist as per his recommendations. 3. Anxiety  Anxiety is better. He is not on any medication for it at this time. 4. Allergic rhinitis, unspecified seasonality, unspecified trigger  Continue Zyrtec and Flonase as needed    5. History of COVID-19  Improved, no residual effects at this time. 6. Mixed hyperlipidemia  Patient is taking cholesterol medications regularly. Denies any side effects. Diet and exercise advised. Continue Lipitor  - Lipid, Fasting    7. Fatigue, unspecified type  Add multivitamin once daily.  - CBC with Auto Differential  - Multiple Vitamins-Minerals (THERAPEUTIC MULTIVITAMIN-MINERALS) tablet; Take 1 tablet by mouth daily  Dispense: 90 tablet; Refill: 2    8. Vertigo  Improved. Takes meclizine as needed. 9. Vitamin D deficiency  Continue vitamin D3.    10. Screening for prostate cancer  - PSA Screening; Future    Advised to continue to follow COVID-19 precautions    Care discussed with patient. Questions answered and patient verbalizes understanding and agrees with plan. Medications reviewed and reconciled. Continue current medications. Appropriate prescriptions are ordered. Risks and benefits of meds are discussed. After visit summary provided. Advised to call for any problems, questions, or concerns. If symptoms worsen or don't improve as expected, to call us or go to ER. Follow up as directed, sooner if needed. Return in about 3 months (around 8/11/2022). This dictation was performed with a verbal recognition program and it was checked for errors. It is possible that there are still dictated errors within this office note. Any errors should be brought immediately to my attention for correction.  All efforts were made to ensure that this office note is accurate. Johny Uribe MD MD        On the basis of positive falls risk screening, assessment and plan is as follows: home safety tips provided.

## 2022-05-12 LAB
ESTIMATED AVERAGE GLUCOSE: 122.6 MG/DL
HBA1C MFR BLD: 5.9 %

## 2022-05-19 ENCOUNTER — OFFICE VISIT (OUTPATIENT)
Dept: CARDIOLOGY CLINIC | Age: 82
End: 2022-05-19
Payer: MEDICARE

## 2022-05-19 VITALS
SYSTOLIC BLOOD PRESSURE: 110 MMHG | BODY MASS INDEX: 29.63 KG/M2 | DIASTOLIC BLOOD PRESSURE: 68 MMHG | HEART RATE: 64 BPM | HEIGHT: 70 IN | WEIGHT: 207 LBS

## 2022-05-19 DIAGNOSIS — Z86.16 HISTORY OF COVID-19: ICD-10-CM

## 2022-05-19 DIAGNOSIS — E78.2 MIXED HYPERLIPIDEMIA: ICD-10-CM

## 2022-05-19 DIAGNOSIS — I25.119 CORONARY ARTERY DISEASE INVOLVING NATIVE CORONARY ARTERY OF NATIVE HEART WITH ANGINA PECTORIS (HCC): Primary | ICD-10-CM

## 2022-05-19 DIAGNOSIS — E11.9 TYPE 2 DIABETES MELLITUS WITHOUT COMPLICATION, WITHOUT LONG-TERM CURRENT USE OF INSULIN (HCC): ICD-10-CM

## 2022-05-19 DIAGNOSIS — Z72.0 TOBACCO ABUSE: ICD-10-CM

## 2022-05-19 PROCEDURE — G8427 DOCREV CUR MEDS BY ELIG CLIN: HCPCS | Performed by: INTERNAL MEDICINE

## 2022-05-19 PROCEDURE — 99213 OFFICE O/P EST LOW 20 MIN: CPT | Performed by: INTERNAL MEDICINE

## 2022-05-19 PROCEDURE — 3044F HG A1C LEVEL LT 7.0%: CPT | Performed by: INTERNAL MEDICINE

## 2022-05-19 PROCEDURE — 4040F PNEUMOC VAC/ADMIN/RCVD: CPT | Performed by: INTERNAL MEDICINE

## 2022-05-19 PROCEDURE — 1123F ACP DISCUSS/DSCN MKR DOCD: CPT | Performed by: INTERNAL MEDICINE

## 2022-05-19 PROCEDURE — G8417 CALC BMI ABV UP PARAM F/U: HCPCS | Performed by: INTERNAL MEDICINE

## 2022-05-19 PROCEDURE — 4004F PT TOBACCO SCREEN RCVD TLK: CPT | Performed by: INTERNAL MEDICINE

## 2022-05-19 NOTE — PROGRESS NOTES
OFFICE PROGRESS NOTES      Felisha Narayan is a 80 y.o. male who has    CHIEF COMPLAINT AS FOLLOWS:  CHEST PAIN: Patient denies any C/O chest pains at this time.      SOB: No C/O SOB at this time.              .   LEG EDEMA: No leg edema   PALPITATIONS: Denies any C/O Palpitations   DIZZINESS: No C/O Dizziness   SYNCOPE: None   OTHER/ ADDITIONAL COMPLAINTS:                                     HPI: Patient is here for F/U on his CAD  & Dyslipidemia problems. CAD: Patient has known CAD. Dyslipidemia: Patient has known mixed dyslipidemia. Has been treated with guideline recommended medical / physical/ diet therapy as stated below. Current Outpatient Medications   Medication Sig Dispense Refill    Multiple Vitamins-Minerals (THERAPEUTIC MULTIVITAMIN-MINERALS) tablet Take 1 tablet by mouth daily 90 tablet 2    levothyroxine (SYNTHROID) 50 MCG tablet Take 1 tablet by mouth daily 30 tablet 3    montelukast (SINGULAIR) 10 MG tablet TAKE 1 TABLET BY MOUTH EVERY DAY 90 tablet 1    atorvastatin (LIPITOR) 40 MG tablet TAKE 1 TABLET EVERY DAY BY MOUTH 30 tablet 5    isosorbide mononitrate (IMDUR) 30 MG extended release tablet TAKE 1 TABLET BY MOUTH EVERY DAY 30 tablet 5    metFORMIN (GLUCOPHAGE-XR) 500 MG extended release tablet TAKE 1 TABLET BY MOUTH ONCE DAILY IN THE EVENING WITH DINNER 90 tablet 3    fluticasone (FLONASE) 50 MCG/ACT nasal spray 2 sprays by Each Nostril route daily 1 Bottle 3    meclizine (ANTIVERT) 12.5 MG tablet Take 1 tablet by mouth 3 times daily as needed for Dizziness 30 tablet 1    cetirizine (ZYRTEC) 5 MG tablet Take 1 tablet by mouth daily as needed for Allergies or Rhinitis 30 tablet 3    aspirin 81 MG tablet Take 81 mg by mouth daily.  Cholecalciferol (VITAMIN D3) 2000 UNIT CAPS Take  by mouth daily.  Coenzyme Q-10 50 MG TABS Take  by mouth daily.  calcium-vitamin D (OSCAL-500) 500-200 MG-UNIT per tablet Take 1 tablet by mouth 2 times daily.        No current facility-administered medications for this visit. Allergies: Buspar [buspirone hcl], Codeine, and Shrimp flavor  Review of Systems:    Constitutional: Negative for diaphoresis and fatigue  Respiratory: Negative for shortness of breath  Cardiovascular: Negative for chest pain, dyspnea on exertion, claudication, edema, irregular heartbeat, murmur, palpitations or shortness of breath  Musculoskeletal: Negative for muscle pain, muscular weakness, negative for pain in arm and leg or swelling in foot and leg    Objective:  /68   Pulse 64   Ht 5' 10\" (1.778 m)   Wt 207 lb (93.9 kg)   BMI 29.70 kg/m²   Wt Readings from Last 3 Encounters:   05/19/22 207 lb (93.9 kg)   05/11/22 206 lb (93.4 kg)   02/09/22 207 lb (93.9 kg)     Body mass index is 29.7 kg/m². GENERAL - Alert, oriented, pleasant, in no apparent distress. EYES: No jaundice, no conjunctival pallor. Neck - Supple. No jugular venous distention noted. No carotid bruits. Cardiovascular - Normal S1 and S2 without obvious murmur or gallop. Extremities - No cyanosis, clubbing, or significant edema. Pulmonary - No respiratory distress. No wheezes or rales.       MEDICAL DECISION MAKING & DATA REVIEW:    Lab Review   No results found for: TROPONINT  No results found for: BNP, PROBNP  Lab Results   Component Value Date    INR 0.90 07/27/2021     Lab Results   Component Value Date    LABA1C 5.9 05/11/2022    LABA1C 5.9 11/08/2021     Lab Results   Component Value Date    WBC 9.0 05/11/2022    WBC 9.0 11/08/2021    HCT 42.3 05/11/2022    HCT 42.3 11/08/2021    MCV 96.3 05/11/2022    MCV 95.6 11/08/2021     05/11/2022     11/08/2021     Lab Results   Component Value Date    CHOL 169 09/15/2020    CHOL 160 10/03/2019    TRIG 197 (H) 09/15/2020    TRIG 147 10/03/2019    HDL 57 05/11/2022    HDL 51 11/08/2021    LDLCALC 65 05/11/2022    LDLCALC 61 11/08/2021    LDLDIRECT 80 09/15/2020     Lab Results   Component Value Date    ALT 14 05/11/2022    ALT 14 11/08/2021    AST 24 05/11/2022    AST 25 11/08/2021     BMP:    Lab Results   Component Value Date     05/11/2022     11/08/2021    K 5.5 05/11/2022    K 5.5 11/08/2021     05/11/2022    CL 99 11/08/2021    CO2 22 05/11/2022    CO2 24 11/08/2021    BUN 25 05/11/2022    BUN 19 11/08/2021    CREATININE 1.2 05/11/2022    CREATININE 1.1 11/08/2021     CMP:   Lab Results   Component Value Date     05/11/2022     11/08/2021    K 5.5 05/11/2022    K 5.5 11/08/2021     05/11/2022    CL 99 11/08/2021    CO2 22 05/11/2022    CO2 24 11/08/2021    BUN 25 05/11/2022    BUN 19 11/08/2021    CREATININE 1.2 05/11/2022    CREATININE 1.1 11/08/2021    PROT 7.2 05/11/2022    PROT 7.3 11/08/2021    PROT 7.5 01/02/2013    PROT 7.3 06/15/2012     Lab Results   Component Value Date    TSH 4.62 11/08/2021    TSH 3.36 03/26/2019    TSHHS 2.676 11/03/2010       QUALITY MEASURES REVIEWED:  1.CAD:Patient is taking anti platelet agent:Yes  2. DYSLIPIDEMIA: Patient is on cholesterol lowering medication:Yes   3. Beta-Blocker therapy for CAD, if prior Myocardial Infarction:No   4. Counselled regarding smoking cessation. No   Patient does not Smoke. 5.Anticoagulation therapy (for A.Fib) No   Does Not have A.Fib.    6.Discussed weight management strategies. Assessment & Plan:  Primary / Secondary prevention is the goal by aggressive risk modification, healthy and therapeutic life style changes for cardiovascular risk reduction. CORONARY ARTERY DISEASE:Yes  clinically stable. Patient is on optimal medical regimen ( see medication list above )  Patient is currently  ?symptomatic from CAD. -changes in  treatment:  no,on ASA & iMDUR   -Testing ordered:  no  [place] classification: 3  CATH 7/28/2021   Most significant stenosis is in RCA, it is 50 to 60 % proximal   stenosis.   Normal LV systolic function. LVEF is 50 to 55 %. HTN:No History  VHD: NONE SIGNIFICANT.   ECHO 7/26/2021   Left ventricular function is low normal, EF is estimated at 50-55%.   Mild left ventricular hypertrophy.   Normal diastolic filling pattern for age.  Juline File dilated left atrium.   Mild mitral and tricuspid regurgitation is present.  RVSP is 28 mmHg.   No evidence of pericardial effusion. DYSLIPIDEMIA: yes,   Patient's profile is at / near [employer] current medical regimen wellyes, Takes Lipitor  See most recent Lab values in Labs section above. Diabetes mellitis:yes,   BS under good control yes,   Hgb A1c avilable yes,     TESTS ORDERED: none this visit     PREVIOUSLY ORDERED TESTS REVIEWED & DISCUSSED WITH THE PATIENT:     I personally reviewed & interpreted, all previously ordered tests as copied above. Latest Labs are pulled in to the note with dates. Labs, specially in Reference to Lipid profile, Cardiac testing in the form of Echo ( dated: ), stress tests ( dated: ) & other relevant cardiac testing reviewed with patient & recommendations made based on assessment of the results. Discussed role of Cardiac risk factors & effects + treatment of co morbidities with patient & advised accordingly. MEDICATIONS: List of medications patient is currently taking is reviewed in detail with the patient. Discussed any side effects or problems taking the medication. Recommend Continue present management & medications as listed. AFFIRMATION: I spent at least 20 minutes of time reviewing patient's history, previous & current medical problems & all Labs + testing. This includes chart prep even prior to the vosit. Various goals are discussed and multiple questions answered. Relevant concelling performed. Office follow up in six months.

## 2022-05-19 NOTE — PATIENT INSTRUCTIONS
Please be informed that if you contact our office outside of normal business hours the physician on call cannot help with any scheduling or rescheduling issues, procedure instruction questions or any type of medication issue. We advise you for any urgent/emergency that you go to the nearest emergency room! PLEASE CALL OUR OFFICE DURING NORMAL BUSINESS HOURS    Monday - Friday   8 am to 5 pm    [facility] [address]: [phone]    [place]:  [phone]  **It is YOUR responsibilty to bring medication bottles and/or updated medication list to [address] [place] [address]. This will allow us to better serve you and all your healthcare needs**  [facility] Laboratory Locations - No appointment necessary. Sites open Monday to Friday. Call your preferred location for test preparation, business hours and other information you need. [employer] accepts [employer]. [facility]   [facility] [address] [facility], [address] [facility]  Phone: [phone] [facility] [address]  [address]. [facility], [address] [facility]  Phone: [phone]     CORONARY ARTERY DISEASE:Yes  clinically stable. Patient is on optimal medical regimen ( see medication list above )  Patient is currently  ?symptomatic from CAD. -changes in  treatment:  no,on ASA & iMDUR   -Testing ordered:  no  [place] classification: 3  CATH 7/28/2021   Most significant stenosis is in RCA, it is 50 to 60 % proximal   stenosis.   Normal LV systolic function. LVEF is 50 to 55 %. HTN:No History  VHD: NONE SIGNIFICANT. ECHO 7/26/2021   Left ventricular function is low normal, EF is estimated at 50-55%.   Mild left ventricular hypertrophy.   Normal diastolic filling pattern for age.  [place] dilated left atrium.   Mild mitral and tricuspid regurgitation is present.  RVSP is 28 mmHg.   No evidence of pericardial effusion.     DYSLIPIDEMIA: yes,   Patient's profile is at / near [facility] current medical regimen wellyes, Takes Lipitor  See most recent Lab values in Labs section above. Diabetes mellitis:yes,   BS under good control yes,   Hgb A1c avilable yes,     TESTS ORDERED: none this visit     PREVIOUSLY ORDERED TESTS REVIEWED & DISCUSSED WITH THE PATIENT:     I personally reviewed & interpreted, all previously ordered tests as copied above. Latest Labs are pulled in to the note with dates. Labs, specially in Reference to Lipid profile, Cardiac testing in the form of Echo ( dated: ), stress tests ( dated: ) & other relevant cardiac testing reviewed with patient & recommendations made based on assessment of the results. Discussed role of Cardiac risk factors & effects + treatment of co morbidities with patient & advised accordingly. MEDICATIONS: List of medications patient is currently taking is reviewed in detail with the patient. Discussed any side effects or problems taking the medication. Recommend Continue present management & medications as listed. AFFIRMATION: I spent at least 20 minutes of time reviewing patient's history, previous & current medical problems & all Labs + testing. This includes chart prep even prior to the vosit. Various goals are discussed and multiple questions answered. Relevant concelling performed. Office follow up in six months. Area L Indication Text: Tumors in this location are included in Area L (trunk and extremities).  Mohs surgery is indicated for larger tumors, or tumors with aggressive histologic features, in these anatomic locations.

## 2022-05-19 NOTE — LETTER
[address]  [address] [address]  Phone: [phone]  Fax: [phone]    Stephanie Rodriguez MD    May 19, 2022     Deon Jeans, MD  [facility] [phone]    Patient: Nathan Simental   MR Number: [id number]   YOB: 1940   Date of Visit: 5/19/2022       Dear Deon Jeans:    Thank you for referring Blank Lam to me for evaluation/treatment. Below are the relevant portions of my assessment and plan of care. If you have questions, please do not hesitate to call me. I look forward to following Susana Irene along with you.     Sincerely,      Stephanie Rodriguez MD

## 2022-05-19 NOTE — LETTER
2022  1:00 PM    Patient Name: Rahel Ruelas  : 1940  MRN# [id number]    REASON FOR VISIT: 6 Month     Patient Active Problem List    Diagnosis Date Noted    Tobacco abuse 2021    Fatigue 2021    History of COVID-19 2021    Vitamin D deficiency 2021    Coronary artery disease involving native coronary artery of native heart with angina pectoris ([facility] 2021    Precordial pain 2021    Vertigo 2021    Allergic rhinitis 2021    Need for Tdap vaccination 2021    Insomnia 2020    Shingles 10/25/2017    Giant cell arteritis ([facility] 10/17/2017    History of 24 hour EKG monitoring 2014    Anxiety     Dyspnea 2014    Type 2 diabetes mellitus ([facility] 2011    Hyperlipidemia 2011    Diverticulosis 2011    Hx of adenomatous colonic polyps 2011    Family history of prostate cancer 2011       Allergies: Buspar [buspirone hcl], Codeine, and Shrimp flavor      Current Outpatient Medications   Medication Sig Dispense Refill    Multiple Vitamins-Minerals (THERAPEUTIC MULTIVITAMIN-MINERALS) tablet Take 1 tablet by mouth daily 90 tablet 2    levothyroxine (SYNTHROID) 50 MCG tablet Take 1 tablet by mouth daily 30 tablet 3    montelukast (SINGULAIR) 10 MG tablet TAKE 1 TABLET BY MOUTH EVERY DAY 90 tablet 1    atorvastatin (LIPITOR) 40 MG tablet TAKE 1 TABLET EVERY DAY BY MOUTH 30 tablet 5    isosorbide mononitrate (IMDUR) 30 MG extended release tablet TAKE 1 TABLET BY MOUTH EVERY DAY 30 tablet 5    metFORMIN (GLUCOPHAGE-XR) 500 MG extended release tablet TAKE 1 TABLET BY MOUTH ONCE DAILY IN THE EVENING WITH DINNER 90 tablet 3    fluticasone (FLONASE) 50 MCG/ACT nasal spray 2 sprays by Each Nostril route daily 1 Bottle 3    meclizine (ANTIVERT) 12.5 MG tablet Take 1 tablet by mouth 3 times daily as needed for Dizziness 30 tablet 1    cetirizine (ZYRTEC) 5 MG tablet Take 1 tablet by mouth daily as needed for Allergies or Rhinitis 30 tablet 3    aspirin 81 MG tablet Take 81 mg by mouth daily.  Cholecalciferol (VITAMIN D3) 2000 UNIT CAPS Take  by mouth daily.  Coenzyme Q-10 50 MG TABS Take  by mouth daily.  calcium-vitamin D (OSCAL-500) 500-200 MG-UNIT per tablet Take 1 tablet by mouth 2 times daily. No current facility-administered medications for this visit.          Lab Review   Lab Results   Component Value Date    CKTOTAL 85 10/03/2019    CKTOTAL 59 03/26/2019     BNP:  No results found for: BNP, PROBNP  PT/INR:    Lab Results   Component Value Date    INR 0.90 07/27/2021     Lab Results   Component Value Date    LABA1C 5.9 05/11/2022    LABA1C 5.9 11/08/2021     Lab Results   Component Value Date    WBC 9.0 05/11/2022    WBC 9.0 11/08/2021    HCT 42.3 05/11/2022    HCT 42.3 11/08/2021    MCV 96.3 05/11/2022    MCV 95.6 11/08/2021     05/11/2022     11/08/2021     Lab Results   Component Value Date    CHOL 169 09/15/2020    CHOL 160 10/03/2019    TRIG 197 (H) 09/15/2020    TRIG 147 10/03/2019    HDL 57 05/11/2022    HDL 51 11/08/2021    LDLCALC 65 05/11/2022    LDLCALC 61 11/08/2021    LDLDIRECT 80 09/15/2020     Lab Results   Component Value Date    ALT 14 05/11/2022    ALT 14 11/08/2021    AST 24 05/11/2022    AST 25 11/08/2021     BMP:    Lab Results   Component Value Date     05/11/2022     11/08/2021    K 5.5 05/11/2022    K 5.5 11/08/2021     05/11/2022    CL 99 11/08/2021    CO2 22 05/11/2022    CO2 24 11/08/2021    BUN 25 05/11/2022    BUN 19 11/08/2021    CREATININE 1.2 05/11/2022    CREATININE 1.1 11/08/2021     CMP:   Lab Results   Component Value Date     05/11/2022     11/08/2021    K 5.5 05/11/2022    K 5.5 11/08/2021     05/11/2022    CL 99 11/08/2021    CO2 22 05/11/2022    CO2 24 11/08/2021    BUN 25 05/11/2022    BUN 19 11/08/2021    CREATININE 1.2 05/11/2022    CREATININE 1.1 11/08/2021    PROT 7.2 05/11/2022 PROT 7.3 2021    PROT 7.5 2013    PROT 7.3 06/15/2012     TSH:    Lab Results   Component Value Date    TSH 4.62 2021    TSH 3.36 2019    TSHHS 2.676 2010       Smoke: What:                           How much:    Alcohol: How Much:     Caffeine: Pop:         Tea:            Coffee:                Chocolate:    Exercise:    Last Visit:2021  Complaints:Reportedly had COVID middle of October. Still has congestion & cough  Changes:None this visit    LAST EK2021    VENOUS DOPPLER: NONE    HOLTER/ EVENT MONITOR: NONE    STRESS TEST:  2014    ECHO: 2021  Left ventricular function is low normal, EF is estimated at 50-55%. Mild left ventricular hypertrophy. Normal diastolic filling pattern for age. Mildly dilated left atrium. Mild mitral and tricuspid regurgitation is present. RVSP is 28 mmHg. No evidence of pericardial effusion. CAROTID: NONE    MUGA: NONE    LAST PACER CHECK: NONE    CARDIAC CATH: 2021  Indication: Crescendo Angina. Results:   Most significant stenosis is in RCA, it is 50 to 60 % proximal   stenosis. Normal LV systolic function. LVEF is 50 to 55 %. Patient tolerated the procedure well. Blood loss < 10 %. No immediate complications. Amio Protocol:    CHADS: [id number] Score for Atrial Fibrillation Stroke Risk   Risk   Factors  Component Value   C CHF No 0   H HTN Yes 1   A2 Age >= 76 Yes,  (80 y.o.) 2   D DM Yes 1   S2 Prior Stroke/TIA No 0   V Vascular Disease No 0   A Age 74-69 No,  (80 y.o.) 0   Sc Sex male 0    [id number]  Score  4   Score last updated 22 0:14 AM EDT    Click here for a link to the UpToDate guideline \"Atrial Fibrillation: Anticoagulation therapy to prevent embolization    Disclaimer: Risk Score calculation is dependent on accuracy of patient problem list and past encounter diagnosis.

## 2022-05-23 RX ORDER — ATORVASTATIN CALCIUM 40 MG/1
40 TABLET, FILM COATED ORAL DAILY
Qty: 30 TABLET | Refills: 5 | Status: SHIPPED | OUTPATIENT
Start: 2022-05-23

## 2022-05-26 RX ORDER — METFORMIN HYDROCHLORIDE 500 MG/1
500 TABLET, EXTENDED RELEASE ORAL
Qty: 90 TABLET | Refills: 1 | Status: SHIPPED | OUTPATIENT
Start: 2022-05-26

## 2022-06-02 RX ORDER — TRIAMCINOLONE ACETONIDE 1 MG/G
CREAM TOPICAL
Qty: 45 G | Refills: 1 | Status: SHIPPED | OUTPATIENT
Start: 2022-06-02

## 2022-06-30 RX ORDER — ISOSORBIDE MONONITRATE 30 MG/1
30 TABLET, EXTENDED RELEASE ORAL DAILY
Qty: 90 TABLET | Refills: 3 | Status: SHIPPED | OUTPATIENT
Start: 2022-06-30

## 2022-07-05 DIAGNOSIS — E03.4 HYPOTHYROIDISM DUE TO ACQUIRED ATROPHY OF THYROID: ICD-10-CM

## 2022-07-05 RX ORDER — LEVOTHYROXINE SODIUM 0.05 MG/1
TABLET ORAL
Qty: 90 TABLET | Refills: 1 | Status: SHIPPED | OUTPATIENT
Start: 2022-07-05

## 2022-08-17 ENCOUNTER — OFFICE VISIT (OUTPATIENT)
Dept: INTERNAL MEDICINE CLINIC | Age: 82
End: 2022-08-17
Payer: MEDICARE

## 2022-08-17 VITALS
WEIGHT: 203 LBS | BODY MASS INDEX: 29.06 KG/M2 | HEIGHT: 70 IN | SYSTOLIC BLOOD PRESSURE: 115 MMHG | HEART RATE: 74 BPM | OXYGEN SATURATION: 98 % | DIASTOLIC BLOOD PRESSURE: 74 MMHG

## 2022-08-17 DIAGNOSIS — E03.4 HYPOTHYROIDISM DUE TO ACQUIRED ATROPHY OF THYROID: ICD-10-CM

## 2022-08-17 DIAGNOSIS — E78.2 MIXED HYPERLIPIDEMIA: ICD-10-CM

## 2022-08-17 DIAGNOSIS — Z86.16 HISTORY OF COVID-19: ICD-10-CM

## 2022-08-17 DIAGNOSIS — G47.00 INSOMNIA, UNSPECIFIED TYPE: ICD-10-CM

## 2022-08-17 DIAGNOSIS — I25.119 CORONARY ARTERY DISEASE INVOLVING NATIVE CORONARY ARTERY OF NATIVE HEART WITH ANGINA PECTORIS (HCC): Primary | ICD-10-CM

## 2022-08-17 DIAGNOSIS — E11.9 TYPE 2 DIABETES MELLITUS WITHOUT COMPLICATION, WITHOUT LONG-TERM CURRENT USE OF INSULIN (HCC): ICD-10-CM

## 2022-08-17 DIAGNOSIS — E55.9 VITAMIN D DEFICIENCY: ICD-10-CM

## 2022-08-17 DIAGNOSIS — J30.9 ALLERGIC RHINITIS, UNSPECIFIED SEASONALITY, UNSPECIFIED TRIGGER: ICD-10-CM

## 2022-08-17 DIAGNOSIS — Z72.0 TOBACCO ABUSE: ICD-10-CM

## 2022-08-17 PROBLEM — M31.6 GIANT CELL ARTERITIS (HCC): Status: RESOLVED | Noted: 2017-10-17 | Resolved: 2022-08-17

## 2022-08-17 LAB
CHP ED QC CHECK: NORMAL
GLUCOSE BLD-MCNC: 140 MG/DL

## 2022-08-17 PROCEDURE — G8427 DOCREV CUR MEDS BY ELIG CLIN: HCPCS | Performed by: INTERNAL MEDICINE

## 2022-08-17 PROCEDURE — 4004F PT TOBACCO SCREEN RCVD TLK: CPT | Performed by: INTERNAL MEDICINE

## 2022-08-17 PROCEDURE — 1123F ACP DISCUSS/DSCN MKR DOCD: CPT | Performed by: INTERNAL MEDICINE

## 2022-08-17 PROCEDURE — G8417 CALC BMI ABV UP PARAM F/U: HCPCS | Performed by: INTERNAL MEDICINE

## 2022-08-17 PROCEDURE — 3044F HG A1C LEVEL LT 7.0%: CPT | Performed by: INTERNAL MEDICINE

## 2022-08-17 PROCEDURE — 82962 GLUCOSE BLOOD TEST: CPT | Performed by: INTERNAL MEDICINE

## 2022-08-17 PROCEDURE — 99214 OFFICE O/P EST MOD 30 MIN: CPT | Performed by: INTERNAL MEDICINE

## 2022-08-17 ASSESSMENT — PATIENT HEALTH QUESTIONNAIRE - PHQ9
SUM OF ALL RESPONSES TO PHQ QUESTIONS 1-9: 0
2. FEELING DOWN, DEPRESSED OR HOPELESS: 0
SUM OF ALL RESPONSES TO PHQ QUESTIONS 1-9: 0
SUM OF ALL RESPONSES TO PHQ9 QUESTIONS 1 & 2: 0
SUM OF ALL RESPONSES TO PHQ QUESTIONS 1-9: 0
1. LITTLE INTEREST OR PLEASURE IN DOING THINGS: 0
SUM OF ALL RESPONSES TO PHQ QUESTIONS 1-9: 0

## 2022-08-17 NOTE — PROGRESS NOTES
Name: Radn Holman  [id number]  Age: 80 y.o. YOB: 1940  Sex: male    CHIEF COMPLAINT:    Chief Complaint   Patient presents with    Diabetes    Other     Other chronic conditions         HISTORY OF PRESENT ILLNESS:     This is a pleasant  80 y.o. male  is seen today for management of chronic medical problems and medications refills. Previous records reviewed . Doing OK . Dizziness has improved. But still  complains of getting tired easily. Denies CP or SOB. Being followed by Dr. Elvin Jones periodically for his cardiac problems. No fever , sore throat or cough or congestion. Denies any abdominal pain. Appetite OK. Bowels moving [place]. No urinary symptoms. Denies any significant arthritis. Hearing is ok. Vision Ok with glasses. Denies  any significant skin lesions. Denies any significant depression or anxiety. Patient claims that he does not check blood sugars at home and he quit doing it because his blood sugars were normal most of the time. He sees Dr. Vineet Martinez periodically. He had a history of shingles in his right eye 5 + years ago. No other complaints.   He has been fully vaccinated for COVID-19 and also boosted x 2    Past Medical History:    Patient Active Problem List   Diagnosis    Type 2 diabetes mellitus ([address]    Hyperlipidemia    Diverticulosis    Hx of adenomatous colonic polyps    Family history of prostate cancer    Anxiety    Dyspnea    History of 24 hour EKG monitoring    Shingles    Insomnia    Vertigo    Allergic rhinitis    Need for Tdap vaccination    Precordial pain    Coronary artery disease involving native coronary artery of native heart with angina pectoris ([address]    Tobacco abuse    Fatigue    History of COVID-19    Vitamin D deficiency        Past Surgical History:        Procedure Laterality Date    COLONOSCOPY  2012    polypodectomy     HEMICOLECTOMY  5/06    right    INGUINAL HERNIA REPAIR      MOHS SURGERY      resection       Social History:   Social History     Tobacco Use    Smoking status: Every Day     Packs/day: 0.25     Years: 63.00     Pack years: 15.75     Types: Cigarettes     Start date: 1/1/1955    Smokeless tobacco: Never   Substance Use Topics    Alcohol use: No     Comment: stopped alcohol in 1991, drinks 3 to 4 cups of coffee per day       Family History:       Problem Relation Age of Onset    Heart Disease Mother     Cancer Father     Cancer Brother        Allergies:  Buspar [buspirone hcl], Codeine, and Shrimp flavor    Current Medications :      Prior to Admission medications    Medication Sig Start Date End Date Taking? Authorizing Provider   levothyroxine (SYNTHROID) 50 MCG tablet TAKE 1 TABLET BY MOUTH EVERY DAY 7/5/22  Yes Srinivasan Flores MD   isosorbide mononitrate (IMDUR) 30 MG extended release tablet Take 1 tablet by mouth daily 6/30/22  Yes Mary Pichardo MD   triamcinolone (KENALOG) 0.1 % cream Apply topically 2-4 times daily. 6/2/22  Yes Srinivasan Flores MD   metFORMIN (GLUCOPHAGE-XR) 500 mg extended release tablet Take 1 tablet by mouth daily (with breakfast) 5/26/22  Yes Colton Subramanian MD   atorvastatin (LIPITOR) 40 MG tablet Take 1 tablet by mouth daily 5/23/22  Yes Mary Pichardo MD   Multiple Vitamins-Minerals (THERAPEUTIC MULTIVITAMIN-MINERALS) tablet Take 1 tablet by mouth daily 5/11/22 5/11/23 Yes Srinivasan Flores MD   montelukast (SINGULAIR) 10 MG tablet TAKE 1 TABLET BY MOUTH EVERY DAY 4/4/22  Yes Srinivasan Flores MD   fluticasone (FLONASE) 50 MCG/ACT nasal spray 2 sprays by Each Nostril route daily 7/21/21  Yes Srinivasan Flores MD   meclizine (ANTIVERT) 12.5 MG tablet Take 1 tablet by mouth 3 times daily as needed for Dizziness 4/21/21  Yes Srinivasan Flores MD   cetirizine (ZYRTEC) 5 MG tablet Take 1 tablet by mouth daily as needed for Allergies or Rhinitis 4/21/21  Yes Srinivasan Flores MD   aspirin 81 MG tablet Take 81 mg by mouth daily. Yes Historical Provider, MD   Cholecalciferol (VITAMIN D3) 2000 UNIT CAPS Take  by mouth daily.

## 2022-09-01 DIAGNOSIS — F41.9 ANXIETY: Primary | ICD-10-CM

## 2022-09-01 RX ORDER — LORAZEPAM 0.5 MG/1
0.5 TABLET ORAL NIGHTLY PRN
Qty: 10 TABLET | Refills: 0 | Status: SHIPPED | OUTPATIENT
Start: 2022-09-01 | End: 2022-10-01

## 2022-09-01 RX ORDER — LORAZEPAM 0.5 MG/1
0.5 TABLET ORAL NIGHTLY PRN
COMMUNITY
Start: 2022-06-21 | End: 2022-09-01 | Stop reason: SDUPTHER

## 2022-10-01 DIAGNOSIS — J30.9 ALLERGIC RHINITIS, UNSPECIFIED SEASONALITY, UNSPECIFIED TRIGGER: ICD-10-CM

## 2022-10-03 RX ORDER — MONTELUKAST SODIUM 10 MG/1
TABLET ORAL
Qty: 90 TABLET | Refills: 1 | Status: SHIPPED | OUTPATIENT
Start: 2022-10-03

## 2022-11-17 ENCOUNTER — OFFICE VISIT (OUTPATIENT)
Dept: INTERNAL MEDICINE CLINIC | Age: 82
End: 2022-11-17
Payer: MEDICARE

## 2022-11-17 VITALS
BODY MASS INDEX: 29.35 KG/M2 | OXYGEN SATURATION: 99 % | HEIGHT: 70 IN | HEART RATE: 63 BPM | WEIGHT: 205 LBS | DIASTOLIC BLOOD PRESSURE: 68 MMHG | SYSTOLIC BLOOD PRESSURE: 124 MMHG

## 2022-11-17 DIAGNOSIS — I25.119 CORONARY ARTERY DISEASE INVOLVING NATIVE CORONARY ARTERY OF NATIVE HEART WITH ANGINA PECTORIS (HCC): Primary | ICD-10-CM

## 2022-11-17 DIAGNOSIS — Z86.16 HISTORY OF COVID-19: ICD-10-CM

## 2022-11-17 DIAGNOSIS — E55.9 VITAMIN D DEFICIENCY: ICD-10-CM

## 2022-11-17 DIAGNOSIS — Z72.0 TOBACCO ABUSE: ICD-10-CM

## 2022-11-17 DIAGNOSIS — J30.9 ALLERGIC RHINITIS, UNSPECIFIED SEASONALITY, UNSPECIFIED TRIGGER: ICD-10-CM

## 2022-11-17 DIAGNOSIS — E78.2 MIXED HYPERLIPIDEMIA: ICD-10-CM

## 2022-11-17 DIAGNOSIS — E11.65 TYPE 2 DIABETES MELLITUS WITH HYPERGLYCEMIA, WITHOUT LONG-TERM CURRENT USE OF INSULIN (HCC): ICD-10-CM

## 2022-11-17 LAB
A/G RATIO: 1.4 (ref 1.1–2.2)
ALBUMIN SERPL-MCNC: 4.2 G/DL (ref 3.4–5)
ALP BLD-CCNC: 94 U/L (ref 40–129)
ALT SERPL-CCNC: 15 U/L (ref 10–40)
ANION GAP SERPL CALCULATED.3IONS-SCNC: 14 MMOL/L (ref 3–16)
AST SERPL-CCNC: 25 U/L (ref 15–37)
BASOPHILS ABSOLUTE: 0.1 K/UL (ref 0–0.2)
BASOPHILS RELATIVE PERCENT: 0.5 %
BILIRUB SERPL-MCNC: 0.6 MG/DL (ref 0–1)
BUN BLDV-MCNC: 25 MG/DL (ref 7–20)
CALCIUM SERPL-MCNC: 10 MG/DL (ref 8.3–10.6)
CHLORIDE BLD-SCNC: 100 MMOL/L (ref 99–110)
CHOLESTEROL, FASTING: 155 MG/DL (ref 0–199)
CHP ED QC CHECK: NORMAL
CO2: 24 MMOL/L (ref 21–32)
CREAT SERPL-MCNC: 1.3 MG/DL (ref 0.8–1.3)
EOSINOPHILS ABSOLUTE: 0.3 K/UL (ref 0–0.6)
EOSINOPHILS RELATIVE PERCENT: 2.6 %
GFR SERPL CREATININE-BSD FRML MDRD: 55 ML/MIN/{1.73_M2}
GLUCOSE BLD-MCNC: 104 MG/DL (ref 70–99)
GLUCOSE BLD-MCNC: 119 MG/DL
HCT VFR BLD CALC: 42.9 % (ref 40.5–52.5)
HDLC SERPL-MCNC: 54 MG/DL (ref 40–60)
HEMOGLOBIN: 13.9 G/DL (ref 13.5–17.5)
LDL CHOLESTEROL CALCULATED: 69 MG/DL
LYMPHOCYTES ABSOLUTE: 2.2 K/UL (ref 1–5.1)
LYMPHOCYTES RELATIVE PERCENT: 22.2 %
MCH RBC QN AUTO: 31.8 PG (ref 26–34)
MCHC RBC AUTO-ENTMCNC: 32.5 G/DL (ref 31–36)
MCV RBC AUTO: 97.9 FL (ref 80–100)
MONOCYTES ABSOLUTE: 0.7 K/UL (ref 0–1.3)
MONOCYTES RELATIVE PERCENT: 7.3 %
NEUTROPHILS ABSOLUTE: 6.7 K/UL (ref 1.7–7.7)
NEUTROPHILS RELATIVE PERCENT: 67.4 %
PDW BLD-RTO: 14.3 % (ref 12.4–15.4)
PLATELET # BLD: 299 K/UL (ref 135–450)
PMV BLD AUTO: 9.2 FL (ref 5–10.5)
POTASSIUM SERPL-SCNC: 5.8 MMOL/L (ref 3.5–5.1)
RBC # BLD: 4.38 M/UL (ref 4.2–5.9)
SODIUM BLD-SCNC: 138 MMOL/L (ref 136–145)
TOTAL PROTEIN: 7.1 G/DL (ref 6.4–8.2)
TRIGLYCERIDE, FASTING: 160 MG/DL (ref 0–150)
VLDLC SERPL CALC-MCNC: 32 MG/DL
WBC # BLD: 10 K/UL (ref 4–11)

## 2022-11-17 PROCEDURE — 82962 GLUCOSE BLOOD TEST: CPT | Performed by: INTERNAL MEDICINE

## 2022-11-17 PROCEDURE — 3044F HG A1C LEVEL LT 7.0%: CPT | Performed by: INTERNAL MEDICINE

## 2022-11-17 PROCEDURE — G8427 DOCREV CUR MEDS BY ELIG CLIN: HCPCS | Performed by: INTERNAL MEDICINE

## 2022-11-17 PROCEDURE — G8484 FLU IMMUNIZE NO ADMIN: HCPCS | Performed by: INTERNAL MEDICINE

## 2022-11-17 PROCEDURE — G8417 CALC BMI ABV UP PARAM F/U: HCPCS | Performed by: INTERNAL MEDICINE

## 2022-11-17 PROCEDURE — 36415 COLL VENOUS BLD VENIPUNCTURE: CPT | Performed by: INTERNAL MEDICINE

## 2022-11-17 PROCEDURE — 4004F PT TOBACCO SCREEN RCVD TLK: CPT | Performed by: INTERNAL MEDICINE

## 2022-11-17 PROCEDURE — 99214 OFFICE O/P EST MOD 30 MIN: CPT | Performed by: INTERNAL MEDICINE

## 2022-11-17 PROCEDURE — 1123F ACP DISCUSS/DSCN MKR DOCD: CPT | Performed by: INTERNAL MEDICINE

## 2022-11-17 NOTE — PROGRESS NOTES
Name: Emiliano Damian  [id number]  Age: 80 y.o. YOB: 1940  Sex: male    CHIEF COMPLAINT:    Chief Complaint   Patient presents with    Diabetes    Hyperlipidemia    Other     Other chronic conditions         HISTORY OF PRESENT ILLNESS:     This is a pleasant  80 y.o. male  is seen today for management of chronic medical problems and medications refills. Previous records reviewed . Doing OK . Dizziness has improved. But complains of getting tired easily. Does not do much exercise. Denies CP or SOB. Being followed by Dr. William Pinto periodically for his cardiac problems. No fever , sore throat or cough or congestion. Denies any abdominal pain. Appetite OK. Bowels moving Ltanya Pill. Occasionally gets constipated rarely. No urinary symptoms. Denies any significant arthritis. Hearing is ok. Vision Ok with glasses. Denies  any significant skin lesions. Denies any significant depression or anxiety. Patient claims that he does not check blood sugars at home and he quit doing it because his blood sugars were normal most of the time. No other complaints. He has been fully vaccinated for COVID-19 and also boosted x2. .Will a Bivalent vaccine soon. Will get a Flu shot at a Pharmacy.       Past Medical History:    Patient Active Problem List   Diagnosis    Type 2 diabetes mellitus ([address]    Hyperlipidemia    Diverticulosis    Hx of adenomatous colonic polyps    Family history of prostate cancer    Anxiety    Dyspnea    History of 24 hour EKG monitoring    Shingles    Insomnia    Vertigo    Allergic rhinitis    Need for Tdap vaccination    Precordial pain    Coronary artery disease involving native coronary artery of native heart with angina pectoris ([address]    Tobacco abuse    Fatigue    History of COVID-19    Vitamin D deficiency        Past Surgical History:        Procedure Laterality Date    COLONOSCOPY  2012    polypodectomy     HEMICOLECTOMY  5/06    right    INGUINAL HERNIA REPAIR      MOHS SURGERY      resection       Social History:   Social History     Tobacco Use    Smoking status: Every Day     Packs/day: 0.25     Years: 63.00     Pack years: 15.75     Types: Cigarettes     Start date: 1/1/1955    Smokeless tobacco: Never   Substance Use Topics    Alcohol use: No     Comment: stopped alcohol in 1991, drinks 3 to 4 cups of coffee per day       Family History:       Problem Relation Age of Onset    Heart Disease Mother     Cancer Father     Cancer Brother        Allergies:  Buspar [buspirone hcl], Codeine, and Shrimp flavor    Current Medications :      Prior to Admission medications    Medication Sig Start Date End Date Taking? Authorizing Provider   montelukast (SINGULAIR) 10 MG tablet TAKE 1 TABLET BY MOUTH EVERY DAY 10/3/22  Yes Jonathon Gibbs MD   levothyroxine (SYNTHROID) 50 MCG tablet TAKE 1 TABLET BY MOUTH EVERY DAY 7/5/22  Yes Jonathon Gibbs MD   isosorbide mononitrate (IMDUR) 30 MG extended release tablet Take 1 tablet by mouth daily 6/30/22  Yes Anisha Paige MD   triamcinolone (KENALOG) 0.1 % cream Apply topically 2-4 times daily. 6/2/22  Yes Jonathon Gibbs MD   metFORMIN (GLUCOPHAGE-XR) 500 mg extended release tablet Take 1 tablet by mouth daily (with breakfast) 5/26/22  Yes Mark Headley MD   atorvastatin (LIPITOR) 40 MG tablet Take 1 tablet by mouth daily 5/23/22  Yes Anisha Paige MD   Multiple Vitamins-Minerals (THERAPEUTIC MULTIVITAMIN-MINERALS) tablet Take 1 tablet by mouth daily 5/11/22 5/11/23 Yes Jonathon Gibbs MD   fluticasone Erbgabriel Doherty) 50 MCG/ACT nasal spray 2 sprays by Each Nostril route daily 7/21/21  Yes Jonathon Gibbs MD   meclizine (ANTIVERT) 12.5 MG tablet Take 1 tablet by mouth 3 times daily as needed for Dizziness 4/21/21  Yes Jonathon Gibbs MD   cetirizine (ZYRTEC) 5 MG tablet Take 1 tablet by mouth daily as needed for Allergies or Rhinitis 4/21/21  Yes Jonathon Gibbs MD   aspirin 81 MG tablet Take 81 mg by mouth daily.    Yes Historical Provider, MD Cholecalciferol (VITAMIN D3) 2000 UNIT CAPS Take  by mouth daily. Yes Historical Provider, MD   Coenzyme Q-10 50 MG TABS Take  by mouth daily. Yes Historical Provider, MD   calcium-vitamin D (OSCAL-500) 500-200 MG-UNIT per tablet Take 1 tablet by mouth 2 times daily. Yes Historical Provider, MD       LAB DATA: Reviewed. REVIEW OF SYSTEMS:   see HPI/ Comprehensive review of systems negative except for the ones mentioned in HPI. PHYSICAL EXAMINATION:   /68 (Site: Left Upper Arm, Position: Sitting, Cuff Size: Medium Adult)   Pulse 63   Ht 5' 10\" (1.778 m)   Wt 205 lb (93 kg)   SpO2 99%   BMI 29.41 kg/m²      GENERAL APPEARANCE:      Alert, oriented x 3, well developed, cooperative, not in any distress, appears stated age. HEAD:                         Normocephalic, atraumatic   EYES:                          PERRLA, EOMI, lids normal, conjuctivea clear, sclera anicteric. NECK:                         Supple, symmetrical,  trachea midline, no thyromegaly, no JVD, no lymphadenopathy. LUNGS:                       Clear to auscultation bilaterally, respirations unlabored, accessory muscles are not used. HEART:                       Regular rate and rhythm, S1 and S2 normal, no murmur, rub or gallop. PMI in MCL. ABDOMEN:                 Soft, non-tender, bowel sounds are normoactive, no masses, no hepatospleenomegaly. EXTREMITY:              no bipedal edema  NEURO:                      Alert, oriented to person, place and time. Grossly intact. Musculoskeletal:         No kyphosis or scoliosis, no deformity in any extremity noted, muscle strength and tone are normal.  Skin:                            Warm and dry. No rash or obvious suspicious lesions. PSYCH:                       Mood euthymic, insight and judgement good. ASSESSMENT/PLAN:    1.  Coronary artery disease involving native coronary artery of native heart with angina pectoris (HCC)  Continue aspirin, Imdur, Lipitor and continue to follow with his cardiologist as per his recommendations. - Comprehensive Metabolic Panel  - CBC with Auto Differential    2. Mixed hyperlipidemia  Patient is taking cholesterol medications regularly. Denies any side effects. Diet and exercise advised. Continue Lipitor  - Comprehensive Metabolic Panel  - Lipid, Fasting    3. Type 2 diabetes mellitus with hyperglycemia, without long-term current use of insulin (HCC)  Continue Glucophage and continue low sugar diet and exercise  - POCT Glucose    4. Allergic rhinitis, unspecified seasonality, unspecified trigger  Patient on Singulair, Flonase and Zyrtec as needed    5. Vitamin D deficiency  Continue vitamin D3    6. Tobacco abuse  Advised to stop smoking completely. 7. History of COVID-19  Improved    Advised to continue to follow COVID-19 precautions        Care discussed with patient. Questions answered and patient verbalizes understanding and agrees with plan. Medications reviewed and reconciled. Continue current medications. Appropriate prescriptions are ordered. Risks and benefits of meds are discussed. After visit summary provided. Advised to call for any problems, questions, or concerns. If symptoms worsen or don't improve as expected, to call us or go to ER. Follow up as directed, sooner if needed. Return in about 3 months (around 2/17/2023). This dictation was performed with a verbal recognition program and it was checked for errors. It is possible that there are still dictated errors within this office note. Any errors should be brought immediately to my attention for correction. All efforts were made to ensure that this office note is accurate.      Justin Perez MD MD

## 2022-11-18 DIAGNOSIS — E87.5 HYPERKALEMIA: ICD-10-CM

## 2022-11-18 DIAGNOSIS — N18.31 STAGE 3A CHRONIC KIDNEY DISEASE (HCC): Primary | ICD-10-CM

## 2022-11-18 RX ORDER — SODIUM POLYSTYRENE SULFONATE 15 G/60ML
15 SUSPENSION ORAL; RECTAL ONCE
Qty: 240 ML | Refills: 3 | Status: SHIPPED | OUTPATIENT
Start: 2022-11-18 | End: 2022-11-18

## 2022-11-22 ENCOUNTER — PATIENT MESSAGE (OUTPATIENT)
Dept: INTERNAL MEDICINE CLINIC | Age: 82
End: 2022-11-22

## 2022-11-28 NOTE — TELEPHONE ENCOUNTER
Script was  sent on 11/18/2022 to [facility].  Check with other pharmacies if available. Also can keep appointment with his nephrologist tomorrow for further recommendations.

## 2022-12-01 ENCOUNTER — HOSPITAL ENCOUNTER (OUTPATIENT)
Age: 82
Discharge: HOME | End: 2022-12-01
Payer: MEDICARE

## 2022-12-01 ENCOUNTER — HOSPITAL ENCOUNTER (OUTPATIENT)
Dept: ULTRASOUND IMAGING | Age: 82
Discharge: HOME | End: 2022-12-01
Payer: MEDICARE

## 2022-12-01 DIAGNOSIS — N18.30 STAGE 3 CHRONIC KIDNEY DISEASE, UNSPECIFIED WHETHER STAGE 3A OR 3B CKD (HCC): ICD-10-CM

## 2022-12-01 DIAGNOSIS — E87.5 HYPERKALEMIA: ICD-10-CM

## 2022-12-01 LAB
ANION GAP SERPL CALCULATED.3IONS-SCNC: 12 MMOL/L (ref 4–16)
BUN BLDV-MCNC: 21 MG/DL (ref 6–23)
CALCIUM SERPL-MCNC: 9.6 MG/DL (ref 8.3–10.6)
CHLORIDE BLD-SCNC: 100 MMOL/L (ref 99–110)
CO2: 26 MMOL/L (ref 21–32)
CREAT SERPL-MCNC: 1 MG/DL (ref 0.9–1.3)
CREATININE URINE: 103 MG/DL (ref 39–259)
GFR SERPL CREATININE-BSD FRML MDRD: >60 ML/MIN/1.73M2
GLUCOSE BLD-MCNC: 102 MG/DL (ref 70–99)
POTASSIUM SERPL-SCNC: 4.7 MMOL/L (ref 3.5–5.1)
PROT/CREAT RATIO, UR: 0.1
SODIUM BLD-SCNC: 138 MMOL/L (ref 135–145)
URINE TOTAL PROTEIN: 9.9 MG/DL

## 2022-12-01 PROCEDURE — 80048 BASIC METABOLIC PNL TOTAL CA: CPT

## 2022-12-01 PROCEDURE — 84156 ASSAY OF PROTEIN URINE: CPT

## 2022-12-01 PROCEDURE — 76775 US EXAM ABDO BACK WALL LIM: CPT

## 2022-12-01 PROCEDURE — 36415 COLL VENOUS BLD VENIPUNCTURE: CPT

## 2022-12-01 PROCEDURE — 82570 ASSAY OF URINE CREATININE: CPT

## 2022-12-01 NOTE — RESULT ENCOUNTER NOTE
TELL PT:  POTASSIUM LEVEL IS GOOD AT 4.7  PLEASE DO ANOTHER BMP CLOSE TO NEXT VISIT,  (you can mail him order)  DID 27 Felisha Haywood?   THX

## 2022-12-16 ENCOUNTER — OFFICE VISIT (OUTPATIENT)
Dept: CARDIOLOGY CLINIC | Age: 82
End: 2022-12-16
Payer: MEDICARE

## 2022-12-16 VITALS
WEIGHT: 206 LBS | DIASTOLIC BLOOD PRESSURE: 68 MMHG | HEIGHT: 70 IN | HEART RATE: 72 BPM | BODY MASS INDEX: 29.49 KG/M2 | SYSTOLIC BLOOD PRESSURE: 130 MMHG

## 2022-12-16 DIAGNOSIS — E78.2 MIXED HYPERLIPIDEMIA: ICD-10-CM

## 2022-12-16 DIAGNOSIS — Z72.0 TOBACCO ABUSE: ICD-10-CM

## 2022-12-16 DIAGNOSIS — Z86.16 HISTORY OF COVID-19: ICD-10-CM

## 2022-12-16 DIAGNOSIS — I25.119 CORONARY ARTERY DISEASE INVOLVING NATIVE CORONARY ARTERY OF NATIVE HEART WITH ANGINA PECTORIS (HCC): Primary | ICD-10-CM

## 2022-12-16 DIAGNOSIS — E11.65 TYPE 2 DIABETES MELLITUS WITH HYPERGLYCEMIA, WITHOUT LONG-TERM CURRENT USE OF INSULIN (HCC): ICD-10-CM

## 2022-12-16 PROCEDURE — 1123F ACP DISCUSS/DSCN MKR DOCD: CPT | Performed by: INTERNAL MEDICINE

## 2022-12-16 PROCEDURE — 99213 OFFICE O/P EST LOW 20 MIN: CPT | Performed by: INTERNAL MEDICINE

## 2022-12-16 PROCEDURE — G8417 CALC BMI ABV UP PARAM F/U: HCPCS | Performed by: INTERNAL MEDICINE

## 2022-12-16 PROCEDURE — G8427 DOCREV CUR MEDS BY ELIG CLIN: HCPCS | Performed by: INTERNAL MEDICINE

## 2022-12-16 PROCEDURE — 3044F HG A1C LEVEL LT 7.0%: CPT | Performed by: INTERNAL MEDICINE

## 2022-12-16 PROCEDURE — 4004F PT TOBACCO SCREEN RCVD TLK: CPT | Performed by: INTERNAL MEDICINE

## 2022-12-16 PROCEDURE — G8484 FLU IMMUNIZE NO ADMIN: HCPCS | Performed by: INTERNAL MEDICINE

## 2022-12-16 NOTE — LETTER
[facility] [address]  [address] [address]  Phone: [phone]  Fax: [phone]    Da Stoll MD    December 16, 2022     Annalisa Freed MD  [place] [id number]    Patient: Katherine Gloria   MR Number: [id number]   YOB: 1940   Date of Visit: 12/16/2022       Dear Annalisa Freed:    Thank you for referring Jihan Tan to me for evaluation/treatment. Below are the relevant portions of my assessment and plan of care. If you have questions, please do not hesitate to call me. I look forward to following Farzad Romero along with you.     Sincerely,      Da Stoll MD

## 2022-12-16 NOTE — PROGRESS NOTES
OFFICE PROGRESS NOTES      Kate Bustillos is a 80 y.o. male who has    CHIEF COMPLAINT AS FOLLOWS:  CHEST PAIN: : Patient denies any C/O chest pains at this time. SOB: No C/O SOB at this time. .   LEG EDEMA: No leg edema   PALPITATIONS: Denies any C/O Palpitations   DIZZINESS: No C/O Dizziness   SYNCOPE: None   OTHER/ ADDITIONAL COMPLAINTS:                                     HPI: Patient is here for F/U on his CAD & Dyslipidemia problems. CAD: Patient has known CAD. Dyslipidemia: Patient has known mixed dyslipidemia. Has been treated with guideline recommended medical / physical/ diet therapy as stated below. Current Outpatient Medications   Medication Sig Dispense Refill    sodium polystyrene (SPS) 15 GM/60ML suspension Take 60 mLs by mouth once for 1 dose 240 mL 3    montelukast (SINGULAIR) 10 MG tablet TAKE 1 TABLET BY MOUTH EVERY DAY 90 tablet 1    levothyroxine (SYNTHROID) 50 MCG tablet TAKE 1 TABLET BY MOUTH EVERY DAY 90 tablet 1    isosorbide mononitrate (IMDUR) 30 MG extended release tablet Take 1 tablet by mouth daily 90 tablet 3    triamcinolone (KENALOG) 0.1 % cream Apply topically 2-4 times daily. 45 g 1    metFORMIN (GLUCOPHAGE-XR) 500 mg extended release tablet Take 1 tablet by mouth daily (with breakfast) 90 tablet 1    atorvastatin (LIPITOR) 40 MG tablet Take 1 tablet by mouth daily 30 tablet 5    Multiple Vitamins-Minerals (THERAPEUTIC MULTIVITAMIN-MINERALS) tablet Take 1 tablet by mouth daily 90 tablet 2    meclizine (ANTIVERT) 12.5 MG tablet Take 1 tablet by mouth 3 times daily as needed for Dizziness 30 tablet 1    cetirizine (ZYRTEC) 5 MG tablet Take 1 tablet by mouth daily as needed for Allergies or Rhinitis 30 tablet 3    aspirin 81 MG tablet Take 81 mg by mouth daily. Cholecalciferol (VITAMIN D3) 2000 UNIT CAPS Take  by mouth daily. Coenzyme Q-10 50 MG TABS Take  by mouth daily.         calcium-vitamin D (OSCAL-500) 500-200 MG-UNIT per tablet Take 1 tablet by mouth 2 times daily. fluticasone (FLONASE) 50 MCG/ACT nasal spray 2 sprays by Each Nostril route daily (Patient not taking: Reported on 12/16/2022) 1 Bottle 3     No current facility-administered medications for this visit. Allergies: Buspar [buspirone hcl], Codeine, and Shrimp flavor  Review of Systems:    Constitutional: Negative for diaphoresis and fatigue  Respiratory: Negative for shortness of breath  Cardiovascular: Negative for chest pain, dyspnea on exertion, claudication, edema, irregular heartbeat, murmur, palpitations or shortness of breath  Musculoskeletal: Negative for muscle pain, muscular weakness, negative for pain in arm and leg or swelling in foot and leg    Objective:  /68 (Site: Left Upper Arm, Position: Sitting, Cuff Size: Large Adult)   Pulse 72   Ht 5' 10\" (1.778 m)   Wt 206 lb (93.4 kg)   BMI 29.56 kg/m²   Wt Readings from Last 3 Encounters:   12/16/22 206 lb (93.4 kg)   11/29/22 204 lb 12.8 oz (92.9 kg)   11/17/22 205 lb (93 kg)     Body mass index is 29.56 kg/m². GENERAL - Alert, oriented, pleasant, in no apparent distress. EYES: No jaundice, no conjunctival pallor. Neck - Supple. No jugular venous distention noted. No carotid bruits. Cardiovascular - Normal S1 and S2 without obvious murmur or gallop. Extremities - No cyanosis, clubbing, or significant edema. Pulmonary - No respiratory distress. No wheezes or rales.       MEDICAL DECISION MAKING & DATA REVIEW:    Lab Review   No results found for: TROPONINT  No results found for: BNP, PROBNP  Lab Results   Component Value Date    INR 0.90 07/27/2021     Lab Results   Component Value Date    LABA1C 5.9 05/11/2022    LABA1C 5.9 11/08/2021     Lab Results   Component Value Date    WBC 10.0 11/17/2022    WBC 9.0 05/11/2022    HCT 42.9 11/17/2022    HCT 42.3 05/11/2022    MCV 97.9 11/17/2022    MCV 96.3 05/11/2022     11/17/2022     05/11/2022     Lab Results   Component Value Date    CHOL 169 09/15/2020    CHOL 160 10/03/2019    TRIG 197 (H) 09/15/2020    TRIG 147 10/03/2019    HDL 54 11/17/2022    HDL 57 05/11/2022    LDLCALC 69 11/17/2022    LDLCALC 65 05/11/2022    LDLDIRECT 80 09/15/2020     Lab Results   Component Value Date    ALT 15 11/17/2022    ALT 14 05/11/2022    AST 25 11/17/2022    AST 24 05/11/2022     BMP:    Lab Results   Component Value Date/Time     12/01/2022 12:49 PM     11/17/2022 02:36 PM    K 4.7 12/01/2022 12:49 PM    K 5.8 11/17/2022 02:36 PM     12/01/2022 12:49 PM     11/17/2022 02:36 PM    CO2 26 12/01/2022 12:49 PM    CO2 24 11/17/2022 02:36 PM    BUN 21 12/01/2022 12:49 PM    BUN 25 11/17/2022 02:36 PM    CREATININE 1.0 12/01/2022 12:49 PM    CREATININE 1.3 11/17/2022 02:36 PM     CMP:   Lab Results   Component Value Date/Time     12/01/2022 12:49 PM     11/17/2022 02:36 PM    K 4.7 12/01/2022 12:49 PM    K 5.8 11/17/2022 02:36 PM     12/01/2022 12:49 PM     11/17/2022 02:36 PM    CO2 26 12/01/2022 12:49 PM    CO2 24 11/17/2022 02:36 PM    BUN 21 12/01/2022 12:49 PM    BUN 25 11/17/2022 02:36 PM    CREATININE 1.0 12/01/2022 12:49 PM    CREATININE 1.3 11/17/2022 02:36 PM    PROT 7.1 11/17/2022 02:36 PM    PROT 7.2 05/11/2022 02:49 PM    PROT 7.5 01/02/2013 09:13 PM    PROT 7.3 06/15/2012 09:56 AM     Lab Results   Component Value Date/Time    TSH 4.62 11/08/2021 01:47 PM    TSH 3.36 03/26/2019 01:27 PM    TSHHS 2.676 11/03/2010 11:48 AM       QUALITY MEASURES REVIEWED:  1.CAD:Patient is taking anti platelet agent:Yes  2. DYSLIPIDEMIA: Patient is on cholesterol lowering medication:Yes   3. Beta-Blocker therapy for CAD, if prior Myocardial Infarction:No   4. Counselled regarding smoking cessation. No   Patient does not Smoke. 5.Anticoagulation therapy (for A.Fib) No   Does Not have A.Fib.  6.Discussed weight management strategies.     Assessment & Plan:  Primary / Secondary prevention is the goal by aggressive risk modification, healthy and therapeutic life style changes for cardiovascular risk reduction. CORONARY ARTERY DISEASE:Yes  clinically stable. Patient is on optimal medical regimen ( see medication list above )  Patient is currently  ?symptomatic from CAD. -changes in  treatment:  no,on ASA & iMDUR   -Testing ordered:  no  [facility] classification: 3  CATH 7/28/2021   Most significant stenosis is in RCA, it is 50 to 60 % proximal   stenosis. Normal LV systolic function. LVEF is 50 to 55 %. HTN:No History  VHD: NONE SIGNIFICANT. ECHO 7/26/2021   Left ventricular function is low normal, EF is estimated at 50-55%. Mild left ventricular hypertrophy. Normal diastolic filling pattern for age. Mildly dilated left atrium. Mild mitral and tricuspid regurgitation is present. RVSP is 28 mmHg. No evidence of pericardial effusion. DYSLIPIDEMIA: yes,   Patient's profile is at / near [address] current medical regimen wellyes, Takes Lipitor  See most recent Lab values in Labs section above. Diabetes mellitis:yes,   BS under good control yes,   Hgb A1c avilable yes,      TESTS ORDERED: none this visit     PREVIOUSLY ORDERED TESTS REVIEWED & DISCUSSED WITH THE PATIENT:     I personally reviewed & interpreted, all previously ordered tests as copied above. Latest Labs are pulled in to the note with dates. Labs, specially in Reference to Lipid profile, Cardiac testing in the form of Echo ( dated: ), stress tests ( dated: ) & other relevant cardiac testing reviewed with patient & recommendations made based on assessment of the results. Discussed role of Cardiac risk factors & effects + treatment of co morbidities with patient & advised accordingly. MEDICATIONS: List of medications patient is currently taking is reviewed in detail with the patient & family member present. Discussed any side effects or problems taking the medication. Recommend Continue present management & medications as listed. AFFIRMATION: I spent at least 20 minutes of time reviewing patient's history, previous & current medical problems & all Labs + testing. This includes chart prep even prior to the vosit. Various goals are discussed and multiple questions answered. Relevant concelling performed. Office follow up in six months.

## 2022-12-16 NOTE — PROGRESS NOTES
Vein \"LEG PROBLEM Questionnaire\"  Do you have prominent leg veins? Yes   Do you have any skin discoloration? No  Do you have any healed or active sores? No  Do you have swelling of the legs? No  Do you have a family history of varicose veins? No  Does your profession involve pro-longed        standing or heavy lifting? Yes, back then  7. Have you been fighting overweight problems? Yes  8. Do you have restless legs? Yes  9. Do you have any night time cramps? No  10. Do you have any of the following in your legs:        Weakness I  11. If Yes - Have they worn compression stockings Yes  12.  If they have worn compression stockings  Years

## 2022-12-16 NOTE — PATIENT INSTRUCTIONS
Please be informed that if you contact our office outside of normal business hours the physician on call cannot help with any scheduling or rescheduling issues, procedure instruction questions or any type of medication issue. We advise you for any urgent/emergency that you go to the nearest emergency room! PLEASE CALL OUR OFFICE DURING NORMAL BUSINESS HOURS    Monday - Friday   8 am to 5 pm    [facility] [address]: [phone]    [place]:  [phone]  **It is YOUR responsibilty to bring medication bottles and/or updated medication list to [address] [place] [address]. This will allow us to better serve you and all your healthcare needs**  [facility] Locations - No appointment necessary. Sites open Monday to Friday. Call your preferred location for test preparation, business hours and other information you need. [employer] accepts [employer]. [facility] [address] Barrie Galindo, [address] [facility]  Phone: [phone] [facility] [address] [facility]  [address]. [place], [address] [facility]  Phone: [phone]     Thank you for allowing us to care for you today! We want to ensure we can follow your treatment plan and we strive to give you the best outcomes and experience possible. If you ever have a life threatening emergency and call 911 - for an ambulance (EMS)   Our providers can only care for you at:   [facility] or [facility]. Even if you have someone take you or you drive yourself we can only care for you in a [facility] facility. Our providers are not setup at the other healthcare locations! CORONARY ARTERY DISEASE:Yes  clinically stable. Patient is on optimal medical regimen ( see medication list above )  Patient is currently  ?symptomatic from CAD.    -changes in  treatment:  no,on ASA & iMDUR   -Testing ordered:  no  [place] classification: 3  CATH 7/28/2021   Most significant stenosis is in RCA, it is 50 to 60 % proximal stenosis. Normal LV systolic function. LVEF is 50 to 55 %. HTN:No History  VHD: NONE SIGNIFICANT. ECHO 7/26/2021   Left ventricular function is low normal, EF is estimated at 50-55%. Mild left ventricular hypertrophy. Normal diastolic filling pattern for age. Mildly dilated left atrium. Mild mitral and tricuspid regurgitation is present. RVSP is 28 mmHg. No evidence of pericardial effusion. DYSLIPIDEMIA: yes,   Patient's profile is at / near [address] current medical regimen wellyes, Takes Lipitor  See most recent Lab values in Labs section above. Diabetes mellitis:yes,   BS under good control yes,   Hgb A1c avilable yes,      TESTS ORDERED: none this visit     PREVIOUSLY ORDERED TESTS REVIEWED & DISCUSSED WITH THE PATIENT:     I personally reviewed & interpreted, all previously ordered tests as copied above. Latest Labs are pulled in to the note with dates. Labs, specially in Reference to Lipid profile, Cardiac testing in the form of Echo ( dated: ), stress tests ( dated: ) & other relevant cardiac testing reviewed with patient & recommendations made based on assessment of the results. Discussed role of Cardiac risk factors & effects + treatment of co morbidities with patient & advised accordingly. MEDICATIONS: List of medications patient is currently taking is reviewed in detail with the patient & family member present. Discussed any side effects or problems taking the medication. Recommend Continue present management & medications as listed. AFFIRMATION: I spent at least 20 minutes of time reviewing patient's history, previous & current medical problems & all Labs + testing. This includes chart prep even prior to the vosit. Various goals are discussed and multiple questions answered. Relevant concelling performed. Office follow up in six months.

## 2022-12-26 DIAGNOSIS — E03.4 HYPOTHYROIDISM DUE TO ACQUIRED ATROPHY OF THYROID: ICD-10-CM

## 2022-12-27 RX ORDER — ATORVASTATIN CALCIUM 40 MG/1
40 TABLET, FILM COATED ORAL DAILY
Qty: 90 TABLET | Refills: 3 | Status: SHIPPED | OUTPATIENT
Start: 2022-12-27

## 2022-12-27 RX ORDER — LEVOTHYROXINE SODIUM 0.05 MG/1
TABLET ORAL
Qty: 90 TABLET | Refills: 1 | Status: SHIPPED | OUTPATIENT
Start: 2022-12-27

## 2023-01-05 LAB — DIABETIC RETINOPATHY: NEGATIVE

## 2023-02-02 RX ORDER — ATORVASTATIN CALCIUM 40 MG/1
40 TABLET, FILM COATED ORAL DAILY
Qty: 90 TABLET | Refills: 3 | OUTPATIENT
Start: 2023-02-02

## 2023-02-16 ENCOUNTER — OFFICE VISIT (OUTPATIENT)
Dept: INTERNAL MEDICINE CLINIC | Age: 83
End: 2023-02-16

## 2023-02-16 VITALS
WEIGHT: 209 LBS | HEIGHT: 70 IN | OXYGEN SATURATION: 96 % | BODY MASS INDEX: 29.92 KG/M2 | HEART RATE: 87 BPM | SYSTOLIC BLOOD PRESSURE: 133 MMHG | DIASTOLIC BLOOD PRESSURE: 80 MMHG

## 2023-02-16 DIAGNOSIS — I25.119 CORONARY ARTERY DISEASE INVOLVING NATIVE CORONARY ARTERY OF NATIVE HEART WITH ANGINA PECTORIS (HCC): ICD-10-CM

## 2023-02-16 DIAGNOSIS — E55.9 VITAMIN D DEFICIENCY: ICD-10-CM

## 2023-02-16 DIAGNOSIS — J30.9 ALLERGIC RHINITIS, UNSPECIFIED SEASONALITY, UNSPECIFIED TRIGGER: ICD-10-CM

## 2023-02-16 DIAGNOSIS — E78.2 MIXED HYPERLIPIDEMIA: ICD-10-CM

## 2023-02-16 DIAGNOSIS — Z72.0 TOBACCO ABUSE: ICD-10-CM

## 2023-02-16 DIAGNOSIS — R53.81 PHYSICAL DECONDITIONING: ICD-10-CM

## 2023-02-16 DIAGNOSIS — E11.65 TYPE 2 DIABETES MELLITUS WITH HYPERGLYCEMIA, WITHOUT LONG-TERM CURRENT USE OF INSULIN (HCC): Primary | ICD-10-CM

## 2023-02-16 DIAGNOSIS — E03.4 HYPOTHYROIDISM DUE TO ACQUIRED ATROPHY OF THYROID: ICD-10-CM

## 2023-02-16 DIAGNOSIS — Z86.16 HISTORY OF COVID-19: ICD-10-CM

## 2023-02-16 DIAGNOSIS — R53.83 FATIGUE, UNSPECIFIED TYPE: ICD-10-CM

## 2023-02-16 LAB
CHP ED QC CHECK: NORMAL
GLUCOSE BLD-MCNC: 127 MG/DL
HBA1C MFR BLD: 5.7 %

## 2023-02-16 RX ORDER — METFORMIN HYDROCHLORIDE 500 MG/1
500 TABLET, EXTENDED RELEASE ORAL
Qty: 90 TABLET | Refills: 1 | Status: SHIPPED | OUTPATIENT
Start: 2023-02-16

## 2023-02-16 RX ORDER — M-VIT,TX,IRON,MINS/CALC/FOLIC 27MG-0.4MG
1 TABLET ORAL DAILY
Qty: 90 TABLET | Refills: 1 | Status: SHIPPED | OUTPATIENT
Start: 2023-02-16 | End: 2024-02-16

## 2023-02-16 SDOH — ECONOMIC STABILITY: FOOD INSECURITY: WITHIN THE PAST 12 MONTHS, YOU WORRIED THAT YOUR FOOD WOULD RUN OUT BEFORE YOU GOT MONEY TO BUY MORE.: NEVER TRUE

## 2023-02-16 SDOH — ECONOMIC STABILITY: HOUSING INSECURITY
IN THE LAST 12 MONTHS, WAS THERE A TIME WHEN YOU DID NOT HAVE A STEADY PLACE TO SLEEP OR SLEPT IN A SHELTER (INCLUDING NOW)?: NO

## 2023-02-16 SDOH — ECONOMIC STABILITY: FOOD INSECURITY: WITHIN THE PAST 12 MONTHS, THE FOOD YOU BOUGHT JUST DIDN'T LAST AND YOU DIDN'T HAVE MONEY TO GET MORE.: NEVER TRUE

## 2023-02-16 SDOH — ECONOMIC STABILITY: INCOME INSECURITY: HOW HARD IS IT FOR YOU TO PAY FOR THE VERY BASICS LIKE FOOD, HOUSING, MEDICAL CARE, AND HEATING?: NOT HARD AT ALL

## 2023-02-16 ASSESSMENT — PATIENT HEALTH QUESTIONNAIRE - PHQ9
SUM OF ALL RESPONSES TO PHQ QUESTIONS 1-9: 0
SUM OF ALL RESPONSES TO PHQ9 QUESTIONS 1 & 2: 0
SUM OF ALL RESPONSES TO PHQ QUESTIONS 1-9: 0
SUM OF ALL RESPONSES TO PHQ QUESTIONS 1-9: 0
1. LITTLE INTEREST OR PLEASURE IN DOING THINGS: 0
SUM OF ALL RESPONSES TO PHQ QUESTIONS 1-9: 0
2. FEELING DOWN, DEPRESSED OR HOPELESS: 0

## 2023-02-16 NOTE — PROGRESS NOTES
Name: Alec Echevarria  [id number]  Age: 80 y.o. YOB: 1940  Sex: male    CHIEF COMPLAINT:    Chief Complaint   Patient presents with    Diabetes    Hyperlipidemia    Other     Other chronic conditions         HISTORY OF PRESENT ILLNESS:     This is a pleasant  80 y.o. male  is seen today for management of chronic medical problems and medications refills. Previous records reviewed . Doing OK . Dizziness has improved. But complains of getting tired easily. Does not do much exercise. Denies CP or SOB. Being followed by Dr. Betty Miller periodically for his cardiac problems. No fever , sore throat or cough or congestion. Denies any abdominal pain. Appetite OK. Bowels moving [place]. Occasionally gets constipated rarely. No urinary symptoms. Denies any significant arthritis. Hearing is ok. Vision Ok with glasses. Denies  any significant skin lesions. Denies any significant depression or anxiety. Patient claims that he does not check blood sugars at home and he quit doing it because his blood sugars were normal most of the time. No other new  complaints. He has been fully vaccinated for COVID-19 and also boosted x2. .. Did not get  a Flu shot this year. Today his blood sugar is 127 and A1c is 5.7.   His labs from 11/17/2022 were reviewed and explained to the patient    Past Medical History:    Patient Active Problem List   Diagnosis    Type 2 diabetes mellitus ([address]    Hyperlipidemia    Diverticulosis    Hx of adenomatous colonic polyps    Family history of prostate cancer    Anxiety    Dyspnea    History of 24 hour EKG monitoring    Shingles    Insomnia    Vertigo    Allergic rhinitis    Need for Tdap vaccination    Precordial pain    Coronary artery disease involving native coronary artery of native heart with angina pectoris ([address]    Tobacco abuse    Fatigue    History of COVID-19    Vitamin D deficiency        Past Surgical History:        Procedure Laterality Date    COLONOSCOPY 2012    polypodectomy     HEMICOLECTOMY  5/06    right    INGUINAL HERNIA REPAIR      MOHS SURGERY      resection       Social History:   Social History     Tobacco Use    Smoking status: Every Day     Packs/day: 0.25     Years: 63.00     Pack years: 15.75     Types: Cigarettes     Start date: 1/1/1955    Smokeless tobacco: Never   Substance Use Topics    Alcohol use: No     Comment: stopped alcohol in 1991, Caffiene - drinks 3 to 4 cups of coffee per day       Family History:       Problem Relation Age of Onset    Heart Disease Mother     Cancer Father     Cancer Brother        Allergies:  Buspar [buspirone hcl], Codeine, and Shrimp flavor    Current Medications :      Prior to Admission medications    Medication Sig Start Date End Date Taking? Authorizing Provider   metFORMIN (GLUCOPHAGE-XR) 500 MG extended release tablet Take 1 tablet by mouth daily (with breakfast) 2/16/23  Yes Curtis Choi MD   Multiple Vitamins-Minerals (THERAPEUTIC MULTIVITAMIN-MINERALS) tablet Take 1 tablet by mouth daily 2/16/23 2/16/24 Yes Curtis Choi MD   levothyroxine (SYNTHROID) 50 MCG tablet TAKE 1 TABLET BY MOUTH EVERY DAY 12/27/22  Yes Curtis Choi MD   atorvastatin (LIPITOR) 40 MG tablet Take 1 tablet by mouth daily 12/27/22  Yes Kristofer Lynne MD   sodium polystyrene (SPS) 15 GM/60ML suspension Take 60 mLs by mouth once for 1 dose 11/18/22 2/16/23 Yes Curtis Choi MD   montelukast (SINGULAIR) 10 MG tablet TAKE 1 TABLET BY MOUTH EVERY DAY 10/3/22  Yes Curtis Choi MD   isosorbide mononitrate (IMDUR) 30 MG extended release tablet Take 1 tablet by mouth daily 6/30/22  Yes Lauren Grandchild, MD   triamcinolone (KENALOG) 0.1 % cream Apply topically 2-4 times daily.  6/2/22  Yes Curtis Choi MD   meclizine (ANTIVERT) 12.5 MG tablet Take 1 tablet by mouth 3 times daily as needed for Dizziness 4/21/21  Yes Curtis Choi MD   cetirizine (ZYRTEC) 5 MG tablet Take 1 tablet by mouth daily as needed for Allergies or Rhinitis 4/21/21  Yes Lisa Vásquez MD   aspirin 81 MG tablet Take 81 mg by mouth daily. Yes Historical Provider, MD   Cholecalciferol (VITAMIN D3) 2000 UNIT CAPS Take  by mouth daily. Yes Historical Provider, MD   Coenzyme Q-10 50 MG TABS Take  by mouth daily. Yes Historical Provider, MD   calcium-vitamin D (OSCAL-500) 500-200 MG-UNIT per tablet Take 1 tablet by mouth 2 times daily. Yes Historical Provider, MD       LAB DATA: Reviewed. REVIEW OF SYSTEMS:   see HPI/ Comprehensive review of systems negative except for the ones mentioned in HPI. PHYSICAL EXAMINATION:   /80 (Site: Left Upper Arm, Position: Sitting, Cuff Size: Medium Adult)   Pulse 87   Ht 5' 10\" (1.778 m)   Wt 209 lb (94.8 kg)   SpO2 96%   BMI 29.99 kg/m²        GENERAL APPEARANCE:      Alert, oriented x 3, well developed, cooperative, not in any distress, appears stated age. HEAD:                         Normocephalic, atraumatic   EYES:                          PERRLA, EOMI, lids normal, conjuctivea clear, sclera anicteric. NECK:                         Supple, symmetrical,  trachea midline, no thyromegaly, no JVD, no lymphadenopathy. LUNGS:                       Clear to auscultation bilaterally, respirations unlabored, accessory muscles are not used. HEART:                       Regular rate and rhythm, S1 and S2 normal, no murmur, rub or gallop. PMI in MCL. ABDOMEN:                 Soft, non-tender, bowel sounds are normoactive, no masses, no hepatospleenomegaly. EXTREMITY:              no bipedal edema  NEURO:                      Alert, oriented to person, place and time. Grossly intact. Musculoskeletal:         No kyphosis or scoliosis, no deformity in any extremity noted, muscle strength and tone are normal.  Skin:                            Warm and dry. No rash or obvious suspicious lesions. PSYCH:                       Mood euthymic, insight and judgement good. ASSESSMENT/PLAN:    1. Type 2 diabetes mellitus with hyperglycemia, without long-term current use of insulin (HCC)  Advised to continue low sugar diet and exercise. Continue Glucophage.  - POCT Glucose  - POCT glycosylated hemoglobin (Hb A1C)  - metFORMIN (GLUCOPHAGE-XR) 500 MG extended release tablet; Take 1 tablet by mouth daily (with breakfast)  Dispense: 90 tablet; Refill: 1    2. Coronary artery disease involving native coronary artery of native heart with angina pectoris (HCC)  Continue aspirin, Imdur, Lipitor and continue to follow with his cardiologist as per his recommendations. 3. History of COVID-19  Improved. No residual  effects    4. Mixed hyperlipidemia  Patient is taking cholesterol medications regularly. Denies any side effects. Diet and exercise advised. Continue Lipitor    5. Allergic rhinitis, unspecified seasonality, unspecified trigger  Continue Singulair, Flonase and Zyrtec as needed    6. Vitamin D deficiency  Continue vitamin D3    7. Tobacco abuse  Patient spoke with son about 4 to 5 cigarettes a day. Advised extensively to quit smoking. Patient refused help to quit smoking. 8. Hypothyroidism due to acquired atrophy of thyroid  Continue Synthroid    9. Physical deconditioning  Refer to physical therapy  - [address]    10. Fatigue, unspecified type  Continue multivitamin and refer to physical therapy  - Multiple Vitamins-Minerals (THERAPEUTIC MULTIVITAMIN-MINERALS) tablet; Take 1 tablet by mouth daily  Dispense: 90 tablet; Refill: 1  - [facility] to continue to follow COVID-19 precautions    Care discussed with patient. Questions answered and patient verbalizes understanding and agrees with plan. Medications reviewed and reconciled. Continue current medications. Appropriate prescriptions are ordered. Risks and benefits of meds are discussed. After visit summary provided.     Advised to call for any problems, questions, or concerns. If symptoms worsen or don't improve as expected, to call us or go to ER. Follow up as directed, sooner if needed. Return in about 3 months (around 5/16/2023). This dictation was performed with a verbal recognition program and it was checked for errors. It is possible that there are still dictated errors within this office note. Any errors should be brought immediately to my attention for correction. All efforts were made to ensure that this office note is accurate.      Lion Friedman MD MD

## 2023-03-08 ENCOUNTER — HOSPITAL ENCOUNTER (OUTPATIENT)
Dept: PHYSICAL THERAPY | Age: 83
Setting detail: THERAPIES SERIES
Discharge: HOME | End: 2023-03-08
Payer: MEDICARE

## 2023-03-08 PROCEDURE — 97110 THERAPEUTIC EXERCISES: CPT

## 2023-03-08 PROCEDURE — 97161 PT EVAL LOW COMPLEX 20 MIN: CPT

## 2023-03-08 NOTE — FLOWSHEET NOTE
Outpatient Physical Therapy  [place]           [x] Phone: [phone]   Fax: [phone]  [place]           [] Phone: [phone]   Fax: [phone]        Physical Therapy Daily Treatment Note  Date:  3/8/2023    Patient Name:  Jenifer Doss    :  1940  MRN: [id number]  Restrictions/Precautions: No data recorded      Diagnosis:   Fatigue, unspecified type [R53.83]  Physical deconditioning [R53.81] Diagnosis: deconditioning, weakness, limited walking distance  Date of Injury/Surgery:   Treatment Diagnosis:  limited LE and core strength for ADL, stiars and walking activity  Insurance/Certification information: Wilmer Vaughn  Referring Physician:  Pro Maguire MD     PCP: Sarah Pérez MD  Next Doctor Visit:    Plan of care signed (Y/N):   no  Outcome Measure: LEFS 21 pts  Visit# / total visits:   -10  Pain level: 0/10   Goals:     Patient goals: walk and  trf sit to stand , stairs better and easier  Short term goals  Time Frame for Short term goals: 3-4 weeks  1. ind with HEP for LE strength, balance,  and enduracne  2. reports sit to stand 50 % easier with no UE support  3. SLB R and L  10 sec  4. 5 x sit to stand  12 sec or less wihtout UE support     Long Term Goals  Time Frame for Long Term Goals: 4-6 seeks  1. reports walking the grocery store or 1 mile with 75% less fatigue  2. MMT 4+/5 hip and knee B for better activity tolerance  3. Stairs 1 flight reciprocally with 1 rail  4. Tug improved to 9 sec or less  R min walk test 1200ft or more      Summary of Evaluation:  Assessment: Pt appears today with decreased LE strength and endurance. He appear to fatigue mainly due to LE fatigue rather than SOB. He is not able to walk as far or climb stairs or sit to stand as easily as he had a few years ago prior to getting several illness that caused him to be more sedentary.  Pt would benefit from skilled therapy interventions, including TE, gait and balance activity as needed to address listed impairments, progress toward goal completion and improve ADL/IADL status. PT also warranted to reduce risk for further injury or decline        Subjective:  See eval         Any changes in Ambulatory Summary Sheet? None        Objective:  See eval           Exercises: (No more than 4 columns)   Exercise/Equipment #1 3/8/23 Date Date           WARM UP                     TABLE                                       STANDING      marching  1*10 steps     Side stepping  1*10     Sit to stand  1*10     SLB  each side  2*20 sec effort                            PROPRIOCEPTION                                    MODALITIES                      Other Therapeutic Activities/Education:  Patient received education on their current pathology and how their condition effects them with their functional activities. Patient understood discussion and questions were answered. Patient understands their activity limitations and understands rational for treatment progression. Home Exercise Program:  Pt completed 1 set of HEP in clinic with instruction per HEP sheet dated today. Appeared to understand program. Any questions clarified. HEP  3/8/23 Marching , side stepping, SLB, sit to stand    Manual Treatments:        Modalities:        Communication with other providers:  cert sent  to  Carlee Parikh MD      Assessment:  (Response towards treatment session) (Pain Rating)  Assessment: Pt appears today with decreased LE strength and endurance. He appear to fatigue mainly due to LE fatigue rather than SOB. He is not able to walk as far or climb stairs or sit to stand as easily as he had a few years ago prior to getting several illness that caused him to be more sedentary. Pt would benefit from skilled therapy interventions, including TE, gait and balance activity as needed to address listed impairments, progress toward goal completion and improve ADL/IADL status.  PT also warranted to reduce risk for further injury or decline    Patient agrees with established plan of care and assisted in the development of their short term and long term goals. Patient had no adverse reaction with initial treatment and there are no barriers to learning. Demonstrates no mental or cognitive disorder.      Plan for Next Session:   Specific Instructions for Next Treatment: progress HEP as tolerated  Cont per POC LE strength and endurance, balance    Time In / Time Out:     1430 -1515       Timed Code/Total Treatment Minutes:   eval ( 30 )  TE (15)      Next Progress Note due:  10 visits or 4/8/23       Plan of Care Interventions:  [x] Therapeutic Exercise  [] Modalities:  [x] Therapeutic Activity     [] Ultrasound  [] Estim  [x] Gait Training      [] Cervical Traction [] Lumbar Traction  [x] Neuromuscular Re-education    [] Cold/hotpack [] Iontophoresis   [x] Instruction in HEP      [] Vasopneumatic   [] Dry Needling    [] Manual Therapy               [] Aquatic Therapy              Electronically signed by:  Barb Salinas PT, 3/8/2023, 3:48 PM

## 2023-03-08 NOTE — PROGRESS NOTES
Physical Therapy: Initial Evaluation    Patient: Brenda Telles (14 y.o. male)   Examination Date:   Plan of Care Certification Period: 3/8/2023 to        :  1940 ;    Confirmed: Yes MRN: [id number]  CSN: [id number]   Insurance: Payor: Sarah Wheeler / Plan: Taylor Prince / Product Type: *No Product type* /   Insurance ID: [id number] - (Medicare Managed) Secondary Insurance (if applicable):    Referring Physician: Marisel Harris MD     PCP: Librado Garrison MD Visits to Date/Visits Approved:   /      No Show/Cancelled Appts:   /       Medical Diagnosis: Fatigue, unspecified type [R53.83]  Physical deconditioning [R53.81] deconditioning, weakness, limited walking distance  Treatment Diagnosis: limited LE and core strength for ADL, stiars and walking activity     PERTINENT MEDICAL HISTORY   Patient Assessed for Rehabilitation Services: Yes  Self reported health status[de-identified] Fair    Medical History: Chart Reviewed: Yes   Past Medical History:   Diagnosis Date    Allergic rhinitis     Anxiety     Bradycardia     Cancer ([facility] [address] 2001    basal cell CA right preauricle    Colonic polyps     adenoma    Coronary artery disease     COVID-19 10/13/2021    Diabetes mellitus ([facility]     Diverticulosis     scattered    Dyspnea 2014    H/O cardiac catheterization 2021    Most significant stenosis is in RCA, it is 50 to 60 %.     Hyperlipidemia     Internal hemorrhoids 2012    Tubular adenoma of colon 2012    sigmoid    Vitamin D deficiency      Surgical History:   Past Surgical History:   Procedure Laterality Date    COLONOSCOPY      polypodectomy     HEMICOLECTOMY      right    INGUINAL HERNIA REPAIR      MOHS SURGERY      resection       Medications:   Current Outpatient Medications:     metFORMIN (GLUCOPHAGE-XR) 500 MG extended release tablet, Take 1 tablet by mouth daily (with breakfast), Disp: 90 tablet, Rfl: 1    Multiple Vitamins-Minerals (THERAPEUTIC MULTIVITAMIN-MINERALS) tablet, Take 1 tablet by mouth daily, Disp: 90 tablet, Rfl: 1    levothyroxine (SYNTHROID) 50 MCG tablet, TAKE 1 TABLET BY MOUTH EVERY DAY, Disp: 90 tablet, Rfl: 1    atorvastatin (LIPITOR) 40 MG tablet, Take 1 tablet by mouth daily, Disp: 90 tablet, Rfl: 3    sodium polystyrene (SPS) 15 GM/60ML suspension, Take 60 mLs by mouth once for 1 dose, Disp: 240 mL, Rfl: 3    montelukast (SINGULAIR) 10 MG tablet, TAKE 1 TABLET BY MOUTH EVERY DAY, Disp: 90 tablet, Rfl: 1    isosorbide mononitrate (IMDUR) 30 MG extended release tablet, Take 1 tablet by mouth daily, Disp: 90 tablet, Rfl: 3    triamcinolone (KENALOG) 0.1 % cream, Apply topically 2-4 times daily. , Disp: 45 g, Rfl: 1    meclizine (ANTIVERT) 12.5 MG tablet, Take 1 tablet by mouth 3 times daily as needed for Dizziness, Disp: 30 tablet, Rfl: 1    cetirizine (ZYRTEC) 5 MG tablet, Take 1 tablet by mouth daily as needed for Allergies or Rhinitis, Disp: 30 tablet, Rfl: 3    aspirin 81 MG tablet, Take 81 mg by mouth daily. , Disp: , Rfl:     Cholecalciferol (VITAMIN D3) 2000 UNIT CAPS, Take  by mouth daily. , Disp: , Rfl:     Coenzyme Q-10 50 MG TABS, Take  by mouth daily. , Disp: , Rfl:     calcium-vitamin D (OSCAL-500) 500-200 MG-UNIT per tablet, Take 1 tablet by mouth 2 times daily. , Disp: , Rfl:   Allergies: Buspar [buspirone hcl], Codeine, and Shrimp flavor      SUBJECTIVE EXAMINATION     History obtained from[de-identified] Patient, Chart Review,           Subjective History: Onset Date:  (gradual onset over several years and multiple illness)  Subjective: Pt states the he started to get more weeks a few years ago after having several general illnesses that limited his strength and walking ability , he notes that his breathing and lungs, hearth and kidneys are Ok per his Dr visits and he still is not getting stronger and still gets some chest tightness. He notes that he has become very sedentary and is mainly self limiting his activity.  He would like to get better and stronger with increased walking. He notes that stairs and sit to stand are getting more difficult also.   Additional Pertinent Hx (if applicable):     Prior diagnostic testing[de-identified]  (none)      Learning/Language: Learning  Does the patient/guardian have any barriers to learning?: No barriers  What is the preferred language of the patient/guardian?: English  How does the patient/guardian prefer to learn new concepts?: Listening, Reading, Demonstration, Pictures/Videos     Pain Screening   Pain Screening  Patient Currently in Pain:  (no pain reported)    Functional Status         Social History:  Social History  Lives With: Spouse, Son  Type of Home: House  Home Layout: One level, Laundry in basement  Home Access: Stairs to enter with rails  Entrance Stairs - Number of Steps: 3 steps    Occupation/Interests:  Occupation: Retired  Job Duties: Sedentary  [address]: reading    Prior Level of Function:  ind with ALD , walking at [employer], yard work - riding mower Independent        Current Level of Function:         IADL Comments: just very weak and limtied in his gait and stairs , as well as sit to stand       OBJECTIVE EXAMINATION       Review of Systems:  Vision: Impaired (scarring in R eye, not limiting)  Hearing:  ([place])       Ambulation/Gait (if applicable):  Ambulation  Quality of Gait: slightly slow with slighlty flexed psoture  Gait Deviations: Slow Anushka, Increased LORY, Decreased step length  Distance: household dist    Balance Screen:  Single Leg Stance  Right Leg Eyes Open: 3 seconds  Left Leg Eyes Open: 6 seconds      Left Strength  Right Strength         Strength LLE  L Hip Flexion: 4-/5  L Hip ABduction: 4-/5  L Hip ADduction: 4-/5  L Knee Flexion: 4-/5  L Knee Extension: 4-/5  L Ankle Dorsiflexion: 4-/5  L Ankle Plantar Flexion: 4-/5    Strength RLE  Strength RLE: Exception  R Hip Flexion: 4/5  R Hip ABduction: 4-/5  R Hip ADduction: 4-/5  R Knee Flexion: 4-/5  R Knee Extension: 4-/5  R Ankle Dorsiflexion: 4-/5  R Ankle Plantar flexion: 4-/5        Special Tests:    TUG  10.5 sec  5 x sit to stand 13.2 sec  6 min walk 1072 ft       ASSESSMENT     Impression: Assessment: Pt appears today with decreased LE strength and endurance. He appear to fatigue mainly due to LE fatigue rather than SOB. He is not able to walk as far or climb stairs or sit to stand as easily as he had a few years ago prior to getting several illness that caused him to be more sedentary. Pt would benefit from skilled therapy interventions, including TE, gait and balance activity as needed to address listed impairments, progress toward goal completion and improve ADL/IADL status. PT also warranted to reduce risk for further injury or decline    Body Structures, Functions, Activity Limitations Requiring Skilled Therapeutic Intervention: Decreased strength, Decreased vision/visual deficit, Decreased functional mobility     Statement of Medical Necessity: Physical Therapy is both indicated and medically necessary as outlined in the POC to increase the likelihood of meeting the functionally related goals stated below. Patient's Activity Tolerance:   mod fatigue noted with testing nad HEP edu    Patient's rehabilitation potential/prognosis is considered to be: Good    Factors which may impact rehabilitation potential include: None        GOALS   Patient Goal(s): walk and  trf sit to stand , stairs better and easier  Short Term Goals Completed by 3-4 weeks Goal Status   1. ind with HEP for LE strength, balance,  and enduracne     2. reports sit to stand 50 % easier with no UE support     3. SLB R and L  10 sec     4. 5 x sit to stand  12 sec or less wihtout UE support                                             Long Term Goals Completed by 4-6 seeks Goal Status   1. reports walking the grocery store or 1 mile with 75% less fatigue     2. MMT 4+/5 hip and knee B for better activity tolerance     3.  Stairs 1 flight reciprocally with 1 rail     4. Tug improved to 9 sec or less     R min walk test 1200ft or more                                        TREATMENT PLAN       Requires PT Follow-Up: Yes  Treatment Initiated : started on HEP  Specific Instructions for Next Treatment: progress HEP as tolerated    Pt. actively involved in establishing Plan of Care and Goals: Yes  Patient/ Caregiver education and instruction: Goals, PT Role, Plan of Care, Evaluative findings, Home Exercise Program safety with standing and walking exercise           Treatment may include any combination of the following: Current Treatment Recommendations: Strengthening, Balance training, Functional mobility training, Transfer training, Neuromuscular re-education, Gait training, Home exercise program     Frequency / Duration:  Patient to be seen 1 x per weeks for 6-8 weeks weeks      Eval Complexity: Overall Evaluation : Low  Decision Making: Low Complexity  History: Personal Factors and/or Comorbidities Impacting POC: Low  Examination of body system(s) including body structures and functions, activity limitations, and/or participation restrictions: Low  Clinical Presentation: Low    PT Treatment Completed:  Pt completed 1 set of HEP in clinic with instruction per HEP sheet dated today. Appeared to understand program. Any questions clarified. Therapy Time  Individual Time In:       1430  Individual Time Out:  1860  Minutes:   kelsie ( 30) TE ( 15)        Therapist Signature: Doreen Ventura, PT    Date: 1/8/5418     I certify that the above Therapy Services are being furnished while the patient is under my care. I agree with the treatment plan and certify that this therapy is necessary.       Physician's Signature:  ___________________________   Date:_______                                                                   José Miguel Cee MD        Physician Comments: _______________________________________________    Please sign and return to [facility]. Please fax to the location listed below.  T[facility] for this referral!    [address] [address]  Dept: [phone]  Dept Fax: [phone]  Loc: [phone]       POC NOTE

## 2023-03-08 NOTE — PLAN OF CARE
Outpatient Physical Therapy           [place]           [x] Phone: [phone]   Fax: [phone]  [place] Hendricks[place]           [] Phone: [phone]   Fax: [phone]     To: Lena Tabares MD     From: Larissa Easton, PT, PT     Patient: Deangelo Garcia       : 1940  Diagnosis: Fatigue, unspecified type [R53.83]  Physical deconditioning [R53.81] Diagnosis: deconditioning, weakness, limited walking distance  Treatment Diagnosis: limited LE and core strength for ADL, stiars and walking activity  Date: 3/8/2023    Physical Therapy Certification/Re-Certification Form  Dear Dr Lena Tabares MD  The following patient has been evaluated for physical therapy services and for therapy to continue, insurance requires physician review of the treatment plan initially and every 90 days. Please review the attached evaluation and/or summary of the patient's plan of care, and verify that you agree therapy should continue by signing the attached document and sending it back to our office. Assessment:    Assessment: Pt appears today with decreased LE strength and endurance. He appear to fatigue mainly due to LE fatigue rather than SOB. He is not able to walk as far or climb stairs or sit to stand as easily as he had a few years ago prior to getting several illness that caused him to be more sedentary. Pt would benefit from skilled therapy interventions, including TE, gait and balance activity as needed to address listed impairments, progress toward goal completion and improve ADL/IADL status.  PT also warranted to reduce risk for further injury or decline    Plan of Care/Treatment to date:  [x] Therapeutic Exercise  [] Modalities:  [x] Therapeutic Activity     [] Ultrasound  [] Electrical Stimulation  [x] Gait Training      [] Cervical Traction [] Lumbar Traction  [x] Neuromuscular Re-education    [] Cold/hotpack [] Iontophoresis   [x] Instruction in HEP      [] Vasopneumatic    [] Dry Needling  [] Manual Therapy [] Aquatic Therapy       Other:          Frequency/Duration:  # Days per week: [x] 1 day # Weeks: [] 1 week [] 5 weeks     [] 2 days   [] 2 weeks [x] 6 weeks     [] 3 days   [] 3 weeks [] 7 weeks     [] 4 days   [] 4 weeks [x] 8 weeks         [] 9 weeks [] 10 weeks         [] 11 weeks [] 12 weeks    Rehab Potential/Progress: [] Excellent [x] Good [] Fair  [] Poor     Goals:    Patient goals: walk and  trf sit to stand , stairs better and easier  Short term goals  Time Frame for Short term goals: 3-4 weeks  1. ind with HEP for LE strength, balance,  and enduracne  2. reports sit to stand 50 % easier with no UE support  3. SLB R and L  10 sec  4. 5 x sit to stand  12 sec or less wihtout UE support     Long Term Goals  Time Frame for Long Term Goals: 4-6 seeks  1. reports walking the grocery store or 1 mile with 75% less fatigue  2. MMT 4+/5 hip and knee B for better activity tolerance  3. Stairs 1 flight reciprocally with 1 rail  4. Tug improved to 9 sec or less  R min walk test 1200ft or more        Electronically signed by:  Dulce Patiño PT, PT, 3/8/2023, 3:45 PM        If you have any questions or concerns, please don't hesitate to call.   Thank you for your referral.      Physician Signature:________________________________Date:_________ TIME: _____  By signing above, therapists plan is approved by physician

## 2023-03-23 ENCOUNTER — HOSPITAL ENCOUNTER (OUTPATIENT)
Dept: PHYSICAL THERAPY | Age: 83
Setting detail: THERAPIES SERIES
Discharge: HOME | End: 2023-03-23
Payer: MEDICARE

## 2023-03-23 PROCEDURE — 97112 NEUROMUSCULAR REEDUCATION: CPT

## 2023-03-23 PROCEDURE — 97110 THERAPEUTIC EXERCISES: CPT

## 2023-03-23 NOTE — FLOWSHEET NOTE
Outpatient Physical Therapy  [place]           [x] Phone: [phone]   Fax: [phone]  Remus Opitz           [] Phone: [phone]   Fax: [phone]        Physical Therapy Daily Treatment Note  Date:  3/23/2023    Patient Name:  Nan Lagos    :  1940  MRN: [id number]  Restrictions/Precautions: No data recorded   Diagnosis:   Fatigue, unspecified type [R53.83]  Physical deconditioning [R53.81] Diagnosis: deconditioning, weakness, limited walking distance  Date of Injury/Surgery:   Treatment Diagnosis:  limited LE and core strength for ADL, stairs and walking activity  Insurance/Certification information: Susana Darden  Referring Physician:  Bárbara Cr MD     PCP: Chico David MD  Next Doctor Visit:    Plan of care signed (Y/N):   no  Outcome Measure: LEFS 21 pts    Visit# / total visits:   2 /  6-10    Pain level: 0/10   Goals:     Patient goals: walk and  trf sit to stand , stairs better and easier  Short term goals  Time Frame for Short term goals: 3-4 weeks  1. ind with HEP for LE strength, balance,  and enduracne  2. reports sit to stand 50 % easier with no UE support  3. SLB R and L  10 sec  4. 5 x sit to stand  12 sec or less wihtout UE support  Long Term Goals  Time Frame for Long Term Goals: 4-6 seeks  1. reports walking the grocery store or 1 mile with 75% less fatigue  2. MMT 4+/5 hip and knee B for better activity tolerance  3. Stairs 1 flight reciprocally with 1 rail  4. Tug improved to 9 sec or less  5. 6 min walk test 1200ft or more    Summary of Evaluation:    Assessment: Pt appears today with decreased LE strength and endurance. He appear to fatigue mainly due to LE fatigue rather than SOB. He is not able to walk as far or climb stairs or sit to stand as easily as he had a few years ago prior to getting several illness that caused him to be more sedentary.  Pt would benefit from skilled therapy interventions, including TE, gait and balance activity as needed to address

## 2023-03-30 ENCOUNTER — HOSPITAL ENCOUNTER (OUTPATIENT)
Dept: PHYSICAL THERAPY | Age: 83
Setting detail: THERAPIES SERIES
Discharge: HOME | End: 2023-03-30
Payer: MEDICARE

## 2023-03-30 PROCEDURE — 97530 THERAPEUTIC ACTIVITIES: CPT

## 2023-03-30 PROCEDURE — 97112 NEUROMUSCULAR REEDUCATION: CPT

## 2023-03-30 NOTE — FLOWSHEET NOTE
Outpatient Physical Therapy  [place]           [x] Phone: [phone]   Fax: [phone]  Adeline Ortiz           [] Phone: [phone]   Fax: [phone]        Physical Therapy Daily Treatment Note  Date:  3/30/2023    Patient Name:  Kehinde Marin    :  1940  MRN: [id number]  Restrictions/Precautions: No data recorded   Diagnosis:   Fatigue, unspecified type [R53.83]  Physical deconditioning [R53.81] Diagnosis: deconditioning, weakness, limited walking distance  Date of Injury/Surgery:   Treatment Diagnosis:  limited LE and core strength for ADL, stairs and walking activity  Insurance/Certification information: Bindu Levy  Referring Physician:  Hemal Honeycutt MD     PCP: Rylan Howell MD  Next Doctor Visit:    Plan of care signed (Y/N):   no  Outcome Measure: LEFS 21 pts    Visit# / total visits:   3 /  6-10    Pain level: 0/10   Goals:     Patient goals: walk and  trf sit to stand , stairs better and easier  Short term goals  Time Frame for Short term goals: 3-4 weeks  1. ind with HEP for LE strength, balance,  and enduracne  2. reports sit to stand 50 % easier with no UE support  3. SLB R and L  10 sec  4. 5 x sit to stand  12 sec or less wihtout UE support  Long Term Goals  Time Frame for Long Term Goals: 4-6 seeks  1. reports walking the grocery store or 1 mile with 75% less fatigue  2. MMT 4+/5 hip and knee B for better activity tolerance  3. Stairs 1 flight reciprocally with 1 rail  4. Tug improved to 9 sec or less      3/30/23 MET 7.4sec  5. 6 min walk test 1200ft or more      3/30/23 MET 1235 ft    Summary of Evaluation:    Assessment: Pt appears today with decreased LE strength and endurance. He appear to fatigue mainly due to LE fatigue rather than SOB. He is not able to walk as far or climb stairs or sit to stand as easily as he had a few years ago prior to getting several illness that caused him to be more sedentary.  Pt would benefit from skilled therapy interventions, including

## 2023-03-31 DIAGNOSIS — J30.9 ALLERGIC RHINITIS, UNSPECIFIED SEASONALITY, UNSPECIFIED TRIGGER: ICD-10-CM

## 2023-03-31 RX ORDER — MONTELUKAST SODIUM 10 MG/1
TABLET ORAL
Qty: 90 TABLET | Refills: 1 | Status: SHIPPED | OUTPATIENT
Start: 2023-03-31

## 2023-04-06 ENCOUNTER — HOSPITAL ENCOUNTER (OUTPATIENT)
Dept: PHYSICAL THERAPY | Age: 83
Setting detail: THERAPIES SERIES
Discharge: HOME | End: 2023-04-06
Payer: MEDICARE

## 2023-04-06 PROCEDURE — 97110 THERAPEUTIC EXERCISES: CPT

## 2023-04-06 NOTE — PROGRESS NOTES
Achieved     Time Frame for Short term goals: 3-4 weeks  1. ind with HEP for LE strength, balance,  and endurance      met      2. reports sit to stand 50 % easier with no UE support         met  3. SLB R and L  10 sec     not met  4. 5 x sit to stand  12 sec or less wihtout UE support      met  Long Term Goals  Time Frame for Long Term Goals: 4-6 seeks  1. reports walking the grocery store or 1 mile with 75% less fatigue        met  2. MMT 4+/5 hip and knee B for better activity tolerance     met  3. Stairs 1 flight reciprocally with 1 rail     met  4. Tug improved to 9 sec or less                                                                    3/30/23 MET 7.4sec  5. 6 min walk test 1200ft or more                                                                   3/30/23 MET 1235 ft         Frequency/Duration:  # Days per week: [] 1 day # Weeks: [] 1 week [] 4 weeks [] 8 weeks     [] 2 days   [] 2 weeks [] 5 weeks [] 10 weeks     [] 3 days   [] 3 weeks [] 6 weeks [] 12 weeks       Rehab Potential: [] Excellent [] Good [] Fair  [] Poor         Patient Status: [] Continue per initial plan of Care     [x] Patient now discharged     [] Additional visits requested, Please re-certify for additional visits: If we are requesting more visits, we fully anticipate the patient's condition is expected to improve within the treatment timeframe we are requesting. Electronically signed by:  Aquiles Mckinley PT,  4/6/2023, 7:34 AM    If you have any questions or concerns, please don't hesitate to call.   Thank you for your referral.    Physician Signature:______________________ Date:______ Time: ________  By signing above, therapists plan is approved by physician

## 2023-04-06 NOTE — FLOWSHEET NOTE
TE, gait and balance activity as needed to address listed impairments, progress toward goal completion and improve ADL/IADL status. PT also warranted to reduce risk for further injury or decline      Subjective:   Pt states he is nearly back to his PLOF and is doing well with his exercises,  he states he feels he is rady to d/c today and will continue with the exercises. He reports feeling 75% better overall. He is not having trouble with shopping or increased walking any longer . Any changes in Ambulatory Summary Sheet? None      Objective:  ( 23 reports ind with HEP, sit to stand reported 75 % easier, 5x sit to stand 10.07 sec , TUG 7.5 sec, walking at grocery store without problem less fatigue, Stairs 1 flight : reciprocally with 1 rail for balance , MMT B LE 4+/5  )          Exercises: (No more than 4 columns)   Exercise/Equipment #1 3/8/23 3-23-23  3/30/23 #3 #4 23            WARM UP       nustep    5 min         6min walk:  1235ft       TU.4 sec    TABLE                                             STANDING       Marching, hip abd, hip ext   1*10 steps In p// bars, inst for light UE for  balance challenge. X 10 ea, significant trunk sway. 2*10 each B   Side stepping  1*10 P// bars x 3 laps, states he couldn't really do this at home. // Bars  6 lengths    Sit to stand  1*10 x10 2X10 light use of forearms on thighs  18\"  2*10    SLB  each side  2*20 sec effort  Best attempt R 5 sec, L 7 sec 2*20 sec effort   Step up/down. 6\" x 8 each way                       PROPRIOCEPTION       Tandem stand   Use of UE for balance as needed 30 sec attempts  2*1 min effort on foam   Feet together, [place]   Eyes open x 30 sec, Eyes closed 30 sec  --   Foam stand   1 min standard stance.   30sec feet together Narrow 2*1 min   Stairs     1 flight reciprocally 1 rail          MODALITIES                         Other Therapeutic Activities/Education:  Patient received education on their current pathology

## 2023-05-18 ENCOUNTER — OFFICE VISIT (OUTPATIENT)
Dept: INTERNAL MEDICINE CLINIC | Age: 83
End: 2023-05-18
Payer: MEDICARE

## 2023-05-18 VITALS
DIASTOLIC BLOOD PRESSURE: 75 MMHG | BODY MASS INDEX: 30.21 KG/M2 | OXYGEN SATURATION: 98 % | HEIGHT: 70 IN | WEIGHT: 211 LBS | SYSTOLIC BLOOD PRESSURE: 112 MMHG | HEART RATE: 83 BPM

## 2023-05-18 DIAGNOSIS — E03.4 HYPOTHYROIDISM DUE TO ACQUIRED ATROPHY OF THYROID: ICD-10-CM

## 2023-05-18 DIAGNOSIS — R53.83 FATIGUE, UNSPECIFIED TYPE: ICD-10-CM

## 2023-05-18 DIAGNOSIS — Z72.0 TOBACCO ABUSE: ICD-10-CM

## 2023-05-18 DIAGNOSIS — I25.119 CORONARY ARTERY DISEASE INVOLVING NATIVE CORONARY ARTERY OF NATIVE HEART WITH ANGINA PECTORIS (HCC): ICD-10-CM

## 2023-05-18 DIAGNOSIS — E11.65 TYPE 2 DIABETES MELLITUS WITH HYPERGLYCEMIA, WITHOUT LONG-TERM CURRENT USE OF INSULIN (HCC): Primary | ICD-10-CM

## 2023-05-18 DIAGNOSIS — E78.2 MIXED HYPERLIPIDEMIA: ICD-10-CM

## 2023-05-18 DIAGNOSIS — J30.9 ALLERGIC RHINITIS, UNSPECIFIED SEASONALITY, UNSPECIFIED TRIGGER: ICD-10-CM

## 2023-05-18 DIAGNOSIS — Z12.5 SCREENING FOR PROSTATE CANCER: ICD-10-CM

## 2023-05-18 DIAGNOSIS — E55.9 VITAMIN D DEFICIENCY: ICD-10-CM

## 2023-05-18 LAB
ALBUMIN SERPL-MCNC: 4.3 G/DL (ref 3.4–5)
ALBUMIN/GLOB SERPL: 1.7 {RATIO} (ref 1.1–2.2)
ALP SERPL-CCNC: 89 U/L (ref 40–129)
ALT SERPL-CCNC: 12 U/L (ref 10–40)
ANION GAP SERPL CALCULATED.3IONS-SCNC: 9 MMOL/L (ref 3–16)
AST SERPL-CCNC: 21 U/L (ref 15–37)
BASOPHILS # BLD: 0 K/UL (ref 0–0.2)
BASOPHILS NFR BLD: 0.4 %
BILIRUB SERPL-MCNC: 0.6 MG/DL (ref 0–1)
BUN SERPL-MCNC: 21 MG/DL (ref 7–20)
CALCIUM SERPL-MCNC: 9.8 MG/DL (ref 8.3–10.6)
CHLORIDE SERPL-SCNC: 104 MMOL/L (ref 99–110)
CHOLEST SERPL-MCNC: 144 MG/DL (ref 0–199)
CHP ED QC CHECK: NORMAL
CO2 SERPL-SCNC: 24 MMOL/L (ref 21–32)
CREAT SERPL-MCNC: 1.1 MG/DL (ref 0.8–1.3)
DEPRECATED RDW RBC AUTO: 13.8 % (ref 12.4–15.4)
EOSINOPHIL # BLD: 0.2 K/UL (ref 0–0.6)
EOSINOPHIL NFR BLD: 2.9 %
GFR SERPLBLD CREATININE-BSD FMLA CKD-EPI: >60 ML/MIN/{1.73_M2}
GLUCOSE BLD-MCNC: 122 MG/DL
GLUCOSE SERPL-MCNC: 103 MG/DL (ref 70–99)
HCT VFR BLD AUTO: 42.3 % (ref 40.5–52.5)
HDLC SERPL-MCNC: 52 MG/DL (ref 40–60)
HGB BLD-MCNC: 14 G/DL (ref 13.5–17.5)
LDL CHOLESTEROL CALCULATED: 57 MG/DL
LYMPHOCYTES # BLD: 1.4 K/UL (ref 1–5.1)
LYMPHOCYTES NFR BLD: 18.7 %
MCH RBC QN AUTO: 31.8 PG (ref 26–34)
MCHC RBC AUTO-ENTMCNC: 33 G/DL (ref 31–36)
MCV RBC AUTO: 96.3 FL (ref 80–100)
MONOCYTES # BLD: 0.5 K/UL (ref 0–1.3)
MONOCYTES NFR BLD: 6.6 %
NEUTROPHILS # BLD: 5.4 K/UL (ref 1.7–7.7)
NEUTROPHILS NFR BLD: 71.4 %
PLATELET # BLD AUTO: 282 K/UL (ref 135–450)
PMV BLD AUTO: 9.1 FL (ref 5–10.5)
POTASSIUM SERPL-SCNC: 4.9 MMOL/L (ref 3.5–5.1)
PROT SERPL-MCNC: 6.8 G/DL (ref 6.4–8.2)
RBC # BLD AUTO: 4.39 M/UL (ref 4.2–5.9)
SODIUM SERPL-SCNC: 137 MMOL/L (ref 136–145)
TRIGL SERPL-MCNC: 174 MG/DL (ref 0–150)
TSH SERPL DL<=0.005 MIU/L-ACNC: 2.18 UIU/ML (ref 0.27–4.2)
VLDLC SERPL CALC-MCNC: 35 MG/DL
WBC # BLD AUTO: 7.6 K/UL (ref 4–11)

## 2023-05-18 PROCEDURE — 82962 GLUCOSE BLOOD TEST: CPT | Performed by: INTERNAL MEDICINE

## 2023-05-18 PROCEDURE — 99214 OFFICE O/P EST MOD 30 MIN: CPT | Performed by: INTERNAL MEDICINE

## 2023-05-18 PROCEDURE — 4004F PT TOBACCO SCREEN RCVD TLK: CPT | Performed by: INTERNAL MEDICINE

## 2023-05-18 PROCEDURE — 1123F ACP DISCUSS/DSCN MKR DOCD: CPT | Performed by: INTERNAL MEDICINE

## 2023-05-18 PROCEDURE — G8427 DOCREV CUR MEDS BY ELIG CLIN: HCPCS | Performed by: INTERNAL MEDICINE

## 2023-05-18 PROCEDURE — 3044F HG A1C LEVEL LT 7.0%: CPT | Performed by: INTERNAL MEDICINE

## 2023-05-18 PROCEDURE — 36415 COLL VENOUS BLD VENIPUNCTURE: CPT | Performed by: INTERNAL MEDICINE

## 2023-05-18 PROCEDURE — G8417 CALC BMI ABV UP PARAM F/U: HCPCS | Performed by: INTERNAL MEDICINE

## 2023-05-18 NOTE — PROGRESS NOTES
Name: Kwabena Ledbetter  [id number]  Age: 80 y.o. YOB: 1940  Sex: male    CHIEF COMPLAINT:    Chief Complaint   Patient presents with    Diabetes    Hyperlipidemia    Other     Other chronic conditions         HISTORY OF PRESENT ILLNESS:     This is a pleasant  80 y.o. male  is seen today for management of chronic medical problems and medications refills. Previous records reviewed . Doing OK . Dizziness has improved. Was getting tired easily but  He recently had physical therapy which improved his energy  Denies CP or SOB. Being followed by Dr. Resendez Dates periodically for his cardiac problems. No fever , sore throat or cough or congestion. Denies any abdominal pain. Appetite OK. Bowels moving Dondra Brina. Occasionally gets constipated rarely. No urinary symptoms. Denies any significant arthritis. Hearing is ok. Vision Ok with glasses. Denies  any significant skin lesions. Denies any significant depression or anxiety. Patient claims that he does not check blood sugars at home and he quit doing it because his blood sugars were normal most of the time. No other new  complaints. He has been fully vaccinated for COVID-19 and also boosted x2. .. Did not get  a Flu shot this year.   Today his blood sugar is 122 and his A1c was 5.7 on 2/16/2023  His labs from 11/17/2022 and 12/1/2022 were reviewed and explained to the patient      Past Medical History:    Patient Active Problem List   Diagnosis    Type 2 diabetes mellitus ([address]    Hyperlipidemia    Diverticulosis    Hx of adenomatous colonic polyps    Family history of prostate cancer    Anxiety    Dyspnea    History of 24 hour EKG monitoring    Shingles    Insomnia    Vertigo    Allergic rhinitis    Screening for prostate cancer    Need for Tdap vaccination    Precordial pain    Coronary artery disease involving native coronary artery of native heart with angina pectoris ([address]    Tobacco abuse    Fatigue    History of COVID-19    Vitamin D

## 2023-05-19 DIAGNOSIS — E03.4 HYPOTHYROIDISM DUE TO ACQUIRED ATROPHY OF THYROID: ICD-10-CM

## 2023-05-19 LAB — PSA SERPL DL<=0.01 NG/ML-MCNC: 0.04 NG/ML (ref 0–4)

## 2023-05-19 RX ORDER — LEVOTHYROXINE SODIUM 0.07 MG/1
75 TABLET ORAL DAILY
Qty: 90 TABLET | Refills: 1 | Status: SHIPPED | OUTPATIENT
Start: 2023-05-19

## 2023-06-17 PROBLEM — Z12.5 SCREENING FOR PROSTATE CANCER: Status: RESOLVED | Noted: 2021-04-21 | Resolved: 2023-06-17

## 2023-06-19 ENCOUNTER — OFFICE VISIT (OUTPATIENT)
Dept: CARDIOLOGY CLINIC | Age: 83
End: 2023-06-19
Payer: MEDICARE

## 2023-06-19 VITALS
DIASTOLIC BLOOD PRESSURE: 76 MMHG | BODY MASS INDEX: 30.43 KG/M2 | HEIGHT: 70 IN | SYSTOLIC BLOOD PRESSURE: 122 MMHG | HEART RATE: 88 BPM | WEIGHT: 212.6 LBS

## 2023-06-19 DIAGNOSIS — E78.2 MIXED HYPERLIPIDEMIA: ICD-10-CM

## 2023-06-19 DIAGNOSIS — Z72.0 TOBACCO ABUSE: ICD-10-CM

## 2023-06-19 DIAGNOSIS — Z86.16 HISTORY OF COVID-19: ICD-10-CM

## 2023-06-19 DIAGNOSIS — I25.119 CORONARY ARTERY DISEASE INVOLVING NATIVE CORONARY ARTERY OF NATIVE HEART WITH ANGINA PECTORIS (HCC): Primary | ICD-10-CM

## 2023-06-19 DIAGNOSIS — E11.65 TYPE 2 DIABETES MELLITUS WITH HYPERGLYCEMIA, WITHOUT LONG-TERM CURRENT USE OF INSULIN (HCC): ICD-10-CM

## 2023-06-19 PROCEDURE — 1123F ACP DISCUSS/DSCN MKR DOCD: CPT | Performed by: INTERNAL MEDICINE

## 2023-06-19 PROCEDURE — G8417 CALC BMI ABV UP PARAM F/U: HCPCS | Performed by: INTERNAL MEDICINE

## 2023-06-19 PROCEDURE — 3044F HG A1C LEVEL LT 7.0%: CPT | Performed by: INTERNAL MEDICINE

## 2023-06-19 PROCEDURE — G8427 DOCREV CUR MEDS BY ELIG CLIN: HCPCS | Performed by: INTERNAL MEDICINE

## 2023-06-19 PROCEDURE — 99213 OFFICE O/P EST LOW 20 MIN: CPT | Performed by: INTERNAL MEDICINE

## 2023-06-19 PROCEDURE — 4004F PT TOBACCO SCREEN RCVD TLK: CPT | Performed by: INTERNAL MEDICINE

## 2023-06-19 RX ORDER — NITROGLYCERIN 0.4 MG/1
0.4 TABLET SUBLINGUAL EVERY 5 MIN PRN
Qty: 25 TABLET | Refills: 3 | Status: SHIPPED | OUTPATIENT
Start: 2023-06-19

## 2023-06-19 NOTE — PROGRESS NOTES
Shortness of Breath:  When did it start? Since last visit, tight from chest to belly button,   Severity;how many flights of stairs can they go up without having SOB? 2  Associated features?  No  Orthopnea; how many pillows do they sleep with? 0
questions answered. Relevant concelling performed. Office follow up in six months.

## 2023-06-19 NOTE — PATIENT INSTRUCTIONS
CORONARY ARTERY DISEASE: Yes  clinically stable. Patient is on optimal medical regimen ( see medication list above )  Patient is currently  ?symptomatic from CAD. -changes in  treatment:  no,on ASA & iMDUR   -Testing ordered:  no  [facility] classification: 3  CATH 7/28/2021   Most significant stenosis is in RCA, it is 50 to 60 % proximal   stenosis. Normal LV systolic function. LVEF is 50 to 55 %. HTN:No History  VHD: NONE SIGNIFICANT. ECHO 7/26/2021   Left ventricular function is low normal, EF is estimated at 50-55%. Mild left ventricular hypertrophy. Normal diastolic filling pattern for age. Mildly dilated left atrium. Mild mitral and tricuspid regurgitation is present. RVSP is 28 mmHg. No evidence of pericardial effusion. DYSLIPIDEMIA: yes,   Patient's profile is at / near [address] current medical regimen wellyes, Takes Lipitor  See most recent Lab values in Labs section above. Diabetes mellitis:yes,   BS under good control yes,   Hgb A1c avilable yes,     TESTS ORDERED:none this visit     PREVIOUSLY ORDERED TESTS REVIEWED & DISCUSSED WITH THE PATIENT:     I personally reviewed & interpreted, all previously ordered tests as copied above. Latest Labs are pulled in to the note with dates. Labs, specially in Reference to Lipid profile, Cardiac testing in the form of Echo ( dated: ), stress tests ( dated: ) & other relevant cardiac testing reviewed with patient & recommendations made based on assessment of the results. Discussed role of Cardiac risk factors & effects + treatment of co morbidities with patient & advised accordingly. MEDICATIONS: List of medications patient is currently taking is reviewed in detail with the patient. Discussed any side effects or problems taking the medication. Recommend Continue present management & medications as listed. Will prescribe SL NTG for PRN use.     AFFIRMATION: I spent at least 20 minutes of time reviewing patient's
No joint pain, swelling or deformity; no limitation of movement

## 2023-06-29 DIAGNOSIS — E03.4 HYPOTHYROIDISM DUE TO ACQUIRED ATROPHY OF THYROID: ICD-10-CM

## 2023-06-29 RX ORDER — LEVOTHYROXINE SODIUM 0.05 MG/1
TABLET ORAL
Qty: 90 TABLET | Refills: 1 | OUTPATIENT
Start: 2023-06-29

## 2023-06-29 RX ORDER — ISOSORBIDE MONONITRATE 30 MG/1
30 TABLET, EXTENDED RELEASE ORAL DAILY
Qty: 90 TABLET | Refills: 1 | Status: SHIPPED | OUTPATIENT
Start: 2023-06-29

## 2023-08-10 ENCOUNTER — OFFICE VISIT (OUTPATIENT)
Dept: INTERNAL MEDICINE CLINIC | Age: 83
End: 2023-08-10
Payer: MEDICARE

## 2023-08-10 VITALS
OXYGEN SATURATION: 98 % | HEIGHT: 70 IN | DIASTOLIC BLOOD PRESSURE: 64 MMHG | WEIGHT: 217 LBS | BODY MASS INDEX: 31.07 KG/M2 | HEART RATE: 86 BPM | SYSTOLIC BLOOD PRESSURE: 96 MMHG

## 2023-08-10 DIAGNOSIS — J30.9 ALLERGIC RHINITIS, UNSPECIFIED SEASONALITY, UNSPECIFIED TRIGGER: ICD-10-CM

## 2023-08-10 DIAGNOSIS — E55.9 VITAMIN D DEFICIENCY: ICD-10-CM

## 2023-08-10 DIAGNOSIS — N18.30 STAGE 3 CHRONIC KIDNEY DISEASE, UNSPECIFIED WHETHER STAGE 3A OR 3B CKD (HCC): ICD-10-CM

## 2023-08-10 DIAGNOSIS — E03.4 HYPOTHYROIDISM DUE TO ACQUIRED ATROPHY OF THYROID: ICD-10-CM

## 2023-08-10 DIAGNOSIS — I25.119 CORONARY ARTERY DISEASE INVOLVING NATIVE CORONARY ARTERY OF NATIVE HEART WITH ANGINA PECTORIS (HCC): ICD-10-CM

## 2023-08-10 DIAGNOSIS — E11.65 TYPE 2 DIABETES MELLITUS WITH HYPERGLYCEMIA, WITHOUT LONG-TERM CURRENT USE OF INSULIN (HCC): Primary | ICD-10-CM

## 2023-08-10 DIAGNOSIS — E78.2 MIXED HYPERLIPIDEMIA: ICD-10-CM

## 2023-08-10 DIAGNOSIS — F41.9 ANXIETY: ICD-10-CM

## 2023-08-10 LAB
CHP ED QC CHECK: NORMAL
GLUCOSE BLD-MCNC: 133 MG/DL
HBA1C MFR BLD: 6 %

## 2023-08-10 PROCEDURE — G8417 CALC BMI ABV UP PARAM F/U: HCPCS | Performed by: INTERNAL MEDICINE

## 2023-08-10 PROCEDURE — 1123F ACP DISCUSS/DSCN MKR DOCD: CPT | Performed by: INTERNAL MEDICINE

## 2023-08-10 PROCEDURE — 4004F PT TOBACCO SCREEN RCVD TLK: CPT | Performed by: INTERNAL MEDICINE

## 2023-08-10 PROCEDURE — 83037 HB GLYCOSYLATED A1C HOME DEV: CPT | Performed by: INTERNAL MEDICINE

## 2023-08-10 PROCEDURE — G8427 DOCREV CUR MEDS BY ELIG CLIN: HCPCS | Performed by: INTERNAL MEDICINE

## 2023-08-10 PROCEDURE — 82962 GLUCOSE BLOOD TEST: CPT | Performed by: INTERNAL MEDICINE

## 2023-08-10 PROCEDURE — 99214 OFFICE O/P EST MOD 30 MIN: CPT | Performed by: INTERNAL MEDICINE

## 2023-08-10 PROCEDURE — 3044F HG A1C LEVEL LT 7.0%: CPT | Performed by: INTERNAL MEDICINE

## 2023-08-10 NOTE — PROGRESS NOTES
Name: Emilia Jackson  [id number]  Age: 80 y.o. YOB: 1940  Sex: male    CHIEF COMPLAINT:    Chief Complaint   Patient presents with    Diabetes    Hyperlipidemia    Other     Other chronic conditions         HISTORY OF PRESENT ILLNESS:     This is a pleasant  80 y.o. male  is seen today for management of chronic medical problems and medications refills. Previous records reviewed . Doing OK . Dizziness has improved. Was getting tired easily but  He recently had physical therapy which improved his energy  He is doing exercises at home that was advised by them. Denies CP or SOB. Being followed by Dr. Lew Ruiz periodically for his cardiac problems. No fever , sore throat or cough or congestion. Denies any abdominal pain. Appetite OK. Bowels moving [address] [facility]. Occasionally gets constipated rarely. No urinary symptoms. Denies any significant arthritis. Hearing is ok. Vision Ok with glasses. Sees Dr. Shameka Davis periodically. Denies  any significant skin lesions. Denies any significant depression or anxiety. Patient claims that he does not check blood sugars at home and he quit doing it because his blood sugars were normal most of the time. No other new  complaints. He has been fully vaccinated for COVID-19 and also boosted x2. .. Did not get  a Flu shot this year. Today his blood sugar is 133 and his A1c was 6.0 .   His labs from 5/18/2023  were reviewed and explained to the patient    Past Medical History:    Patient Active Problem List   Diagnosis    Type 2 diabetes mellitus ([facility])    Hyperlipidemia    Diverticulosis    Hx of adenomatous colonic polyps    Family history of prostate cancer    Anxiety    Dyspnea    History of 24 hour EKG monitoring    Shingles    Insomnia    Vertigo    Allergic rhinitis    Need for Tdap vaccination    Precordial pain    Coronary artery disease involving native coronary artery of native heart with angina pectoris ([facility])    Tobacco abuse    Fatigue    History

## 2023-08-15 DIAGNOSIS — E11.65 TYPE 2 DIABETES MELLITUS WITH HYPERGLYCEMIA, WITHOUT LONG-TERM CURRENT USE OF INSULIN (HCC): ICD-10-CM

## 2023-08-16 RX ORDER — METFORMIN HYDROCHLORIDE 500 MG/1
TABLET, EXTENDED RELEASE ORAL
Qty: 90 TABLET | Refills: 1 | Status: SHIPPED | OUTPATIENT
Start: 2023-08-16

## 2023-08-22 DIAGNOSIS — R53.83 FATIGUE, UNSPECIFIED TYPE: ICD-10-CM

## 2023-08-22 DIAGNOSIS — E11.65 TYPE 2 DIABETES MELLITUS WITH HYPERGLYCEMIA, WITHOUT LONG-TERM CURRENT USE OF INSULIN (HCC): ICD-10-CM

## 2023-08-22 RX ORDER — M-VIT,TX,IRON,MINS/CALC/FOLIC 27MG-0.4MG
1 TABLET ORAL DAILY
Qty: 90 TABLET | Refills: 1 | Status: SHIPPED | OUTPATIENT
Start: 2023-08-22 | End: 2024-08-21

## 2023-08-22 RX ORDER — METFORMIN HYDROCHLORIDE 500 MG/1
500 TABLET, EXTENDED RELEASE ORAL
Qty: 90 TABLET | Refills: 1 | Status: SHIPPED | OUTPATIENT
Start: 2023-08-22

## 2023-09-30 DIAGNOSIS — J30.9 ALLERGIC RHINITIS, UNSPECIFIED SEASONALITY, UNSPECIFIED TRIGGER: ICD-10-CM

## 2023-10-02 RX ORDER — MONTELUKAST SODIUM 10 MG/1
TABLET ORAL
Qty: 90 TABLET | Refills: 1 | Status: SHIPPED | OUTPATIENT
Start: 2023-10-02

## 2023-10-06 DIAGNOSIS — E03.4 HYPOTHYROIDISM DUE TO ACQUIRED ATROPHY OF THYROID: ICD-10-CM

## 2023-10-06 RX ORDER — LEVOTHYROXINE SODIUM 0.07 MG/1
75 TABLET ORAL DAILY
Qty: 90 TABLET | Refills: 1 | Status: SHIPPED | OUTPATIENT
Start: 2023-10-06

## 2023-11-14 ENCOUNTER — OFFICE VISIT (OUTPATIENT)
Dept: INTERNAL MEDICINE CLINIC | Age: 83
End: 2023-11-14
Payer: MEDICARE

## 2023-11-14 VITALS
HEART RATE: 88 BPM | DIASTOLIC BLOOD PRESSURE: 70 MMHG | HEIGHT: 70 IN | WEIGHT: 207 LBS | SYSTOLIC BLOOD PRESSURE: 104 MMHG | BODY MASS INDEX: 29.63 KG/M2 | OXYGEN SATURATION: 97 % | TEMPERATURE: 96.3 F

## 2023-11-14 DIAGNOSIS — Z00.00 MEDICARE ANNUAL WELLNESS VISIT, SUBSEQUENT: Primary | ICD-10-CM

## 2023-11-14 PROCEDURE — 1123F ACP DISCUSS/DSCN MKR DOCD: CPT | Performed by: INTERNAL MEDICINE

## 2023-11-14 PROCEDURE — G0439 PPPS, SUBSEQ VISIT: HCPCS | Performed by: INTERNAL MEDICINE

## 2023-11-14 PROCEDURE — G8484 FLU IMMUNIZE NO ADMIN: HCPCS | Performed by: INTERNAL MEDICINE

## 2023-11-14 RX ORDER — TRIAMCINOLONE ACETONIDE 1 MG/G
CREAM TOPICAL
Qty: 45 G | Refills: 2 | Status: SHIPPED | OUTPATIENT
Start: 2023-11-14

## 2023-11-14 ASSESSMENT — PATIENT HEALTH QUESTIONNAIRE - PHQ9
SUM OF ALL RESPONSES TO PHQ9 QUESTIONS 1 & 2: 0
2. FEELING DOWN, DEPRESSED OR HOPELESS: 0
SUM OF ALL RESPONSES TO PHQ QUESTIONS 1-9: 0
1. LITTLE INTEREST OR PLEASURE IN DOING THINGS: 0
SUM OF ALL RESPONSES TO PHQ QUESTIONS 1-9: 0

## 2023-11-14 ASSESSMENT — LIFESTYLE VARIABLES
HOW MANY STANDARD DRINKS CONTAINING ALCOHOL DO YOU HAVE ON A TYPICAL DAY: PATIENT DOES NOT DRINK
HOW OFTEN DO YOU HAVE A DRINK CONTAINING ALCOHOL: NEVER

## 2023-11-14 NOTE — PATIENT INSTRUCTIONS
We are committed to providing you the best care possible. If you receive a survey after visiting one of our offices, please take time to share your experience concerning your physician office visit. These surveys are confidential and no health information about you is shared. We are eager to improve for you and we are counting on your feedback to help make that happen. Preventing Falls: Care Instructions    Talk to your doctor about the medicines you take. Ask if any of them increase the risk of falls and whether they can be changed or stopped. Try to exercise regularly. It can help improve your strength and balance. This can help lower your risk of falling. Practice fall safety and prevention. Wear low-heeled shoes that fit well and give your feet good support. Talk to your doctor if you have foot problems that make this hard. Carry a cellphone or wear a medical alert device that you can use to call for help. Use stepladders instead of chairs to reach high objects. Don't climb if you're at risk for falls. Ask for help, if needed. Wear the correct eyeglasses, if you need them. Make your home safer. Remove rugs, cords, clutter, and furniture from walkways. Keep your house well lit. Use night-lights in hallways and bathrooms. Install and use sturdy handrails on stairways. Wear nonskid footwear, even inside. Don't walk barefoot or in socks without shoes. Be safe outside. Use handrails, curb cuts, and ramps whenever possible. Keep your hands free by using a shoulder bag or backpack. Try to walk in well-lit areas. Watch out for uneven ground, changes in pavement, and debris. Be careful in the winter. Walk on the grass or gravel when sidewalks are slippery. Use de-icer on steps and walkways. Add non-slip devices to shoes. Put grab bars and nonskid mats in your shower or tub and near the toilet. Try to use a shower chair or bath bench when bathing.    Get into a tub or

## 2023-12-26 PROBLEM — Z82.49 FAMILY HISTORY OF CORONARY ARTERY DISEASE: Status: ACTIVE | Noted: 2023-12-26

## 2023-12-26 PROBLEM — R94.31 ABNORMAL EKG: Status: ACTIVE | Noted: 2023-12-26

## 2023-12-28 ENCOUNTER — TELEPHONE (OUTPATIENT)
Dept: CARDIOLOGY CLINIC | Age: 83
End: 2023-12-28

## 2023-12-28 NOTE — TELEPHONE ENCOUNTER
Spk to pt and explained that the Genet and echo were normal and confirmed that he has a follow up appt with Dr. Steffanie Paula on 1/22/2024 at 1:45.

## 2024-01-02 RX ORDER — ISOSORBIDE MONONITRATE 30 MG/1
30 TABLET, EXTENDED RELEASE ORAL DAILY
Qty: 90 TABLET | Refills: 1 | Status: SHIPPED | OUTPATIENT
Start: 2024-01-02

## 2024-01-02 RX ORDER — ATORVASTATIN CALCIUM 40 MG/1
40 TABLET, FILM COATED ORAL DAILY
Qty: 90 TABLET | Refills: 3 | Status: SHIPPED | OUTPATIENT
Start: 2024-01-02

## 2024-01-22 ENCOUNTER — OFFICE VISIT (OUTPATIENT)
Dept: CARDIOLOGY CLINIC | Age: 84
End: 2024-01-22
Payer: MEDICARE

## 2024-01-22 VITALS
SYSTOLIC BLOOD PRESSURE: 108 MMHG | HEART RATE: 60 BPM | WEIGHT: 208.4 LBS | DIASTOLIC BLOOD PRESSURE: 64 MMHG | BODY MASS INDEX: 29.84 KG/M2 | HEIGHT: 70 IN

## 2024-01-22 DIAGNOSIS — I25.119 CORONARY ARTERY DISEASE INVOLVING NATIVE CORONARY ARTERY OF NATIVE HEART WITH ANGINA PECTORIS (HCC): Primary | ICD-10-CM

## 2024-01-22 DIAGNOSIS — Z72.0 TOBACCO ABUSE: ICD-10-CM

## 2024-01-22 DIAGNOSIS — E78.2 MIXED HYPERLIPIDEMIA: ICD-10-CM

## 2024-01-22 DIAGNOSIS — R94.31 ABNORMAL EKG: ICD-10-CM

## 2024-01-22 DIAGNOSIS — Z86.16 HISTORY OF COVID-19: ICD-10-CM

## 2024-01-22 DIAGNOSIS — E11.65 TYPE 2 DIABETES MELLITUS WITH HYPERGLYCEMIA, WITHOUT LONG-TERM CURRENT USE OF INSULIN (HCC): ICD-10-CM

## 2024-01-22 DIAGNOSIS — Z82.49 FAMILY HISTORY OF CORONARY ARTERY DISEASE: ICD-10-CM

## 2024-01-22 PROCEDURE — G8427 DOCREV CUR MEDS BY ELIG CLIN: HCPCS | Performed by: INTERNAL MEDICINE

## 2024-01-22 PROCEDURE — G8417 CALC BMI ABV UP PARAM F/U: HCPCS | Performed by: INTERNAL MEDICINE

## 2024-01-22 PROCEDURE — 99213 OFFICE O/P EST LOW 20 MIN: CPT | Performed by: INTERNAL MEDICINE

## 2024-01-22 PROCEDURE — 4004F PT TOBACCO SCREEN RCVD TLK: CPT | Performed by: INTERNAL MEDICINE

## 2024-01-22 PROCEDURE — G8484 FLU IMMUNIZE NO ADMIN: HCPCS | Performed by: INTERNAL MEDICINE

## 2024-01-22 PROCEDURE — 1123F ACP DISCUSS/DSCN MKR DOCD: CPT | Performed by: INTERNAL MEDICINE

## 2024-01-22 NOTE — PROGRESS NOTES
OFFICE PROGRESS NOTES      Desean is a 83 y.o. male who has    CHIEF COMPLAINT AS FOLLOWS:  CHEST PAIN: Patient denies any C/O chest pains at this time.      SOB:  C/O SOB with exertion.              .   LEG EDEMA: No leg edema   PALPITATIONS: Denies any C/O Palpitations   DIZZINESS: No C/O Dizziness   SYNCOPE: None   OTHER/ ADDITIONAL COMPLAINTS:                                     HPI: Patient is here for F/U on his CAD & Dyslipidemia problems.   CAD: Patient has known CAD.   Dyslipidemia: Patient has known mixed dyslipidemia. Has been treated with guideline recommended medical / physical/ diet therapy as stated below.                Current Outpatient Medications   Medication Sig Dispense Refill    atorvastatin (LIPITOR) 40 MG tablet Take 1 tablet by mouth daily 90 tablet 3    isosorbide mononitrate (IMDUR) 30 MG extended release tablet Take 1 tablet by mouth daily 90 tablet 1    apixaban (ELIQUIS) 5 MG TABS tablet Take 1 tablet by mouth 2 times daily 60 tablet 5    triamcinolone (KENALOG) 0.1 % cream Apply topically 2-4 times daily. 45 g 2    levothyroxine (SYNTHROID) 75 MCG tablet TAKE 1 TABLET BY MOUTH EVERY DAY 90 tablet 1    montelukast (SINGULAIR) 10 MG tablet TAKE 1 TABLET BY MOUTH EVERY DAY 90 tablet 1    Multiple Vitamins-Minerals (THERAPEUTIC MULTIVITAMIN-MINERALS) tablet Take 1 tablet by mouth daily 90 tablet 1    metFORMIN (GLUCOPHAGE-XR) 500 MG extended release tablet Take 1 tablet by mouth daily (with breakfast) 90 tablet 1    nitroGLYCERIN (NITROSTAT) 0.4 MG SL tablet Place 1 tablet under the tongue every 5 minutes as needed for Chest pain up to max of 3 total doses. If no relief after 1 dose, call 911. 25 tablet 3    sodium polystyrene (SPS) 15 GM/60ML suspension Take 60 mLs by mouth once for 1 dose 240 mL 3    cetirizine (ZYRTEC) 5 MG tablet Take 1 tablet by mouth daily as needed for Allergies or Rhinitis (Patient not taking: Reported on 12/19/2023) 30 tablet 3    Cholecalciferol (VITAMIN D3)

## 2024-02-13 ENCOUNTER — OFFICE VISIT (OUTPATIENT)
Dept: INTERNAL MEDICINE CLINIC | Age: 84
End: 2024-02-13
Payer: MEDICARE

## 2024-02-13 VITALS
SYSTOLIC BLOOD PRESSURE: 110 MMHG | HEART RATE: 67 BPM | WEIGHT: 208 LBS | DIASTOLIC BLOOD PRESSURE: 64 MMHG | OXYGEN SATURATION: 97 % | HEIGHT: 70 IN | BODY MASS INDEX: 29.78 KG/M2

## 2024-02-13 DIAGNOSIS — I25.119 CORONARY ARTERY DISEASE INVOLVING NATIVE CORONARY ARTERY OF NATIVE HEART WITH ANGINA PECTORIS (HCC): ICD-10-CM

## 2024-02-13 DIAGNOSIS — E55.9 VITAMIN D DEFICIENCY: ICD-10-CM

## 2024-02-13 DIAGNOSIS — F41.9 ANXIETY: ICD-10-CM

## 2024-02-13 DIAGNOSIS — I48.0 PAF (PAROXYSMAL ATRIAL FIBRILLATION) (HCC): Primary | ICD-10-CM

## 2024-02-13 DIAGNOSIS — J30.9 ALLERGIC RHINITIS, UNSPECIFIED SEASONALITY, UNSPECIFIED TRIGGER: ICD-10-CM

## 2024-02-13 DIAGNOSIS — E78.2 MIXED HYPERLIPIDEMIA: ICD-10-CM

## 2024-02-13 DIAGNOSIS — N18.30 STAGE 3 CHRONIC KIDNEY DISEASE, UNSPECIFIED WHETHER STAGE 3A OR 3B CKD (HCC): ICD-10-CM

## 2024-02-13 DIAGNOSIS — E11.65 TYPE 2 DIABETES MELLITUS WITH HYPERGLYCEMIA, WITHOUT LONG-TERM CURRENT USE OF INSULIN (HCC): ICD-10-CM

## 2024-02-13 DIAGNOSIS — E03.4 HYPOTHYROIDISM DUE TO ACQUIRED ATROPHY OF THYROID: ICD-10-CM

## 2024-02-13 DIAGNOSIS — Z72.0 TOBACCO ABUSE: ICD-10-CM

## 2024-02-13 LAB
CHP ED QC CHECK: NORMAL
GLUCOSE BLD-MCNC: 120 MG/DL
HBA1C MFR BLD: 5.9 %

## 2024-02-13 PROCEDURE — 3044F HG A1C LEVEL LT 7.0%: CPT | Performed by: INTERNAL MEDICINE

## 2024-02-13 PROCEDURE — 4004F PT TOBACCO SCREEN RCVD TLK: CPT | Performed by: INTERNAL MEDICINE

## 2024-02-13 PROCEDURE — G8484 FLU IMMUNIZE NO ADMIN: HCPCS | Performed by: INTERNAL MEDICINE

## 2024-02-13 PROCEDURE — 1123F ACP DISCUSS/DSCN MKR DOCD: CPT | Performed by: INTERNAL MEDICINE

## 2024-02-13 PROCEDURE — G8417 CALC BMI ABV UP PARAM F/U: HCPCS | Performed by: INTERNAL MEDICINE

## 2024-02-13 PROCEDURE — G8427 DOCREV CUR MEDS BY ELIG CLIN: HCPCS | Performed by: INTERNAL MEDICINE

## 2024-02-13 PROCEDURE — 99214 OFFICE O/P EST MOD 30 MIN: CPT | Performed by: INTERNAL MEDICINE

## 2024-02-13 PROCEDURE — 36415 COLL VENOUS BLD VENIPUNCTURE: CPT | Performed by: INTERNAL MEDICINE

## 2024-02-13 PROCEDURE — 82962 GLUCOSE BLOOD TEST: CPT | Performed by: INTERNAL MEDICINE

## 2024-02-13 PROCEDURE — 83036 HEMOGLOBIN GLYCOSYLATED A1C: CPT | Performed by: INTERNAL MEDICINE

## 2024-02-13 NOTE — PROGRESS NOTES
Name: Desean Lyles  [id number]  Age: 83 y.o.  YOB: 1940  Sex: male    CHIEF COMPLAINT:    Chief Complaint   Patient presents with    Diabetes    Other     Other chronic conditions         HISTORY OF PRESENT ILLNESS:     This is a pleasant  83 y.o. male  is seen today for management of chronic medical problems and medications refills.  Previous records reviewed .    Patient claims that recently he did see Dr. Mccray for routine checkup.  He noticed that he was in atrial fibrillation.  Patient had no symptoms.    EKG on 12/19/2023 showed:  Atrial fib    Echocardiogram on 12/26/2023:    Left Ventricle: Normal left ventricular systolic function with a visually estimated EF of 55 - 60%. Left ventricle size is normal. Mildly increased wall thickness. Normal wall motion.    Aortic Valve: Sclerotic but non stenotic.    Mitral Valve: Mild annular calcification of the mitral valve. Mild regurgitation.    Tricuspid Valve: Mild regurgitation. The estimated RVSP is 32 mmHg.    Pulmonic Valve: Mild regurgitation.    Left Atrium: Left atrium is mildly dilated.    Pericardium: No pericardial effusion.    Image quality is good.    Dr. Gates started him on Eliquis.    He claims he is doing OK .  Denies CP or SOB.  Being followed by Dr. Mccray periodically for his cardiac problems.  No fever , sore throat or cough or congestion.  Denies any abdominal pain.  Appetite OK.  Bowels moving Ok.   Occasionally gets constipated rarely.  No urinary symptoms.  Denies any significant arthritis.  Hearing is ok.  Vision Ok with glasses. Sees Dr. Chandra periodically.  Denies  any significant skin lesions.  Denies any significant depression or anxiety.  Patient claims that he does not check blood sugars at home and he quit doing it because his blood sugars were normal most of the time.  Today his blood sugar is 120 and A1c 5.9.    No other new  complaints.  Patient still did not get flu shot, new COVID-vaccine and

## 2024-02-14 LAB
ALBUMIN SERPL-MCNC: 4.2 G/DL (ref 3.4–5)
ALBUMIN/GLOB SERPL: 1.6 {RATIO} (ref 1.1–2.2)
ALP SERPL-CCNC: 93 U/L (ref 40–129)
ALT SERPL-CCNC: 13 U/L (ref 10–40)
ANION GAP SERPL CALCULATED.3IONS-SCNC: 12 MMOL/L (ref 3–16)
AST SERPL-CCNC: 22 U/L (ref 15–37)
BASOPHILS # BLD: 0.1 K/UL (ref 0–0.2)
BASOPHILS NFR BLD: 0.5 %
BILIRUB SERPL-MCNC: 0.6 MG/DL (ref 0–1)
BUN SERPL-MCNC: 24 MG/DL (ref 7–20)
CALCIUM SERPL-MCNC: 9.5 MG/DL (ref 8.3–10.6)
CHLORIDE SERPL-SCNC: 103 MMOL/L (ref 99–110)
CHOLEST SERPL-MCNC: 133 MG/DL (ref 0–199)
CO2 SERPL-SCNC: 27 MMOL/L (ref 21–32)
CREAT SERPL-MCNC: 1.1 MG/DL (ref 0.8–1.3)
DEPRECATED RDW RBC AUTO: 13.4 % (ref 12.4–15.4)
EOSINOPHIL # BLD: 0.2 K/UL (ref 0–0.6)
EOSINOPHIL NFR BLD: 2.3 %
GFR SERPLBLD CREATININE-BSD FMLA CKD-EPI: >60 ML/MIN/{1.73_M2}
GLUCOSE SERPL-MCNC: 112 MG/DL (ref 70–99)
HCT VFR BLD AUTO: 41.2 % (ref 40.5–52.5)
HDLC SERPL-MCNC: 55 MG/DL (ref 40–60)
HGB BLD-MCNC: 14 G/DL (ref 13.5–17.5)
LDL CHOLESTEROL CALCULATED: 52 MG/DL
LYMPHOCYTES # BLD: 2.1 K/UL (ref 1–5.1)
LYMPHOCYTES NFR BLD: 20.1 %
MCH RBC QN AUTO: 32.6 PG (ref 26–34)
MCHC RBC AUTO-ENTMCNC: 33.9 G/DL (ref 31–36)
MCV RBC AUTO: 96.3 FL (ref 80–100)
MONOCYTES # BLD: 0.7 K/UL (ref 0–1.3)
MONOCYTES NFR BLD: 7.1 %
NEUTROPHILS # BLD: 7.4 K/UL (ref 1.7–7.7)
NEUTROPHILS NFR BLD: 70 %
PLATELET # BLD AUTO: 299 K/UL (ref 135–450)
PMV BLD AUTO: 9 FL (ref 5–10.5)
POTASSIUM SERPL-SCNC: 5.6 MMOL/L (ref 3.5–5.1)
PROT SERPL-MCNC: 6.9 G/DL (ref 6.4–8.2)
RBC # BLD AUTO: 4.28 M/UL (ref 4.2–5.9)
SODIUM SERPL-SCNC: 142 MMOL/L (ref 136–145)
TRIGL SERPL-MCNC: 129 MG/DL (ref 0–150)
TSH SERPL DL<=0.005 MIU/L-ACNC: 1.51 UIU/ML (ref 0.27–4.2)
VLDLC SERPL CALC-MCNC: 26 MG/DL
WBC # BLD AUTO: 10.6 K/UL (ref 4–11)

## 2024-02-15 DIAGNOSIS — E87.5 HYPERKALEMIA: Primary | ICD-10-CM

## 2024-02-21 ENCOUNTER — HOSPITAL ENCOUNTER (OUTPATIENT)
Age: 84
Discharge: HOME | End: 2024-02-21
Payer: MEDICARE

## 2024-02-21 DIAGNOSIS — E87.5 HYPERKALEMIA: ICD-10-CM

## 2024-02-21 LAB
ANION GAP SERPL CALCULATED.3IONS-SCNC: 13 MMOL/L (ref 7–16)
BUN SERPL-MCNC: 23 MG/DL (ref 6–23)
CALCIUM SERPL-MCNC: 9.7 MG/DL (ref 8.3–10.6)
CHLORIDE BLD-SCNC: 101 MMOL/L (ref 99–110)
CO2: 27 MMOL/L (ref 21–32)
CREAT SERPL-MCNC: 1.3 MG/DL (ref 0.9–1.3)
GFR SERPL CREATININE-BSD FRML MDRD: 55 ML/MIN/1.73M2
GLUCOSE SERPL-MCNC: 108 MG/DL (ref 70–99)
POTASSIUM SERPL-SCNC: 5.7 MMOL/L (ref 3.5–5.1)
SODIUM BLD-SCNC: 141 MMOL/L (ref 135–145)

## 2024-02-21 PROCEDURE — 36415 COLL VENOUS BLD VENIPUNCTURE: CPT

## 2024-02-21 PROCEDURE — 80048 BASIC METABOLIC PNL TOTAL CA: CPT

## 2024-02-22 DIAGNOSIS — E87.5 HYPERKALEMIA: Primary | ICD-10-CM

## 2024-03-05 DIAGNOSIS — R53.83 FATIGUE, UNSPECIFIED TYPE: ICD-10-CM

## 2024-03-06 RX ORDER — M-VIT,TX,IRON,MINS/CALC/FOLIC 27MG-0.4MG
1 TABLET ORAL DAILY
Qty: 90 TABLET | Refills: 1 | Status: SHIPPED | OUTPATIENT
Start: 2024-03-06 | End: 2025-03-06

## 2024-03-11 ENCOUNTER — HOSPITAL ENCOUNTER (OUTPATIENT)
Age: 84
Discharge: HOME | End: 2024-03-11
Payer: MEDICARE

## 2024-03-11 DIAGNOSIS — E87.5 HYPERKALEMIA: ICD-10-CM

## 2024-03-11 DIAGNOSIS — N18.30 STAGE 3 CHRONIC KIDNEY DISEASE, UNSPECIFIED WHETHER STAGE 3A OR 3B CKD (HCC): ICD-10-CM

## 2024-03-11 LAB
ANION GAP SERPL CALCULATED.3IONS-SCNC: 14 MMOL/L (ref 7–16)
BUN SERPL-MCNC: 29 MG/DL (ref 6–23)
CALCIUM SERPL-MCNC: 10.1 MG/DL (ref 8.3–10.6)
CHLORIDE BLD-SCNC: 103 MMOL/L (ref 99–110)
CO2: 26 MMOL/L (ref 21–32)
CREAT SERPL-MCNC: 1.3 MG/DL (ref 0.9–1.3)
GFR SERPL CREATININE-BSD FRML MDRD: 55 ML/MIN/1.73M2
GLUCOSE SERPL-MCNC: 111 MG/DL (ref 70–99)
POTASSIUM SERPL-SCNC: 5.9 MMOL/L (ref 3.5–5.1)
REASON FOR REJECTION: NORMAL
REJECTED TEST: NORMAL
SODIUM BLD-SCNC: 143 MMOL/L (ref 135–145)

## 2024-03-11 PROCEDURE — 36415 COLL VENOUS BLD VENIPUNCTURE: CPT

## 2024-03-11 PROCEDURE — 80048 BASIC METABOLIC PNL TOTAL CA: CPT

## 2024-03-12 NOTE — RESULT ENCOUNTER NOTE
TELL PT:  -POTASSIUM LEVEL HIGH AT 5.9 SO TAKE ANOTHER PACKET OF LOKELMA TODAY TOO AND TOMORROW AND THEN FRIDAY  -HAVE YOU HEARD FROM THE [facility]MA[facility] ABOUT THE COST?  THX

## 2024-03-31 DIAGNOSIS — J30.9 ALLERGIC RHINITIS, UNSPECIFIED SEASONALITY, UNSPECIFIED TRIGGER: ICD-10-CM

## 2024-04-01 ENCOUNTER — HOSPITAL ENCOUNTER (OUTPATIENT)
Age: 84
Discharge: HOME | End: 2024-04-01
Payer: MEDICARE

## 2024-04-01 DIAGNOSIS — N18.30 STAGE 3 CHRONIC KIDNEY DISEASE, UNSPECIFIED WHETHER STAGE 3A OR 3B CKD (HCC): ICD-10-CM

## 2024-04-01 DIAGNOSIS — E87.5 HYPERKALEMIA: ICD-10-CM

## 2024-04-01 LAB
ALBUMIN SERPL-MCNC: 4.3 GM/DL (ref 3.4–5)
ANION GAP SERPL CALCULATED.3IONS-SCNC: 13 MMOL/L (ref 7–16)
BUN SERPL-MCNC: 25 MG/DL (ref 6–23)
CALCIUM SERPL-MCNC: 9.8 MG/DL (ref 8.3–10.6)
CHLORIDE BLD-SCNC: 103 MMOL/L (ref 99–110)
CO2: 26 MMOL/L (ref 21–32)
CREAT SERPL-MCNC: 1.1 MG/DL (ref 0.9–1.3)
GFR SERPL CREATININE-BSD FRML MDRD: 67 ML/MIN/1.73M2
GLUCOSE SERPL-MCNC: 97 MG/DL (ref 70–99)
PHOSPHORUS: 3.6 MG/DL (ref 2.5–4.9)
POTASSIUM SERPL-SCNC: 4.5 MMOL/L (ref 3.5–5.1)
SODIUM BLD-SCNC: 142 MMOL/L (ref 135–145)

## 2024-04-01 PROCEDURE — 80069 RENAL FUNCTION PANEL: CPT

## 2024-04-01 PROCEDURE — 36415 COLL VENOUS BLD VENIPUNCTURE: CPT

## 2024-04-01 RX ORDER — MONTELUKAST SODIUM 10 MG/1
TABLET ORAL
Qty: 90 TABLET | Refills: 1 | Status: SHIPPED | OUTPATIENT
Start: 2024-04-01

## 2024-04-01 NOTE — RESULT ENCOUNTER NOTE
TELL PT:  -YOUR POTASSIUM LEVEL IS GOOD AT 4.5, DOWN FROM 5.9  -ARE YOU GETTING THE LOKELMA FROM THE [facility]ARM[facility]ICAL [facility]/  THX

## 2024-05-14 ENCOUNTER — OFFICE VISIT (OUTPATIENT)
Dept: INTERNAL MEDICINE CLINIC | Age: 84
End: 2024-05-14
Payer: MEDICARE

## 2024-05-14 VITALS
SYSTOLIC BLOOD PRESSURE: 118 MMHG | HEIGHT: 70 IN | OXYGEN SATURATION: 99 % | WEIGHT: 209 LBS | BODY MASS INDEX: 29.92 KG/M2 | DIASTOLIC BLOOD PRESSURE: 66 MMHG | HEART RATE: 76 BPM

## 2024-05-14 DIAGNOSIS — E03.4 HYPOTHYROIDISM DUE TO ACQUIRED ATROPHY OF THYROID: ICD-10-CM

## 2024-05-14 DIAGNOSIS — E55.9 VITAMIN D DEFICIENCY: ICD-10-CM

## 2024-05-14 DIAGNOSIS — Z72.0 TOBACCO ABUSE: ICD-10-CM

## 2024-05-14 DIAGNOSIS — E87.5 HYPERKALEMIA: ICD-10-CM

## 2024-05-14 DIAGNOSIS — E11.65 TYPE 2 DIABETES MELLITUS WITH HYPERGLYCEMIA, WITHOUT LONG-TERM CURRENT USE OF INSULIN (HCC): ICD-10-CM

## 2024-05-14 DIAGNOSIS — I48.0 PAROXYSMAL ATRIAL FIBRILLATION (HCC): Primary | ICD-10-CM

## 2024-05-14 DIAGNOSIS — E78.2 MIXED HYPERLIPIDEMIA: ICD-10-CM

## 2024-05-14 DIAGNOSIS — J30.9 ALLERGIC RHINITIS, UNSPECIFIED SEASONALITY, UNSPECIFIED TRIGGER: ICD-10-CM

## 2024-05-14 DIAGNOSIS — I25.119 CORONARY ARTERY DISEASE INVOLVING NATIVE CORONARY ARTERY OF NATIVE HEART WITH ANGINA PECTORIS (HCC): ICD-10-CM

## 2024-05-14 DIAGNOSIS — N18.30 STAGE 3 CHRONIC KIDNEY DISEASE, UNSPECIFIED WHETHER STAGE 3A OR 3B CKD (HCC): ICD-10-CM

## 2024-05-14 DIAGNOSIS — F41.9 ANXIETY: ICD-10-CM

## 2024-05-14 LAB
CHP ED QC CHECK: NORMAL
GLUCOSE BLD-MCNC: 132 MG/DL

## 2024-05-14 PROCEDURE — 3044F HG A1C LEVEL LT 7.0%: CPT | Performed by: INTERNAL MEDICINE

## 2024-05-14 PROCEDURE — 82962 GLUCOSE BLOOD TEST: CPT | Performed by: INTERNAL MEDICINE

## 2024-05-14 PROCEDURE — G8417 CALC BMI ABV UP PARAM F/U: HCPCS | Performed by: INTERNAL MEDICINE

## 2024-05-14 PROCEDURE — 99214 OFFICE O/P EST MOD 30 MIN: CPT | Performed by: INTERNAL MEDICINE

## 2024-05-14 PROCEDURE — G8427 DOCREV CUR MEDS BY ELIG CLIN: HCPCS | Performed by: INTERNAL MEDICINE

## 2024-05-14 PROCEDURE — 4004F PT TOBACCO SCREEN RCVD TLK: CPT | Performed by: INTERNAL MEDICINE

## 2024-05-14 PROCEDURE — 1123F ACP DISCUSS/DSCN MKR DOCD: CPT | Performed by: INTERNAL MEDICINE

## 2024-05-14 RX ORDER — METFORMIN HYDROCHLORIDE 500 MG/1
500 TABLET, EXTENDED RELEASE ORAL
Qty: 90 TABLET | Refills: 1 | Status: SHIPPED | OUTPATIENT
Start: 2024-05-14

## 2024-05-14 RX ORDER — LEVOTHYROXINE SODIUM 0.07 MG/1
75 TABLET ORAL DAILY
Qty: 90 TABLET | Refills: 1 | Status: SHIPPED | OUTPATIENT
Start: 2024-05-14

## 2024-05-14 SDOH — ECONOMIC STABILITY: INCOME INSECURITY: HOW HARD IS IT FOR YOU TO PAY FOR THE VERY BASICS LIKE FOOD, HOUSING, MEDICAL CARE, AND HEATING?: NOT HARD AT ALL

## 2024-05-14 SDOH — ECONOMIC STABILITY: FOOD INSECURITY: WITHIN THE PAST 12 MONTHS, YOU WORRIED THAT YOUR FOOD WOULD RUN OUT BEFORE YOU GOT MONEY TO BUY MORE.: NEVER TRUE

## 2024-05-14 SDOH — ECONOMIC STABILITY: FOOD INSECURITY: WITHIN THE PAST 12 MONTHS, THE FOOD YOU BOUGHT JUST DIDN'T LAST AND YOU DIDN'T HAVE MONEY TO GET MORE.: NEVER TRUE

## 2024-05-14 NOTE — PROGRESS NOTES
Alert, oriented x 3, well developed, cooperative, not in any distress, appears stated age.  HEAD:                         Normocephalic, atraumatic   EYES:                          PERRLA, EOMI, lids normal, conjuctivea clear, sclera anicteric.   NECK:                         Supple, symmetrical,  trachea midline, no thyromegaly, no JVD, no lymphadenopathy.    LUNGS:                       Clear to auscultation bilaterally, respirations unlabored, accessory muscles are not used.  HEART:                       Regular rate and rhythm, S1 and S2 normal, no murmur, rub or gallop. PMI in MCL.  ABDOMEN:                 Soft, non-tender, bowel sounds are normoactive, no masses, no hepatospleenomegaly.  EXTREMITY:              no bipedal edema  NEURO:                      Alert, oriented to person, place and time.                                      Grossly intact.  Musculoskeletal:         No kyphosis or scoliosis, no deformity in any extremity noted, muscle strength and tone are normal.  Skin:                            Warm and dry. No rash or obvious suspicious lesions.                      PSYCH:                       Mood euthymic, insight and judgement good.          ASSESSMENT/PLAN:    1. Paroxysmal atrial fibrillation (HCC)  Continue Eliquis and continue to follow with his cardiologist as per his recommendations    2. Coronary artery disease involving native coronary artery of native heart with angina pectoris (HCC)  Continue Eliquis, Imdur and nitroglycerin sublingual as needed.  Also on co-Q10.    3. Mixed hyperlipidemia  Patient is taking cholesterol medications regularly.  Denies any side effects.  Diet and exercise advised.  Continue Lipitor    4. Stage 3 chronic kidney disease, unspecified whether stage 3a or 3b CKD (HCC)  Avoid NSAID and keep well-hydrated and keep following with his nephrologist as per her recommendation    5. Vitamin D deficiency  Continue vitamin D3    6. Anxiety  Not on any

## 2024-06-28 RX ORDER — ISOSORBIDE MONONITRATE 30 MG/1
30 TABLET, EXTENDED RELEASE ORAL DAILY
Qty: 90 TABLET | Refills: 1 | Status: SHIPPED | OUTPATIENT
Start: 2024-06-28

## 2024-07-03 ENCOUNTER — HOSPITAL ENCOUNTER (OUTPATIENT)
Age: 84
Discharge: HOME | End: 2024-07-03
Payer: MEDICARE

## 2024-07-03 DIAGNOSIS — N18.30 STAGE 3 CHRONIC KIDNEY DISEASE, UNSPECIFIED WHETHER STAGE 3A OR 3B CKD (HCC): ICD-10-CM

## 2024-07-03 DIAGNOSIS — E87.5 HYPERKALEMIA: ICD-10-CM

## 2024-07-03 LAB
ALBUMIN SERPL-MCNC: 4.1 GM/DL (ref 3.4–5)
ANION GAP SERPL CALCULATED.3IONS-SCNC: 12 MMOL/L (ref 7–16)
BUN SERPL-MCNC: 20 MG/DL (ref 6–23)
CALCIUM SERPL-MCNC: 9.3 MG/DL (ref 8.3–10.6)
CHLORIDE BLD-SCNC: 101 MMOL/L (ref 99–110)
CO2: 25 MMOL/L (ref 21–32)
CREAT SERPL-MCNC: 1 MG/DL (ref 0.9–1.3)
GFR, ESTIMATED: 75 ML/MIN/1.73M2
GLUCOSE SERPL-MCNC: 118 MG/DL (ref 70–99)
PHOSPHORUS: 2.8 MG/DL (ref 2.5–4.9)
POTASSIUM SERPL-SCNC: 4.3 MMOL/L (ref 3.5–5.1)
SODIUM BLD-SCNC: 138 MMOL/L (ref 135–145)

## 2024-07-03 PROCEDURE — 36415 COLL VENOUS BLD VENIPUNCTURE: CPT

## 2024-07-03 PROCEDURE — 80069 RENAL FUNCTION PANEL: CPT

## 2024-07-24 ENCOUNTER — OFFICE VISIT (OUTPATIENT)
Dept: CARDIOLOGY CLINIC | Age: 84
End: 2024-07-24
Payer: MEDICARE

## 2024-07-24 VITALS
HEART RATE: 74 BPM | WEIGHT: 201.3 LBS | DIASTOLIC BLOOD PRESSURE: 68 MMHG | SYSTOLIC BLOOD PRESSURE: 108 MMHG | OXYGEN SATURATION: 98 % | HEIGHT: 70 IN | BODY MASS INDEX: 28.82 KG/M2

## 2024-07-24 DIAGNOSIS — I25.119 CORONARY ARTERY DISEASE INVOLVING NATIVE CORONARY ARTERY OF NATIVE HEART WITH ANGINA PECTORIS (HCC): Primary | ICD-10-CM

## 2024-07-24 DIAGNOSIS — E78.2 MIXED HYPERLIPIDEMIA: ICD-10-CM

## 2024-07-24 DIAGNOSIS — Z72.0 TOBACCO ABUSE: ICD-10-CM

## 2024-07-24 DIAGNOSIS — E11.65 TYPE 2 DIABETES MELLITUS WITH HYPERGLYCEMIA, WITHOUT LONG-TERM CURRENT USE OF INSULIN (HCC): ICD-10-CM

## 2024-07-24 DIAGNOSIS — R94.31 ABNORMAL EKG: ICD-10-CM

## 2024-07-24 PROCEDURE — 1123F ACP DISCUSS/DSCN MKR DOCD: CPT | Performed by: INTERNAL MEDICINE

## 2024-07-24 PROCEDURE — 4004F PT TOBACCO SCREEN RCVD TLK: CPT | Performed by: INTERNAL MEDICINE

## 2024-07-24 PROCEDURE — G8427 DOCREV CUR MEDS BY ELIG CLIN: HCPCS | Performed by: INTERNAL MEDICINE

## 2024-07-24 PROCEDURE — 99213 OFFICE O/P EST LOW 20 MIN: CPT | Performed by: INTERNAL MEDICINE

## 2024-07-24 PROCEDURE — G8417 CALC BMI ABV UP PARAM F/U: HCPCS | Performed by: INTERNAL MEDICINE

## 2024-07-24 PROCEDURE — 3044F HG A1C LEVEL LT 7.0%: CPT | Performed by: INTERNAL MEDICINE

## 2024-07-24 NOTE — PROGRESS NOTES
OFFICE PROGRESS NOTES      Desean is a 83 y.o. male who has    CHIEF COMPLAINT AS FOLLOWS:  CHEST PAIN:  Patient denies any C/O chest pains at this time.      SOB:  C/O SOB with exertion as before.              .   LEG EDEMA: No leg edema   PALPITATIONS: Denies any C/O Palpitations   DIZZINESS: No C/O Dizziness   SYNCOPE: None   OTHER/ ADDITIONAL COMPLAINTS:                                     HPI: Patient is here for F/U on his CAD, Atrial Fibrillation & Dyslipidemia problems.   CAD: Patient has known CAD. Had angioplasty / CABG in the past.    Dyslipidemia: Patient has known mixed dyslipidemia. Has been treated with guideline recommended medical / physical/ diet therapy as stated below.                Current Outpatient Medications   Medication Sig Dispense Refill    isosorbide mononitrate (IMDUR) 30 MG extended release tablet Take 1 tablet by mouth daily 90 tablet 1    apixaban (ELIQUIS) 5 MG TABS tablet Take 1 tablet by mouth 2 times daily 60 tablet 5    metFORMIN (GLUCOPHAGE-XR) 500 MG extended release tablet TAKE 1 TABLET BY MOUTH EVERY DAY WITH BREAKFAST 90 tablet 1    levothyroxine (SYNTHROID) 75 MCG tablet TAKE 1 TABLET BY MOUTH EVERY DAY 90 tablet 1    montelukast (SINGULAIR) 10 MG tablet TAKE 1 TABLET BY MOUTH EVERY DAY 90 tablet 1    Magnesium 500 MG CAPS Take 1 capsule by mouth daily      sodium zirconium cyclosilicate (LOKELMA) 10 g PACK oral suspension Take 1 packet by mouth three times a week 30 packet 3    Multiple Vitamins-Minerals (THERAPEUTIC MULTIVITAMIN-MINERALS) tablet Take 1 tablet by mouth daily 90 tablet 1    atorvastatin (LIPITOR) 40 MG tablet Take 1 tablet by mouth daily 90 tablet 3    nitroGLYCERIN (NITROSTAT) 0.4 MG SL tablet Place 1 tablet under the tongue every 5 minutes as needed for Chest pain up to max of 3 total doses. If no relief after 1 dose, call 911. 25 tablet 3    Cholecalciferol (VITAMIN D3) 2000 UNIT CAPS Take  by mouth daily.        coenzyme Q-10 100 MG capsule Take 1

## 2024-07-24 NOTE — PATIENT INSTRUCTIONS
CHF HX: No - 0   HTN HX: No - 0   Stroke/TIA/Thromboembolism No-0   Vascular Disease HX: Yes - 1   Diabetes Mellitus Yes - 1   CHADSVASC 2 Score 4      Annual Stroke Risk 4.8%- moderate-high       Will anticoagulate the patient. HR is well controlled.     TESTS ORDERED:none this visit     PREVIOUSLY ORDERED TESTS REVIEWED & DISCUSSED WITH THE PATIENT:     I personally reviewed & interpreted, all previously ordered tests as copied above. Latest Labs are pulled in to the note with dates.   Labs, specially in Reference to Lipid profile, Cardiac testing in the form of Echo ( dated: ), stress tests ( dated: ) & other relevant cardiac testing reviewed with patient & recommendations made based on assessment of the results.    Discussed role of Cardiac risk factors & effects + treatment of co morbidities with patient & advised accordingly.     MEDICATIONS: List of medications patient is currently taking is reviewed in detail with the patient. Discussed any side effects or problems taking the medication.     Recommend Continue present management & medications as listed.     AFFIRMATION: I spent at least 20 minutes of time reviewing patient's history, previous & current medical problems & all Labs + testing. This includes chart prep even prior to the vosit. Various goals are discussed and multiple questions answered.Relevant concelling performed.     Office follow up in six months.

## 2024-08-13 ENCOUNTER — OFFICE VISIT (OUTPATIENT)
Dept: INTERNAL MEDICINE CLINIC | Age: 84
End: 2024-08-13
Payer: MEDICARE

## 2024-08-13 VITALS
OXYGEN SATURATION: 98 % | WEIGHT: 199.08 LBS | HEIGHT: 70 IN | HEART RATE: 81 BPM | SYSTOLIC BLOOD PRESSURE: 122 MMHG | BODY MASS INDEX: 28.5 KG/M2 | RESPIRATION RATE: 17 BRPM | DIASTOLIC BLOOD PRESSURE: 74 MMHG

## 2024-08-13 VITALS
OXYGEN SATURATION: 98 % | BODY MASS INDEX: 28.49 KG/M2 | HEART RATE: 81 BPM | SYSTOLIC BLOOD PRESSURE: 122 MMHG | HEIGHT: 70 IN | WEIGHT: 199 LBS | DIASTOLIC BLOOD PRESSURE: 74 MMHG

## 2024-08-13 DIAGNOSIS — J30.9 ALLERGIC RHINITIS, UNSPECIFIED SEASONALITY, UNSPECIFIED TRIGGER: ICD-10-CM

## 2024-08-13 DIAGNOSIS — N18.30 STAGE 3 CHRONIC KIDNEY DISEASE, UNSPECIFIED WHETHER STAGE 3A OR 3B CKD (HCC): ICD-10-CM

## 2024-08-13 DIAGNOSIS — F41.9 ANXIETY: ICD-10-CM

## 2024-08-13 DIAGNOSIS — I48.0 PAF (PAROXYSMAL ATRIAL FIBRILLATION) (HCC): ICD-10-CM

## 2024-08-13 DIAGNOSIS — E87.5 HYPERKALEMIA: ICD-10-CM

## 2024-08-13 DIAGNOSIS — E03.4 HYPOTHYROIDISM DUE TO ACQUIRED ATROPHY OF THYROID: ICD-10-CM

## 2024-08-13 DIAGNOSIS — E11.65 TYPE 2 DIABETES MELLITUS WITH HYPERGLYCEMIA, WITHOUT LONG-TERM CURRENT USE OF INSULIN (HCC): Primary | ICD-10-CM

## 2024-08-13 DIAGNOSIS — I25.119 CORONARY ARTERY DISEASE INVOLVING NATIVE CORONARY ARTERY OF NATIVE HEART WITH ANGINA PECTORIS (HCC): ICD-10-CM

## 2024-08-13 DIAGNOSIS — E78.2 MIXED HYPERLIPIDEMIA: ICD-10-CM

## 2024-08-13 DIAGNOSIS — E55.9 VITAMIN D DEFICIENCY: ICD-10-CM

## 2024-08-13 DIAGNOSIS — R06.02 SHORTNESS OF BREATH: ICD-10-CM

## 2024-08-13 DIAGNOSIS — Z00.00 MEDICARE ANNUAL WELLNESS VISIT, SUBSEQUENT: Primary | ICD-10-CM

## 2024-08-13 DIAGNOSIS — Z72.0 TOBACCO ABUSE: ICD-10-CM

## 2024-08-13 LAB
CHP ED QC CHECK: NORMAL
GLUCOSE BLD-MCNC: 125 MG/DL

## 2024-08-13 PROCEDURE — G8427 DOCREV CUR MEDS BY ELIG CLIN: HCPCS | Performed by: INTERNAL MEDICINE

## 2024-08-13 PROCEDURE — 1123F ACP DISCUSS/DSCN MKR DOCD: CPT | Performed by: INTERNAL MEDICINE

## 2024-08-13 PROCEDURE — 99214 OFFICE O/P EST MOD 30 MIN: CPT | Performed by: INTERNAL MEDICINE

## 2024-08-13 PROCEDURE — 82962 GLUCOSE BLOOD TEST: CPT | Performed by: INTERNAL MEDICINE

## 2024-08-13 PROCEDURE — 4004F PT TOBACCO SCREEN RCVD TLK: CPT | Performed by: INTERNAL MEDICINE

## 2024-08-13 PROCEDURE — 3044F HG A1C LEVEL LT 7.0%: CPT | Performed by: INTERNAL MEDICINE

## 2024-08-13 PROCEDURE — G0439 PPPS, SUBSEQ VISIT: HCPCS | Performed by: INTERNAL MEDICINE

## 2024-08-13 PROCEDURE — G8417 CALC BMI ABV UP PARAM F/U: HCPCS | Performed by: INTERNAL MEDICINE

## 2024-08-13 RX ORDER — LORAZEPAM 0.5 MG/1
0.5 TABLET ORAL EVERY 8 HOURS PRN
Qty: 15 TABLET | Refills: 0 | Status: SHIPPED | OUTPATIENT
Start: 2024-08-13 | End: 2024-09-12

## 2024-08-13 RX ORDER — ALBUTEROL SULFATE 90 UG/1
2 AEROSOL, METERED RESPIRATORY (INHALATION) 4 TIMES DAILY PRN
Qty: 1 EACH | Refills: 3 | Status: SHIPPED | OUTPATIENT
Start: 2024-08-13

## 2024-08-13 ASSESSMENT — PATIENT HEALTH QUESTIONNAIRE - PHQ9
SUM OF ALL RESPONSES TO PHQ9 QUESTIONS 1 & 2: 0
SUM OF ALL RESPONSES TO PHQ QUESTIONS 1-9: 0
2. FEELING DOWN, DEPRESSED OR HOPELESS: NOT AT ALL
1. LITTLE INTEREST OR PLEASURE IN DOING THINGS: NOT AT ALL

## 2024-08-13 NOTE — PROGRESS NOTES
Name: Desean Lyles  [id number]  Age: 83 y.o.  YOB: 1940  Sex: male    CHIEF COMPLAINT:    Chief Complaint   Patient presents with    Diabetes    Allergic Rhinitis     Other     Other chronic conditions         HISTORY OF PRESENT ILLNESS:     This is a pleasant  83 y.o. male  is seen today for management of chronic medical problems and medications refills.  Previous records reviewed .    Patient claimed that he is doing okay but gets short of breath easily.  Continues to smoke 4 cigarettes a day.  Okay    Denies CP   Being followed by Dr. Mccray periodically for his cardiac problems.  He was recently diagnosed with atrial fibrillation and now on Eliquis  No fever , sore throat or cough or congestion.    Denies any abdominal pain.  Appetite OK.  Bowels moving Ok.   Occasionally gets constipated rarely.  No urinary symptoms.  Has chronic kidney disease and hyperkalemia for which he is seeing  Dr. Rivero  She recently started him on Lokelma 1 packet every other day and potassium has improved significantly.  Renal functions are also better  Denies any significant arthritis.  Hearing is ok.  Vision Ok with glasses. Sees Dr. Chandra periodically.  Denies  any significant skin lesions.  Denies any significant depression.  But complains of anxiety and wants lorazepam refill.  His last refill of lorazepam 1 was about September 2022.  He takes it very rarely.  PDMP reviewed: No signs of drug abuse.  Patient claims that he does not check blood sugars at home and he quit doing it because his blood sugars were normal most of the time.  Today his blood sugar is 125.  His aic was  5.9 on 2/13/2024.  No other new  complaints.  He recently had shingrex vaccine and new Covid vaccine at the pharmacy.     His labs from  7/3/2024 and 4/1/2024  were reviewed and explained to the patient .    Past Medical History:    Patient Active Problem List   Diagnosis    Type 2 diabetes mellitus (HCC)    Hyperlipidemia

## 2024-08-13 NOTE — PROGRESS NOTES
Medicare Annual Wellness Visit    Desean Lyles is here for Medicare AWV    Assessment & Plan   Medicare annual wellness visit, subsequent  Recommendations for Preventive Services Due: see orders and patient instructions/AVS.  Recommended screening schedule for the next 5-10 years is provided to the patient in written form: see Patient Instructions/AVS.     No follow-ups on file.     Subjective       Patient's complete Health Risk Assessment and screening values have been reviewed and are found in Flowsheets. The following problems were reviewed today and where indicated follow up appointments were made and/or referrals ordered.    Positive Risk Factor Screenings with Interventions:                Inactivity:  On average, how many days per week do you engage in moderate to strenuous exercise (like a brisk walk)?: 0 days (!) Abnormal  On average, how many minutes do you engage in exercise at this level?: 0 min  Interventions:  Patient declined any further interventions or treatment         Safety:  Do you have non-slip mats or non-slip surfaces or shower bars or grab bars in your shower or bathtub?: (!) No  Interventions:  Patient declined any further interventions or treatment      Tobacco Use:    Tobacco Use      Smoking status: Light Smoker        Packs/day: 0.25        Years: 0.3 packs/day for 69.6 years (17.4 ttl pk-yrs)        Types: Cigarettes        Start date: 1/1/1955      Smokeless tobacco: Never      Tobacco comments: Pt reported starting to smoke in 1950's and smokes 4 cigarettes per day reviewed 7/24/24     Interventions:  Patient declined any further intervention or treatment                      Objective   Vitals:    08/13/24 1317   BP: 122/74   Pulse: 81   Resp: 17   SpO2: 98%   Weight: 90.3 kg (199 lb 1.2 oz)   Height: 1.778 m (5' 10\")      Body mass index is 28.56 kg/m².                    Allergies   Allergen Reactions    Buspar [Buspirone Hcl]      Dizziness    Codeine     Shrimp Flavor

## 2024-08-13 NOTE — PATIENT INSTRUCTIONS
history including lifestyle, illnesses that may run in your family, and various assessments and screenings as appropriate.    After reviewing your medical record and screening and assessments performed today your provider may have ordered immunizations, labs, imaging, and/or referrals for you.  A list of these orders (if applicable) as well as your Preventive Care list are included within your After Visit Summary for your review.    Other Preventive Recommendations:    A preventive eye exam performed by an eye specialist is recommended every 1-2 years to screen for glaucoma; cataracts, macular degeneration, and other eye disorders.  A preventive dental visit is recommended every 6 months.  Try to get at least 150 minutes of exercise per week or 10,000 steps per day on a pedometer .  Order or download the FREE \"Exercise & Physical Activity: Your Everyday Guide\" from [employer]. Call 1-360.883.1450 or search [employer] online.  You need 8907-9790 mg of calcium and 4497-6630 IU of vitamin D per day. It is possible to meet your calcium requirement with diet alone, but a vitamin D supplement is usually necessary to meet this goal.  When exposed to the sun, use a sunscreen that protects against both UVA and UVB radiation with an SPF of 30 or greater. Reapply every 2 to 3 hours or after sweating, drying off with a towel, or swimming.  Always wear a seat belt when traveling in a car. Always wear a helmet when riding a bicycle or motorcycle.

## 2024-08-30 DIAGNOSIS — R53.83 FATIGUE, UNSPECIFIED TYPE: ICD-10-CM

## 2024-09-03 RX ORDER — M-VIT,TX,IRON,MINS/CALC/FOLIC 27MG-0.4MG
1 TABLET ORAL DAILY
Qty: 90 TABLET | Refills: 1 | Status: SHIPPED | OUTPATIENT
Start: 2024-09-03 | End: 2025-09-03

## 2024-09-26 DIAGNOSIS — J30.9 ALLERGIC RHINITIS, UNSPECIFIED SEASONALITY, UNSPECIFIED TRIGGER: ICD-10-CM

## 2024-09-26 RX ORDER — MONTELUKAST SODIUM 10 MG/1
TABLET ORAL
Qty: 90 TABLET | Refills: 1 | Status: SHIPPED | OUTPATIENT
Start: 2024-09-26

## 2024-11-08 DIAGNOSIS — E11.65 TYPE 2 DIABETES MELLITUS WITH HYPERGLYCEMIA, WITHOUT LONG-TERM CURRENT USE OF INSULIN (HCC): ICD-10-CM

## 2024-11-08 DIAGNOSIS — E03.4 HYPOTHYROIDISM DUE TO ACQUIRED ATROPHY OF THYROID: ICD-10-CM

## 2024-11-08 RX ORDER — METFORMIN HYDROCHLORIDE 500 MG/1
500 TABLET, EXTENDED RELEASE ORAL
Qty: 90 TABLET | Refills: 1 | Status: SHIPPED | OUTPATIENT
Start: 2024-11-08

## 2024-11-08 RX ORDER — LEVOTHYROXINE SODIUM 75 UG/1
75 TABLET ORAL DAILY
Qty: 90 TABLET | Refills: 1 | Status: SHIPPED | OUTPATIENT
Start: 2024-11-08

## 2024-11-13 ENCOUNTER — OFFICE VISIT (OUTPATIENT)
Dept: INTERNAL MEDICINE CLINIC | Age: 84
End: 2024-11-13

## 2024-11-13 VITALS
DIASTOLIC BLOOD PRESSURE: 65 MMHG | HEART RATE: 80 BPM | SYSTOLIC BLOOD PRESSURE: 102 MMHG | RESPIRATION RATE: 18 BRPM | BODY MASS INDEX: 28.52 KG/M2 | WEIGHT: 198.8 LBS | OXYGEN SATURATION: 100 %

## 2024-11-13 DIAGNOSIS — R06.02 SHORTNESS OF BREATH: ICD-10-CM

## 2024-11-13 DIAGNOSIS — I25.119 CORONARY ARTERY DISEASE INVOLVING NATIVE CORONARY ARTERY OF NATIVE HEART WITH ANGINA PECTORIS (HCC): ICD-10-CM

## 2024-11-13 DIAGNOSIS — E78.2 MIXED HYPERLIPIDEMIA: ICD-10-CM

## 2024-11-13 DIAGNOSIS — E11.65 TYPE 2 DIABETES MELLITUS WITH HYPERGLYCEMIA, WITHOUT LONG-TERM CURRENT USE OF INSULIN (HCC): Primary | ICD-10-CM

## 2024-11-13 DIAGNOSIS — J30.9 ALLERGIC RHINITIS, UNSPECIFIED SEASONALITY, UNSPECIFIED TRIGGER: ICD-10-CM

## 2024-11-13 DIAGNOSIS — E55.9 VITAMIN D DEFICIENCY: ICD-10-CM

## 2024-11-13 DIAGNOSIS — F41.9 ANXIETY: ICD-10-CM

## 2024-11-13 DIAGNOSIS — I48.0 PAF (PAROXYSMAL ATRIAL FIBRILLATION) (HCC): ICD-10-CM

## 2024-11-13 DIAGNOSIS — Z72.0 TOBACCO ABUSE: ICD-10-CM

## 2024-11-13 DIAGNOSIS — E87.5 HYPERKALEMIA: ICD-10-CM

## 2024-11-13 DIAGNOSIS — N18.30 STAGE 3 CHRONIC KIDNEY DISEASE, UNSPECIFIED WHETHER STAGE 3A OR 3B CKD (HCC): ICD-10-CM

## 2024-11-13 DIAGNOSIS — E03.4 HYPOTHYROIDISM DUE TO ACQUIRED ATROPHY OF THYROID: ICD-10-CM

## 2024-11-13 LAB
CHP ED QC CHECK: NORMAL
GLUCOSE BLD-MCNC: 113 MG/DL

## 2024-11-13 RX ORDER — ALBUTEROL SULFATE 90 UG/1
2 INHALANT RESPIRATORY (INHALATION) 4 TIMES DAILY PRN
Qty: 1 EACH | Refills: 3 | Status: SHIPPED | OUTPATIENT
Start: 2024-11-13

## 2024-11-13 NOTE — PROGRESS NOTES
Name: Desean Lyles  [id number]  Age: 84 y.o.  YOB: 1940  Sex: male    CHIEF COMPLAINT:    Chief Complaint   Patient presents with    3 Month Follow-Up     Routine visit, no new concerns         HISTORY OF PRESENT ILLNESS:     This is a pleasant  84 y.o. male  is seen today for management of chronic medical problems and medications refills.  Previous records reviewed .    Patient claimed that he is doing okay but gets short of breath easily.  Albuterol HFA PRN helps. Also taking Singulair.    Continues to smoke 4 cigarettes a day.  .     Denies CP   Being followed by Dr. Mccray periodically for his cardiac problems.  He was diagnosed with atrial fibrillation and now on Eliquis  No fever , sore throat or cough or congestion.     Denies any abdominal pain.  Appetite OK.  Bowels moving Ok.   Occasionally gets constipated rarely.    No urinary symptoms.  Has chronic kidney disease and hyperkalemia for which he is seeing  Dr. Rivero  She recently started him on Lokelma 1 packet every other day and potassium has improved significantly.    Renal functions are also better  Denies any significant arthritis.  Hearing is poor and going to start using hearing aides.  Vision Ok with glasses. Sees Dr. Chandra periodically.  Denies  any significant skin lesions.  Denies any significant depression.  But complains of anxiety occasionally.  Takes Lorazepam rarely.   Does not want any refills today..  Patient claims that he does not check blood sugars at home and he quit doing it because his blood sugars were normal most of the time.  Today his blood sugar is 113  His aic was  5.9 on 2/13/2024.  No other new  complaints.  He recently had shingrex vaccine and new Covid vaccine at the pharmacy.     His labs from  7/3/2024 and 4/1/2024  were reviewed and explained to the patient .      Past Medical History:    Patient Active Problem List   Diagnosis    Type 2 diabetes mellitus (HCC)    Hyperlipidemia    Diverticulosis

## 2024-12-26 RX ORDER — ATORVASTATIN CALCIUM 40 MG/1
40 TABLET, FILM COATED ORAL DAILY
Qty: 90 TABLET | Refills: 3 | Status: SHIPPED | OUTPATIENT
Start: 2024-12-26

## 2024-12-26 RX ORDER — ISOSORBIDE MONONITRATE 30 MG/1
30 TABLET, EXTENDED RELEASE ORAL DAILY
Qty: 90 TABLET | Refills: 3 | Status: SHIPPED | OUTPATIENT
Start: 2024-12-26

## 2025-01-21 ENCOUNTER — HOSPITAL ENCOUNTER (OUTPATIENT)
Age: 85
Discharge: HOME | End: 2025-01-21
Payer: MEDICARE

## 2025-01-21 DIAGNOSIS — E87.5 HYPERKALEMIA: ICD-10-CM

## 2025-01-21 DIAGNOSIS — N18.30 STAGE 3 CHRONIC KIDNEY DISEASE, UNSPECIFIED WHETHER STAGE 3A OR 3B CKD (HCC): ICD-10-CM

## 2025-01-21 LAB
ALBUMIN: 4.3 G/DL (ref 3.4–5)
ANION GAP SERPL CALCULATED.3IONS-SCNC: 13 MMOL/L (ref 9–17)
BUN SERPL-MCNC: 19 MG/DL (ref 7–20)
CALCIUM SERPL-MCNC: 10.4 MG/DL (ref 8.3–10.6)
CHLORIDE SERPL-SCNC: 100 MMOL/L (ref 99–110)
CO2 SERPL-SCNC: 25 MMOL/L (ref 21–32)
CREAT SERPL-MCNC: 1.1 MG/DL (ref 0.8–1.3)
GFR, ESTIMATED: 61 ML/MIN/1.73M2
GLUCOSE SERPL-MCNC: 116 MG/DL (ref 74–99)
PHOSPHATE SERPL-MCNC: 4.1 MG/DL (ref 2.5–4.9)
POTASSIUM SERPL-SCNC: 5.5 MMOL/L (ref 3.5–5.1)
SODIUM SERPL-SCNC: 139 MMOL/L (ref 136–145)

## 2025-01-21 PROCEDURE — 80069 RENAL FUNCTION PANEL: CPT

## 2025-01-22 ENCOUNTER — HOSPITAL ENCOUNTER (OUTPATIENT)
Age: 85
Setting detail: SPECIMEN
Discharge: HOME | End: 2025-01-22
Payer: MEDICARE

## 2025-01-22 LAB
BILIRUB UR QL STRIP: NEGATIVE
CLARITY UR: CLEAR
COLOR UR: YELLOW
COMMENT: NORMAL
CREAT UR-MCNC: 134 MG/DL (ref 39–259)
GLUCOSE UR STRIP-MCNC: NEGATIVE MG/DL
HGB UR QL STRIP.AUTO: NEGATIVE
KETONES UR STRIP-MCNC: NEGATIVE MG/DL
LEUKOCYTE ESTERASE UR QL STRIP: NEGATIVE
NITRITE UR QL STRIP: NEGATIVE
PH UR STRIP: 6 [PH] (ref 5–8)
PROT UR STRIP-MCNC: NEGATIVE MG/DL
SP GR UR STRIP: 1.02 (ref 1–1.03)
TOTAL PROTEIN, URINE: 19 MG/DL
URINE TOTAL PROTEIN CREATININE RATIO: 0.14 (ref 0–0.2)
UROBILINOGEN UR STRIP-ACNC: 0.2 EU/DL (ref 0–1)

## 2025-01-22 PROCEDURE — 84156 ASSAY OF PROTEIN URINE: CPT

## 2025-01-22 PROCEDURE — 81003 URINALYSIS AUTO W/O SCOPE: CPT

## 2025-01-22 PROCEDURE — 82570 ASSAY OF URINE CREATININE: CPT

## 2025-01-24 NOTE — PROGRESS NOTES
09/15/2020    TSH 1.51 02/13/2024     Hemoglobin A1C   Date Value Ref Range Status   02/13/2024 5.9 % Final

## 2025-01-28 ENCOUNTER — OFFICE VISIT (OUTPATIENT)
Dept: CARDIOLOGY CLINIC | Age: 85
End: 2025-01-28
Payer: MEDICARE

## 2025-01-28 VITALS
BODY MASS INDEX: 28.98 KG/M2 | SYSTOLIC BLOOD PRESSURE: 120 MMHG | DIASTOLIC BLOOD PRESSURE: 62 MMHG | HEART RATE: 88 BPM | WEIGHT: 202.4 LBS | HEIGHT: 70 IN

## 2025-01-28 DIAGNOSIS — I25.119 CORONARY ARTERY DISEASE INVOLVING NATIVE CORONARY ARTERY OF NATIVE HEART WITH ANGINA PECTORIS (HCC): Primary | ICD-10-CM

## 2025-01-28 PROCEDURE — 99213 OFFICE O/P EST LOW 20 MIN: CPT | Performed by: INTERNAL MEDICINE

## 2025-01-28 PROCEDURE — 4004F PT TOBACCO SCREEN RCVD TLK: CPT | Performed by: INTERNAL MEDICINE

## 2025-01-28 PROCEDURE — 93000 ELECTROCARDIOGRAM COMPLETE: CPT | Performed by: INTERNAL MEDICINE

## 2025-01-28 PROCEDURE — G8427 DOCREV CUR MEDS BY ELIG CLIN: HCPCS | Performed by: INTERNAL MEDICINE

## 2025-01-28 PROCEDURE — 1159F MED LIST DOCD IN RCRD: CPT | Performed by: INTERNAL MEDICINE

## 2025-01-28 PROCEDURE — G8417 CALC BMI ABV UP PARAM F/U: HCPCS | Performed by: INTERNAL MEDICINE

## 2025-01-28 PROCEDURE — 1123F ACP DISCUSS/DSCN MKR DOCD: CPT | Performed by: INTERNAL MEDICINE

## 2025-01-28 PROCEDURE — 1160F RVW MEDS BY RX/DR IN RCRD: CPT | Performed by: INTERNAL MEDICINE

## 2025-01-28 NOTE — PATIENT INSTRUCTIONS
CORONARY ARTERY DISEASE:Yes  clinically stable. Patient is on optimal medical regimen ( see medication list above )  Patient is currently  asymptomatic from CAD.   -changes in  treatment:  no,on ASA & iMDUR   -Testing ordered:  no  Candler classification: 3  CATH 7/28/2021   Most significant stenosis is in RCA, it is 50 to 60 % proximal   stenosis.   Normal LV systolic function. LVEF is 50 to 55 %.     12/26/2023  This is a normal Cardiolite perfusion imaging test.  Patient's baseline EKG reveals atrial flutter with nonspecific ST-T abnormalities only.  Cardiolite perfusion imaging performed reveals a normal Cardiolite uptake in all all myocardial territories without any findings suggestive of myocardial infarction or reversible ischemia.  Inferior wall abnormality seen is consistent with diaphragmatic attenuation artifact.     There is mild global hypokinesis noted with mildly reduced LV function ejection fraction is 44%.     HTN:No History  VHD: NONE SIGNIFICANT.  ECHO 12/26/2023    Left Ventricle: Normal left ventricular systolic function with a visually estimated EF of 55 - 60%. Left ventricle size is normal. Mildly increased wall thickness. Normal wall motion.    Aortic Valve: Sclerotic but non stenotic.    Mitral Valve: Mild annular calcification of the mitral valve. Mild regurgitation.    Tricuspid Valve: Mild regurgitation. The estimated RVSP is 32 mmHg.    Pulmonic Valve: Mild regurgitation.    Left Atrium: Left atrium is mildly dilated.    Pericardium: No pericardial effusion.    Image quality is good.     DYSLIPIDEMIA: yes,   Patient's profile is at / near Goal.yes,   Tolerating current medical regimen wellyes, Takes Lipitor  See most recent Lab values in Labs section above.  Diabetes mellitis:yes,   BS under good control yes,   Hgb A1c avilable yes,      ATRIAL FIBRILLATION: New diagnosis.  EKG: A-Fib 88 / min, LAFB  Atrial Fibrillation CHADSVASC2 Score Stroke Risk:   83 y.o. > 75 - 2    male Male - 0

## 2025-01-28 NOTE — PROGRESS NOTES
OFFICE PROGRESS NOTES      Desean is a 84 y.o. male who has    CHIEF COMPLAINT AS FOLLOWS:  CHEST PAIN:  Patient denies any C/O chest pains at this time.      SOB:  C/O SOB with exertion as before.              .   LEG EDEMA: No leg edema   PALPITATIONS: Denies any C/O Palpitations   DIZZINESS: No C/O Dizziness   SYNCOPE: None   OTHER/ ADDITIONAL COMPLAINTS:                                     HPI: Patient is here for F/U on his CAD,  Arrhythmia & Dyslipidemia problems.   CAD: Patient has known CAD.   Arrhythmia: Patient has known  A-Fib.  Dyslipidemia: Patient has known mixed dyslipidemia. Has been treated with guideline recommended medical / physical/ diet therapy as stated below.                Current Outpatient Medications   Medication Sig Dispense Refill    atorvastatin (LIPITOR) 40 MG tablet Take 1 tablet by mouth daily 90 tablet 3    isosorbide mononitrate (IMDUR) 30 MG extended release tablet Take 1 tablet by mouth daily 90 tablet 3    apixaban (ELIQUIS) 5 MG TABS tablet Take 1 tablet by mouth 2 times daily 60 tablet 5    albuterol sulfate HFA (VENTOLIN HFA) 108 (90 Base) MCG/ACT inhaler Inhale 2 puffs into the lungs 4 times daily as needed for Wheezing or Shortness of Breath 1 each 3    levothyroxine (SYNTHROID) 75 MCG tablet TAKE 1 TABLET BY MOUTH EVERY DAY 90 tablet 1    metFORMIN (GLUCOPHAGE-XR) 500 MG extended release tablet TAKE 1 TABLET BY MOUTH EVERY DAY WITH BREAKFAST 90 tablet 1    montelukast (SINGULAIR) 10 MG tablet TAKE 1 TABLET BY MOUTH EVERY DAY 90 tablet 1    Multiple Vitamins-Minerals (THERAPEUTIC MULTIVITAMIN-MINERALS) tablet Take 1 tablet by mouth daily 90 tablet 1    Magnesium 500 MG CAPS Take 1 capsule by mouth daily      sodium zirconium cyclosilicate (LOKELMA) 10 g PACK oral suspension Take 1 packet by mouth three times a week 30 packet 3    nitroGLYCERIN (NITROSTAT) 0.4 MG SL tablet Place 1 tablet under the tongue every 5 minutes as needed for Chest pain up to max of 3 total doses.

## 2025-02-10 SDOH — ECONOMIC STABILITY: TRANSPORTATION INSECURITY
IN THE PAST 12 MONTHS, HAS THE LACK OF TRANSPORTATION KEPT YOU FROM MEDICAL APPOINTMENTS OR FROM GETTING MEDICATIONS?: NO

## 2025-02-10 SDOH — ECONOMIC STABILITY: TRANSPORTATION INSECURITY
IN THE PAST 12 MONTHS, HAS LACK OF TRANSPORTATION KEPT YOU FROM MEETINGS, WORK, OR FROM GETTING THINGS NEEDED FOR DAILY LIVING?: NO

## 2025-02-10 SDOH — ECONOMIC STABILITY: FOOD INSECURITY: WITHIN THE PAST 12 MONTHS, THE FOOD YOU BOUGHT JUST DIDN'T LAST AND YOU DIDN'T HAVE MONEY TO GET MORE.: NEVER TRUE

## 2025-02-10 SDOH — ECONOMIC STABILITY: INCOME INSECURITY: IN THE LAST 12 MONTHS, WAS THERE A TIME WHEN YOU WERE NOT ABLE TO PAY THE MORTGAGE OR RENT ON TIME?: NO

## 2025-02-10 SDOH — ECONOMIC STABILITY: FOOD INSECURITY: WITHIN THE PAST 12 MONTHS, YOU WORRIED THAT YOUR FOOD WOULD RUN OUT BEFORE YOU GOT MONEY TO BUY MORE.: NEVER TRUE

## 2025-02-10 ASSESSMENT — PATIENT HEALTH QUESTIONNAIRE - PHQ9
SUM OF ALL RESPONSES TO PHQ9 QUESTIONS 1 & 2: 0
2. FEELING DOWN, DEPRESSED OR HOPELESS: NOT AT ALL
1. LITTLE INTEREST OR PLEASURE IN DOING THINGS: NOT AT ALL
SUM OF ALL RESPONSES TO PHQ9 QUESTIONS 1 & 2: 0
SUM OF ALL RESPONSES TO PHQ QUESTIONS 1-9: 0
1. LITTLE INTEREST OR PLEASURE IN DOING THINGS: NOT AT ALL
SUM OF ALL RESPONSES TO PHQ QUESTIONS 1-9: 0
SUM OF ALL RESPONSES TO PHQ QUESTIONS 1-9: 0
2. FEELING DOWN, DEPRESSED OR HOPELESS: NOT AT ALL
SUM OF ALL RESPONSES TO PHQ QUESTIONS 1-9: 0

## 2025-02-11 ENCOUNTER — OFFICE VISIT (OUTPATIENT)
Dept: INTERNAL MEDICINE CLINIC | Age: 85
End: 2025-02-11
Payer: MEDICARE

## 2025-02-11 VITALS
SYSTOLIC BLOOD PRESSURE: 102 MMHG | DIASTOLIC BLOOD PRESSURE: 70 MMHG | WEIGHT: 198.8 LBS | BODY MASS INDEX: 28.52 KG/M2 | OXYGEN SATURATION: 96 % | HEART RATE: 84 BPM

## 2025-02-11 DIAGNOSIS — I48.0 PAF (PAROXYSMAL ATRIAL FIBRILLATION) (HCC): ICD-10-CM

## 2025-02-11 DIAGNOSIS — E11.65 TYPE 2 DIABETES MELLITUS WITH HYPERGLYCEMIA, WITHOUT LONG-TERM CURRENT USE OF INSULIN (HCC): ICD-10-CM

## 2025-02-11 DIAGNOSIS — F41.9 ANXIETY: ICD-10-CM

## 2025-02-11 DIAGNOSIS — E87.5 HYPERKALEMIA: ICD-10-CM

## 2025-02-11 DIAGNOSIS — J30.9 ALLERGIC RHINITIS, UNSPECIFIED SEASONALITY, UNSPECIFIED TRIGGER: ICD-10-CM

## 2025-02-11 DIAGNOSIS — Z72.0 TOBACCO ABUSE: ICD-10-CM

## 2025-02-11 DIAGNOSIS — E03.4 HYPOTHYROIDISM DUE TO ACQUIRED ATROPHY OF THYROID: ICD-10-CM

## 2025-02-11 DIAGNOSIS — N18.30 STAGE 3 CHRONIC KIDNEY DISEASE, UNSPECIFIED WHETHER STAGE 3A OR 3B CKD (HCC): ICD-10-CM

## 2025-02-11 DIAGNOSIS — E78.2 MIXED HYPERLIPIDEMIA: ICD-10-CM

## 2025-02-11 DIAGNOSIS — I25.119 CORONARY ARTERY DISEASE INVOLVING NATIVE CORONARY ARTERY OF NATIVE HEART WITH ANGINA PECTORIS (HCC): Primary | ICD-10-CM

## 2025-02-11 DIAGNOSIS — E55.9 VITAMIN D DEFICIENCY: ICD-10-CM

## 2025-02-11 LAB
CHP ED QC CHECK: NORMAL
GLUCOSE BLD-MCNC: 129 MG/DL
HBA1C MFR BLD: 5.9 %

## 2025-02-11 PROCEDURE — 83036 HEMOGLOBIN GLYCOSYLATED A1C: CPT | Performed by: INTERNAL MEDICINE

## 2025-02-11 PROCEDURE — 1159F MED LIST DOCD IN RCRD: CPT | Performed by: INTERNAL MEDICINE

## 2025-02-11 PROCEDURE — 3044F HG A1C LEVEL LT 7.0%: CPT | Performed by: INTERNAL MEDICINE

## 2025-02-11 PROCEDURE — G8427 DOCREV CUR MEDS BY ELIG CLIN: HCPCS | Performed by: INTERNAL MEDICINE

## 2025-02-11 PROCEDURE — 82962 GLUCOSE BLOOD TEST: CPT | Performed by: INTERNAL MEDICINE

## 2025-02-11 PROCEDURE — G8417 CALC BMI ABV UP PARAM F/U: HCPCS | Performed by: INTERNAL MEDICINE

## 2025-02-11 PROCEDURE — 99214 OFFICE O/P EST MOD 30 MIN: CPT | Performed by: INTERNAL MEDICINE

## 2025-02-11 PROCEDURE — 1123F ACP DISCUSS/DSCN MKR DOCD: CPT | Performed by: INTERNAL MEDICINE

## 2025-02-11 PROCEDURE — 4004F PT TOBACCO SCREEN RCVD TLK: CPT | Performed by: INTERNAL MEDICINE

## 2025-02-27 DIAGNOSIS — R53.83 FATIGUE, UNSPECIFIED TYPE: ICD-10-CM

## 2025-02-27 RX ORDER — M-VIT,TX,IRON,MINS/CALC/FOLIC 27MG-0.4MG
1 TABLET ORAL DAILY
Qty: 90 TABLET | Refills: 1 | Status: SHIPPED | OUTPATIENT
Start: 2025-02-27 | End: 2026-02-27

## 2025-02-28 DIAGNOSIS — J30.9 ALLERGIC RHINITIS, UNSPECIFIED SEASONALITY, UNSPECIFIED TRIGGER: ICD-10-CM

## 2025-02-28 DIAGNOSIS — E11.65 TYPE 2 DIABETES MELLITUS WITH HYPERGLYCEMIA, WITHOUT LONG-TERM CURRENT USE OF INSULIN (HCC): ICD-10-CM

## 2025-02-28 DIAGNOSIS — E03.4 HYPOTHYROIDISM DUE TO ACQUIRED ATROPHY OF THYROID: ICD-10-CM

## 2025-02-28 RX ORDER — METFORMIN HYDROCHLORIDE 500 MG/1
500 TABLET, EXTENDED RELEASE ORAL
Qty: 90 TABLET | Refills: 1 | Status: SHIPPED | OUTPATIENT
Start: 2025-02-28

## 2025-02-28 RX ORDER — LEVOTHYROXINE SODIUM 75 UG/1
75 TABLET ORAL DAILY
Qty: 90 TABLET | Refills: 1 | Status: SHIPPED | OUTPATIENT
Start: 2025-02-28

## 2025-02-28 RX ORDER — MONTELUKAST SODIUM 10 MG/1
TABLET ORAL
Qty: 90 TABLET | Refills: 1 | Status: SHIPPED | OUTPATIENT
Start: 2025-02-28

## 2025-05-12 SDOH — HEALTH STABILITY: PHYSICAL HEALTH: ON AVERAGE, HOW MANY DAYS PER WEEK DO YOU ENGAGE IN MODERATE TO STRENUOUS EXERCISE (LIKE A BRISK WALK)?: 0 DAYS

## 2025-05-12 ASSESSMENT — LIFESTYLE VARIABLES
HOW MANY STANDARD DRINKS CONTAINING ALCOHOL DO YOU HAVE ON A TYPICAL DAY: 0
HOW OFTEN DO YOU HAVE A DRINK CONTAINING ALCOHOL: 1
HOW OFTEN DO YOU HAVE SIX OR MORE DRINKS ON ONE OCCASION: 1
HOW OFTEN DO YOU HAVE A DRINK CONTAINING ALCOHOL: NEVER
HOW MANY STANDARD DRINKS CONTAINING ALCOHOL DO YOU HAVE ON A TYPICAL DAY: PATIENT DOES NOT DRINK

## 2025-05-12 ASSESSMENT — PATIENT HEALTH QUESTIONNAIRE - PHQ9
SUM OF ALL RESPONSES TO PHQ QUESTIONS 1-9: 0
2. FEELING DOWN, DEPRESSED OR HOPELESS: NOT AT ALL
1. LITTLE INTEREST OR PLEASURE IN DOING THINGS: NOT AT ALL
SUM OF ALL RESPONSES TO PHQ QUESTIONS 1-9: 0

## 2025-05-13 ENCOUNTER — OFFICE VISIT (OUTPATIENT)
Dept: INTERNAL MEDICINE CLINIC | Age: 85
End: 2025-05-13
Payer: MEDICARE

## 2025-05-13 VITALS
SYSTOLIC BLOOD PRESSURE: 122 MMHG | WEIGHT: 200.6 LBS | OXYGEN SATURATION: 99 % | HEIGHT: 70 IN | DIASTOLIC BLOOD PRESSURE: 68 MMHG | HEART RATE: 77 BPM | BODY MASS INDEX: 28.72 KG/M2

## 2025-05-13 DIAGNOSIS — Z12.5 SCREENING FOR PROSTATE CANCER: ICD-10-CM

## 2025-05-13 DIAGNOSIS — I48.0 PAF (PAROXYSMAL ATRIAL FIBRILLATION) (HCC): ICD-10-CM

## 2025-05-13 DIAGNOSIS — Z00.00 MEDICARE ANNUAL WELLNESS VISIT, SUBSEQUENT: ICD-10-CM

## 2025-05-13 DIAGNOSIS — E78.2 MIXED HYPERLIPIDEMIA: ICD-10-CM

## 2025-05-13 DIAGNOSIS — E55.9 VITAMIN D DEFICIENCY: ICD-10-CM

## 2025-05-13 DIAGNOSIS — E11.65 TYPE 2 DIABETES MELLITUS WITH HYPERGLYCEMIA, WITHOUT LONG-TERM CURRENT USE OF INSULIN (HCC): Primary | ICD-10-CM

## 2025-05-13 DIAGNOSIS — R21 RASH: ICD-10-CM

## 2025-05-13 DIAGNOSIS — J30.9 ALLERGIC RHINITIS, UNSPECIFIED SEASONALITY, UNSPECIFIED TRIGGER: ICD-10-CM

## 2025-05-13 DIAGNOSIS — F41.9 ANXIETY: ICD-10-CM

## 2025-05-13 DIAGNOSIS — E03.4 HYPOTHYROIDISM DUE TO ACQUIRED ATROPHY OF THYROID: ICD-10-CM

## 2025-05-13 DIAGNOSIS — I25.119 CORONARY ARTERY DISEASE INVOLVING NATIVE CORONARY ARTERY OF NATIVE HEART WITH ANGINA PECTORIS: ICD-10-CM

## 2025-05-13 DIAGNOSIS — N18.30 STAGE 3 CHRONIC KIDNEY DISEASE, UNSPECIFIED WHETHER STAGE 3A OR 3B CKD (HCC): ICD-10-CM

## 2025-05-13 DIAGNOSIS — B37.2 MONILIAL INTERTRIGO: ICD-10-CM

## 2025-05-13 PROCEDURE — G0439 PPPS, SUBSEQ VISIT: HCPCS | Performed by: INTERNAL MEDICINE

## 2025-05-13 PROCEDURE — 1123F ACP DISCUSS/DSCN MKR DOCD: CPT | Performed by: INTERNAL MEDICINE

## 2025-05-13 PROCEDURE — 82962 GLUCOSE BLOOD TEST: CPT | Performed by: INTERNAL MEDICINE

## 2025-05-13 PROCEDURE — G8417 CALC BMI ABV UP PARAM F/U: HCPCS | Performed by: INTERNAL MEDICINE

## 2025-05-13 PROCEDURE — 4004F PT TOBACCO SCREEN RCVD TLK: CPT | Performed by: INTERNAL MEDICINE

## 2025-05-13 PROCEDURE — 3044F HG A1C LEVEL LT 7.0%: CPT | Performed by: INTERNAL MEDICINE

## 2025-05-13 PROCEDURE — 1160F RVW MEDS BY RX/DR IN RCRD: CPT | Performed by: INTERNAL MEDICINE

## 2025-05-13 PROCEDURE — 1159F MED LIST DOCD IN RCRD: CPT | Performed by: INTERNAL MEDICINE

## 2025-05-13 PROCEDURE — G8427 DOCREV CUR MEDS BY ELIG CLIN: HCPCS | Performed by: INTERNAL MEDICINE

## 2025-05-13 PROCEDURE — 99214 OFFICE O/P EST MOD 30 MIN: CPT | Performed by: INTERNAL MEDICINE

## 2025-05-13 RX ORDER — TRIAMCINOLONE ACETONIDE 1 MG/G
CREAM TOPICAL 2 TIMES DAILY
COMMUNITY
End: 2025-05-13 | Stop reason: SDUPTHER

## 2025-05-13 RX ORDER — CLOTRIMAZOLE AND BETAMETHASONE DIPROPIONATE 10; .64 MG/G; MG/G
CREAM TOPICAL
Qty: 45 G | Refills: 1 | Status: SHIPPED | OUTPATIENT
Start: 2025-05-13

## 2025-05-13 RX ORDER — TRIAMCINOLONE ACETONIDE 1 MG/G
CREAM TOPICAL 2 TIMES DAILY
Qty: 80 G | Refills: 0 | Status: SHIPPED | OUTPATIENT
Start: 2025-05-13

## 2025-05-13 RX ORDER — LORAZEPAM 0.5 MG/1
0.5 TABLET ORAL DAILY PRN
Qty: 15 TABLET | Refills: 0 | Status: SHIPPED | OUTPATIENT
Start: 2025-05-13 | End: 2025-06-12

## 2025-05-13 NOTE — PROGRESS NOTES
Medicare Annual Wellness Visit    Desean Lyles is here for 3 Month Follow-Up    Assessment & Plan   Type 2 diabetes mellitus with hyperglycemia, without long-term current use of insulin (HCC)  -     POCT Glucose  -     ESTABLISHED, MOD MDM, 30-39 MIN [51744]  -     Albumin/Creatinine Ratio, Urine  Hypothyroidism due to acquired atrophy of thyroid  -     ESTABLISHED, MOD MDM, 30-39 MIN [[id number]]  -     TSH  Allergic rhinitis, unspecified seasonality, unspecified trigger  -     ESTABLISHED, MOD MDM, 30-39 MIN [57852]  -     CBC with Auto Differential  Coronary artery disease involving native coronary artery of native heart with angina pectoris  -     ESTABLISHED, MOD MDM, 30-39 MIN [[id number]]  PAF (paroxysmal atrial fibrillation) (HCC)  -     ESTABLISHED, MOD MDM, 30-39 MIN [65393]  Mixed hyperlipidemia  -     ESTABLISHED, MOD MDM, 30-39 MIN [[id number]]  -     Comprehensive Metabolic Panel  -     Lipid, Fasting  Stage 3 chronic kidney disease, unspecified whether stage 3a or 3b CKD (HCC)  -     ESTABLISHED, MOD MDM, 30-39 MIN [[id number]]  Vitamin D deficiency  -     ESTABLISHED, MOD MDM, 30-39 MIN [[id number]]  Medicare annual wellness visit, subsequent  -     ESTABLISHED, MOD MDM, 30-39 MIN [[id number]]  Monilial intertrigo  -     clotrimazole-betamethasone (LOTRISONE) 1-0.05 % cream; Apply topically 2 times daily., Disp-45 g, R-1, Normal  Rash  -     triamcinolone (KENALOG) 0.1 % cream; Apply topically 2 times daily Apply topically 2 times daily., Topical, 2 TIMES DAILY Starting Tue 5/13/2025, Disp-80 g, R-0, Normal  Screening for prostate cancer  -     PSA Screening  Anxiety  -     LORazepam (ATIVAN) 0.5 MG tablet; Take 1 tablet by mouth daily as needed for Anxiety for up to 30 days. Max Daily Amount: 0.5 mg, Disp-15 tablet, R-0Normal       Return in 1 year (on 5/13/2026) for Medicare AWV.     Subjective       Patient's complete Health Risk Assessment and screening values have been reviewed and are found in Flowsheets. The

## 2025-05-13 NOTE — PATIENT INSTRUCTIONS
their teeth on their own, you may need to brush and floss their teeth for them. It may be easiest to have the person sit and face away from you, and to sit or stand behind them. That way you can steady their head against your arm as you reach around to floss and brush their teeth. Choose a place that has good lighting and is comfortable for both of you.  Before you begin, gather your supplies. You will need gloves, floss, a toothbrush, and a container to hold water if you are not near a sink. Wash and dry your hands well and put on gloves. Start by flossing:  Gently work a piece of floss between each of the teeth toward the gums. A plastic flossing tool may make this easier, and they are available at [facility].  Curve the floss around each tooth into a U-shape and gently slide it under the gum line.  Move the floss firmly up and down several times to scrape off the plaque.  After you've finished flossing, throw away the used floss and begin brushing:  Wet the brush and apply toothpaste.  Place the brush at a 45-degree angle where the teeth meet the gums. Press firmly, and move the brush in small circles over the surface of the teeth.  Be careful not to brush too hard. Vigorous brushing can make the gums pull away from the teeth and can scratch the tooth enamel.  Brush all surfaces of the teeth, on the tongue side and on the cheek side. Pay special attention to the front teeth and all surfaces of the back teeth.  Brush chewing surfaces with short back-and-forth strokes.  After you've finished, help the person rinse the remaining toothpaste from their mouth.  Where can you learn more?  Go to https://www.[facility].net/patientEd and enter F944 to learn more about \"Learning About Dental Care for Older Adults.\"  Current as of: July 31, 2024  Content Version: 14.4  © 2045-4072 [employer]   Care instructions adapted under license by [employer] If you have questions about a medical condition or this

## 2025-05-14 ENCOUNTER — RESULTS FOLLOW-UP (OUTPATIENT)
Dept: INTERNAL MEDICINE CLINIC | Age: 85
End: 2025-05-14

## 2025-05-14 LAB
ALBUMIN SERPL-MCNC: 4.4 G/DL (ref 3.4–5)
ALBUMIN/GLOB SERPL: 1.6 {RATIO} (ref 1.1–2.2)
ALP SERPL-CCNC: 92 U/L (ref 40–129)
ALT SERPL-CCNC: 15 U/L (ref 10–40)
ANION GAP SERPL CALCULATED.3IONS-SCNC: 12 MMOL/L (ref 3–16)
AST SERPL-CCNC: 27 U/L (ref 15–37)
BASOPHILS # BLD: 0.1 K/UL (ref 0–0.2)
BASOPHILS NFR BLD: 0.6 %
BILIRUB SERPL-MCNC: 0.7 MG/DL (ref 0–1)
BUN SERPL-MCNC: 22 MG/DL (ref 7–20)
CALCIUM SERPL-MCNC: 10 MG/DL (ref 8.3–10.6)
CHLORIDE SERPL-SCNC: 103 MMOL/L (ref 99–110)
CHOLEST SERPL-MCNC: 159 MG/DL (ref 0–199)
CO2 SERPL-SCNC: 25 MMOL/L (ref 21–32)
CREAT SERPL-MCNC: 1.1 MG/DL (ref 0.8–1.3)
CREAT UR-MCNC: 164 MG/DL (ref 39–259)
DEPRECATED RDW RBC AUTO: 14.3 % (ref 12.4–15.4)
EOSINOPHIL # BLD: 0.2 K/UL (ref 0–0.6)
EOSINOPHIL NFR BLD: 1.7 %
GFR SERPLBLD CREATININE-BSD FMLA CKD-EPI: 66 ML/MIN/{1.73_M2}
GLUCOSE SERPL-MCNC: 113 MG/DL (ref 70–99)
HCT VFR BLD AUTO: 42.1 % (ref 40.5–52.5)
HDLC SERPL-MCNC: 59 MG/DL (ref 40–60)
HGB BLD-MCNC: 14.2 G/DL (ref 13.5–17.5)
LDL CHOLESTEROL: 69 MG/DL
LYMPHOCYTES # BLD: 2.1 K/UL (ref 1–5.1)
LYMPHOCYTES NFR BLD: 18.5 %
MCH RBC QN AUTO: 32.7 PG (ref 26–34)
MCHC RBC AUTO-ENTMCNC: 33.7 G/DL (ref 31–36)
MCV RBC AUTO: 97.1 FL (ref 80–100)
MICROALBUMIN UR DL<=1MG/L-MCNC: 3.6 MG/DL
MICROALBUMIN/CREAT UR: 22 MG/G (ref 0–30)
MONOCYTES # BLD: 0.7 K/UL (ref 0–1.3)
MONOCYTES NFR BLD: 6.5 %
NEUTROPHILS # BLD: 8.1 K/UL (ref 1.7–7.7)
NEUTROPHILS NFR BLD: 72.7 %
PLATELET # BLD AUTO: 323 K/UL (ref 135–450)
PMV BLD AUTO: 9.5 FL (ref 5–10.5)
POTASSIUM SERPL-SCNC: 6.2 MMOL/L (ref 3.5–5.1)
PROT SERPL-MCNC: 7.2 G/DL (ref 6.4–8.2)
PSA SERPL DL<=0.01 NG/ML-MCNC: 0.06 NG/ML (ref 0–4)
RBC # BLD AUTO: 4.33 M/UL (ref 4.2–5.9)
SODIUM SERPL-SCNC: 140 MMOL/L (ref 136–145)
TRIGL SERPL-MCNC: 153 MG/DL (ref 0–150)
TSH SERPL DL<=0.005 MIU/L-ACNC: 2.47 UIU/ML (ref 0.27–4.2)
VLDLC SERPL CALC-MCNC: 31 MG/DL
WBC # BLD AUTO: 11.1 K/UL (ref 4–11)

## 2025-05-28 ENCOUNTER — HOSPITAL ENCOUNTER (OUTPATIENT)
Age: 85
Discharge: HOME | End: 2025-05-28
Payer: MEDICARE

## 2025-05-28 DIAGNOSIS — R53.83 FATIGUE, UNSPECIFIED TYPE: ICD-10-CM

## 2025-05-28 DIAGNOSIS — M89.9 CHRONIC KIDNEY DISEASE-MINERAL AND BONE DISORDER: ICD-10-CM

## 2025-05-28 DIAGNOSIS — N18.30 STAGE 3 CHRONIC KIDNEY DISEASE, UNSPECIFIED WHETHER STAGE 3A OR 3B CKD (HCC): ICD-10-CM

## 2025-05-28 DIAGNOSIS — E87.5 HYPERKALEMIA: ICD-10-CM

## 2025-05-28 DIAGNOSIS — N18.9 CHRONIC KIDNEY DISEASE-MINERAL AND BONE DISORDER: ICD-10-CM

## 2025-05-28 DIAGNOSIS — E83.9 CHRONIC KIDNEY DISEASE-MINERAL AND BONE DISORDER: ICD-10-CM

## 2025-05-28 DIAGNOSIS — E11.21 DIABETIC NEPHROPATHY ASSOCIATED WITH TYPE 2 DIABETES MELLITUS (HCC): ICD-10-CM

## 2025-05-28 LAB
ANION GAP SERPL CALCULATED.3IONS-SCNC: 11 MMOL/L (ref 9–17)
BUN SERPL-MCNC: 22 MG/DL (ref 7–20)
CALCIUM SERPL-MCNC: 9.5 MG/DL (ref 8.3–10.6)
CHLORIDE SERPL-SCNC: 101 MMOL/L (ref 99–110)
CO2 SERPL-SCNC: 26 MMOL/L (ref 21–32)
CREAT SERPL-MCNC: 1.1 MG/DL (ref 0.8–1.3)
GFR, ESTIMATED: 67 ML/MIN/1.73M2
GLUCOSE SERPL-MCNC: 100 MG/DL (ref 74–99)
POTASSIUM SERPL-SCNC: 4.5 MMOL/L (ref 3.5–5.1)
SODIUM SERPL-SCNC: 139 MMOL/L (ref 136–145)

## 2025-05-28 PROCEDURE — 80048 BASIC METABOLIC PNL TOTAL CA: CPT

## 2025-05-28 RX ORDER — GLUCOSA SU 2KCL/CHONDROITIN SU 500-400 MG
1 CAPSULE ORAL DAILY
Qty: 90 TABLET | Refills: 1 | Status: SHIPPED | OUTPATIENT
Start: 2025-05-28

## 2025-07-02 ENCOUNTER — RESULTS FOLLOW-UP (OUTPATIENT)
Dept: NEPHROLOGY | Age: 85
End: 2025-07-02

## 2025-07-02 ENCOUNTER — HOSPITAL ENCOUNTER (OUTPATIENT)
Age: 85
Discharge: HOME | End: 2025-07-02
Payer: MEDICARE

## 2025-07-02 DIAGNOSIS — E87.5 HYPERKALEMIA: ICD-10-CM

## 2025-07-02 DIAGNOSIS — N18.30 STAGE 3 CHRONIC KIDNEY DISEASE, UNSPECIFIED WHETHER STAGE 3A OR 3B CKD (HCC): ICD-10-CM

## 2025-07-02 LAB
ANION GAP SERPL CALCULATED.3IONS-SCNC: 10 MMOL/L (ref 9–17)
BUN SERPL-MCNC: 16 MG/DL (ref 7–20)
CALCIUM SERPL-MCNC: 9.4 MG/DL (ref 8.3–10.6)
CHLORIDE SERPL-SCNC: 102 MMOL/L (ref 99–110)
CO2 SERPL-SCNC: 27 MMOL/L (ref 21–32)
CREAT SERPL-MCNC: 1 MG/DL (ref 0.8–1.3)
GFR, ESTIMATED: 68 ML/MIN/1.73M2
GLUCOSE SERPL-MCNC: 106 MG/DL (ref 74–99)
POTASSIUM SERPL-SCNC: 4.3 MMOL/L (ref 3.5–5.1)
SODIUM SERPL-SCNC: 139 MMOL/L (ref 136–145)

## 2025-07-02 PROCEDURE — 80048 BASIC METABOLIC PNL TOTAL CA: CPT

## 2025-07-17 NOTE — PROGRESS NOTES
Patient Name: Desean Lyles  : 1940  MRN# [id number]      Insurance: Payor: [employer] /  /  /     Phone #: [phone]     REASON FOR VISIT: 6 month follow  Patient Active Problem List    Diagnosis Date Noted    PAF (paroxysmal atrial fibrillation) (HCC) 2024    Hyperkalemia 2024    Abnormal EKG 2023    Family history of coronary artery disease 2023    Stage 3 chronic kidney disease, unspecified whether stage 3a or 3b CKD (HCC) 08/10/2023    Hypothyroidism due to acquired atrophy of thyroid 08/10/2023    Tobacco abuse 2021    Fatigue 2021    History of COVID-19 2021    Vitamin D deficiency 2021    Coronary artery disease involving native coronary artery of native heart with angina pectoris 2021    Precordial pain 2021    Vertigo 2021    Allergic rhinitis 2021    Need for Tdap vaccination 2021    Insomnia 2020    Shingles 10/25/2017    History of 24 hour EKG monitoring 2014    Anxiety     Shortness of breath 2014    Type 2 diabetes mellitus (HCC) 2011    Hyperlipidemia 2011    Diverticulosis 2011    Hx of adenomatous colonic polyps 2011    Family history of prostate cancer 2011     LABS:            Component 25 1341 24 1343 23 1528 22 1436 22 1449 21 1347 21 1035   Cholesterol, Fasting 159 133 144 155 144 143 158   Triglyceride, Fasting 153 High  129 174 High  160 High  110 154 High  151 High    HDL 59 55 52 54 57 51 49   LDL Cholesterol 69 52 57 69 65 61 79   VLDL Cholesterol Calculated 31 26 35 32 22 31 30                  Component 25 1341 24 1343 23 1528 21 1347 3/26/19 1327 10/3/18 1140 3/21/18 1046   TSH 2.47 1.51 2.18 4.62 High  3.36 4.46 High  4.45 High         Hemoglobin A1C   Date Value Ref Range Status   2025 5.9 % Final                 Pt updated on plan of care and wait times.  Pt reports no needs at this time, call light within reach.

## 2025-07-29 ENCOUNTER — OFFICE VISIT (OUTPATIENT)
Dept: CARDIOLOGY CLINIC | Age: 85
End: 2025-07-29
Payer: MEDICARE

## 2025-07-29 ENCOUNTER — HOSPITAL ENCOUNTER (OUTPATIENT)
Age: 85
Discharge: HOME | End: 2025-07-29
Payer: MEDICARE

## 2025-07-29 VITALS
WEIGHT: 195 LBS | BODY MASS INDEX: 27.92 KG/M2 | DIASTOLIC BLOOD PRESSURE: 74 MMHG | HEART RATE: 72 BPM | HEIGHT: 70 IN | SYSTOLIC BLOOD PRESSURE: 118 MMHG

## 2025-07-29 DIAGNOSIS — E78.2 MIXED HYPERLIPIDEMIA: ICD-10-CM

## 2025-07-29 DIAGNOSIS — Z86.16 HISTORY OF COVID-19: ICD-10-CM

## 2025-07-29 DIAGNOSIS — Z72.0 TOBACCO ABUSE: ICD-10-CM

## 2025-07-29 DIAGNOSIS — R94.31 ABNORMAL EKG: ICD-10-CM

## 2025-07-29 DIAGNOSIS — E87.5 HYPERKALEMIA: ICD-10-CM

## 2025-07-29 DIAGNOSIS — E11.65 TYPE 2 DIABETES MELLITUS WITH HYPERGLYCEMIA, WITHOUT LONG-TERM CURRENT USE OF INSULIN (HCC): ICD-10-CM

## 2025-07-29 DIAGNOSIS — I25.119 CORONARY ARTERY DISEASE INVOLVING NATIVE CORONARY ARTERY OF NATIVE HEART WITH ANGINA PECTORIS: Primary | ICD-10-CM

## 2025-07-29 DIAGNOSIS — I48.0 PAF (PAROXYSMAL ATRIAL FIBRILLATION) (HCC): ICD-10-CM

## 2025-07-29 DIAGNOSIS — N18.30 STAGE 3 CHRONIC KIDNEY DISEASE, UNSPECIFIED WHETHER STAGE 3A OR 3B CKD (HCC): ICD-10-CM

## 2025-07-29 LAB
ANION GAP SERPL CALCULATED.3IONS-SCNC: 14 MMOL/L (ref 9–17)
BUN SERPL-MCNC: 14 MG/DL (ref 7–20)
CALCIUM SERPL-MCNC: 9.9 MG/DL (ref 8.3–10.6)
CHLORIDE SERPL-SCNC: 101 MMOL/L (ref 99–110)
CO2 SERPL-SCNC: 27 MMOL/L (ref 21–32)
CREAT SERPL-MCNC: 1.1 MG/DL (ref 0.8–1.3)
GFR, ESTIMATED: 67 ML/MIN/1.73M2
GLUCOSE SERPL-MCNC: 79 MG/DL (ref 74–99)
POTASSIUM SERPL-SCNC: 4.3 MMOL/L (ref 3.5–5.1)
SODIUM SERPL-SCNC: 142 MMOL/L (ref 136–145)

## 2025-07-29 PROCEDURE — 3044F HG A1C LEVEL LT 7.0%: CPT | Performed by: INTERNAL MEDICINE

## 2025-07-29 PROCEDURE — G8417 CALC BMI ABV UP PARAM F/U: HCPCS | Performed by: INTERNAL MEDICINE

## 2025-07-29 PROCEDURE — 1123F ACP DISCUSS/DSCN MKR DOCD: CPT | Performed by: INTERNAL MEDICINE

## 2025-07-29 PROCEDURE — 80048 BASIC METABOLIC PNL TOTAL CA: CPT

## 2025-07-29 PROCEDURE — 1160F RVW MEDS BY RX/DR IN RCRD: CPT | Performed by: INTERNAL MEDICINE

## 2025-07-29 PROCEDURE — 99214 OFFICE O/P EST MOD 30 MIN: CPT | Performed by: INTERNAL MEDICINE

## 2025-07-29 PROCEDURE — 4004F PT TOBACCO SCREEN RCVD TLK: CPT | Performed by: INTERNAL MEDICINE

## 2025-07-29 PROCEDURE — 1159F MED LIST DOCD IN RCRD: CPT | Performed by: INTERNAL MEDICINE

## 2025-07-29 PROCEDURE — G8427 DOCREV CUR MEDS BY ELIG CLIN: HCPCS | Performed by: INTERNAL MEDICINE

## 2025-07-29 NOTE — PATIENT INSTRUCTIONS
CORONARY ARTERY DISEASE:Yes  clinically stable. Patient is on optimal medical regimen ( see medication list above )  Patient is currently  asymptomatic from CAD.   -changes in  treatment:  no,on ASA & iMDUR   -Testing ordered:  no  Cameron classification: 3  CATH 7/28/2021   Most significant stenosis is in RCA, it is 50 to 60 % proximal   stenosis.   Normal LV systolic function. LVEF is 50 to 55 %.     12/26/2023  This is a normal Cardiolite perfusion imaging test.  Patient's baseline EKG reveals atrial flutter with nonspecific ST-T abnormalities only.  Cardiolite perfusion imaging performed reveals a normal Cardiolite uptake in all all myocardial territories without any findings suggestive of myocardial infarction or reversible ischemia.  Inferior wall abnormality seen is consistent with diaphragmatic attenuation artifact.     There is mild global hypokinesis noted with mildly reduced LV function ejection fraction is 44%.     HTN:No History  VHD: NONE SIGNIFICANT.  ECHO 12/26/2023    Left Ventricle: Normal left ventricular systolic function with a visually estimated EF of 55 - 60%. Left ventricle size is normal. Mildly increased wall thickness. Normal wall motion.    Aortic Valve: Sclerotic but non stenotic.    Mitral Valve: Mild annular calcification of the mitral valve. Mild regurgitation.    Tricuspid Valve: Mild regurgitation. The estimated RVSP is 32 mmHg.    Pulmonic Valve: Mild regurgitation.    Left Atrium: Left atrium is mildly dilated.    Pericardium: No pericardial effusion.    Image quality is good.     DYSLIPIDEMIA: yes,   Patient's profile is at / near Goal.yes,   Tolerating current medical regimen wellyes, Takes Lipitor  See most recent Lab values in Labs section above.  Diabetes mellitis:yes,   BS under good control yes,   Hgb A1c avilable yes,      ATRIAL FIBRILLATION: New diagnosis.  EKG: A-Fib 88 / min, LAFB  Atrial Fibrillation CHADSVASC2 Score Stroke Risk:   83 y.o. > 75 - 2    male Male - 0

## 2025-07-29 NOTE — PROGRESS NOTES
OFFICE PROGRESS NOTES      Desean is a 84 y.o. male who has    CHIEF COMPLAINT AS FOLLOWS:  CHEST PAIN: Patient denies any C/O chest pains at this time.      SOB:  C/O SOB with exertion as before.              .   LEG EDEMA: No leg edema   PALPITATIONS: Denies any C/O Palpitations   DIZZINESS: No C/O Dizziness   SYNCOPE: None   OTHER/ ADDITIONAL COMPLAINTS:                                     HPI: Patient is here for F/U on his CAD, Arrhythmia & Dyslipidemia problems.   CAD: Patient has known CAD.   Arrhythmia: Patient has known history of atrial fibrillation.  Dyslipidemia: Patient has known mixed dyslipidemia. Has been treated with guideline recommended medical / physical/ diet therapy as stated below.                Current Outpatient Medications   Medication Sig Dispense Refill    apixaban (ELIQUIS) 5 MG TABS tablet Take 1 tablet by mouth 2 times daily 60 tablet 5    Multiple Vitamins-Minerals (THERAPEUTIC-M) TABS TAKE 1 TABLET BY MOUTH EVERY DAY 90 tablet 1    sodium zirconium cyclosilicate (LOKELMA) 10 g PACK oral suspension Take 1 packet by mouth daily 30 packet 11    triamcinolone (KENALOG) 0.1 % cream Apply topically 2 times daily Apply topically 2 times daily. 80 g 0    clotrimazole-betamethasone (LOTRISONE) 1-0.05 % cream Apply topically 2 times daily. 45 g 1    montelukast (SINGULAIR) 10 MG tablet TAKE 1 TABLET BY MOUTH EVERY DAY 90 tablet 1    metFORMIN (GLUCOPHAGE-XR) 500 MG extended release tablet TAKE 1 TABLET BY MOUTH EVERY DAY WITH BREAKFAST 90 tablet 1    levothyroxine (SYNTHROID) 75 MCG tablet TAKE 1 TABLET BY MOUTH EVERY DAY 90 tablet 1    atorvastatin (LIPITOR) 40 MG tablet Take 1 tablet by mouth daily 90 tablet 3    isosorbide mononitrate (IMDUR) 30 MG extended release tablet Take 1 tablet by mouth daily 90 tablet 3    albuterol sulfate HFA (VENTOLIN HFA) 108 (90 Base) MCG/ACT inhaler Inhale 2 puffs into the lungs 4 times daily as needed for Wheezing or Shortness of Breath 1 each 3

## 2025-08-23 DIAGNOSIS — E03.4 HYPOTHYROIDISM DUE TO ACQUIRED ATROPHY OF THYROID: ICD-10-CM

## 2025-08-23 DIAGNOSIS — J30.9 ALLERGIC RHINITIS, UNSPECIFIED SEASONALITY, UNSPECIFIED TRIGGER: ICD-10-CM

## 2025-08-23 DIAGNOSIS — E11.65 TYPE 2 DIABETES MELLITUS WITH HYPERGLYCEMIA, WITHOUT LONG-TERM CURRENT USE OF INSULIN (HCC): ICD-10-CM

## 2025-08-25 RX ORDER — MONTELUKAST SODIUM 10 MG/1
10 TABLET ORAL DAILY
Qty: 90 TABLET | Refills: 1 | Status: SHIPPED | OUTPATIENT
Start: 2025-08-25

## 2025-08-25 RX ORDER — LEVOTHYROXINE SODIUM 75 UG/1
75 TABLET ORAL DAILY
Qty: 90 TABLET | Refills: 1 | Status: SHIPPED | OUTPATIENT
Start: 2025-08-25

## 2025-08-25 RX ORDER — METFORMIN HYDROCHLORIDE 500 MG/1
500 TABLET, EXTENDED RELEASE ORAL
Qty: 90 TABLET | Refills: 1 | Status: SHIPPED | OUTPATIENT
Start: 2025-08-25

## 2025-08-29 ENCOUNTER — HOSPITAL ENCOUNTER (OUTPATIENT)
Dept: LAB | Age: 85
Discharge: HOME | End: 2025-08-29
Payer: MEDICARE

## 2025-08-29 DIAGNOSIS — N18.30 STAGE 3 CHRONIC KIDNEY DISEASE, UNSPECIFIED WHETHER STAGE 3A OR 3B CKD (HCC): ICD-10-CM

## 2025-08-29 DIAGNOSIS — E87.5 HYPERKALEMIA: ICD-10-CM

## 2025-08-29 LAB
ANION GAP SERPL CALCULATED.3IONS-SCNC: 11 MMOL/L (ref 9–17)
BUN SERPL-MCNC: 14 MG/DL (ref 7–20)
CALCIUM SERPL-MCNC: 9.3 MG/DL (ref 8.3–10.6)
CHLORIDE SERPL-SCNC: 104 MMOL/L (ref 99–110)
CO2 SERPL-SCNC: 28 MMOL/L (ref 21–32)
CREAT SERPL-MCNC: 1 MG/DL (ref 0.8–1.3)
GFR, ESTIMATED: 73 ML/MIN/1.73M2
GLUCOSE SERPL-MCNC: 93 MG/DL (ref 74–99)
POTASSIUM SERPL-SCNC: 4.2 MMOL/L (ref 3.5–5.1)
SODIUM SERPL-SCNC: 143 MMOL/L (ref 136–145)

## 2025-08-29 PROCEDURE — 80048 BASIC METABOLIC PNL TOTAL CA: CPT
